# Patient Record
Sex: FEMALE | Race: WHITE | NOT HISPANIC OR LATINO | Employment: FULL TIME | URBAN - METROPOLITAN AREA
[De-identification: names, ages, dates, MRNs, and addresses within clinical notes are randomized per-mention and may not be internally consistent; named-entity substitution may affect disease eponyms.]

---

## 2023-10-09 PROBLEM — S09.92XA NASAL TRAUMA, INITIAL ENCOUNTER: Status: ACTIVE | Noted: 2023-10-09

## 2023-10-09 PROBLEM — S79.919A HIP INJURY: Status: ACTIVE | Noted: 2023-10-09

## 2023-10-09 PROBLEM — S99.929A INJURY OF FOOT: Status: ACTIVE | Noted: 2023-10-09

## 2023-10-09 PROBLEM — J30.9 ALLERGIC RHINITIS: Status: ACTIVE | Noted: 2023-10-09

## 2023-10-09 PROBLEM — F41.8 DEPRESSION WITH ANXIETY: Status: ACTIVE | Noted: 2023-10-09

## 2023-10-09 PROBLEM — S61.219A LACERATION OF FINGER: Status: ACTIVE | Noted: 2023-10-09

## 2023-10-09 PROBLEM — S83.90XA SPRAIN OF KNEE: Status: ACTIVE | Noted: 2023-10-09

## 2023-10-09 PROBLEM — E78.5 HYPERLIPIDEMIA: Status: ACTIVE | Noted: 2023-10-09
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
## 2023-10-09 PROBLEM — M48.061 SPINAL STENOSIS OF LUMBAR REGION AT MULTIPLE LEVELS: Status: ACTIVE | Noted: 2023-10-09

## 2023-10-09 PROBLEM — R05.9 COUGH: Status: ACTIVE | Noted: 2023-10-09

## 2023-10-09 PROBLEM — M79.676 PAIN IN TOE: Status: ACTIVE | Noted: 2023-10-09

## 2023-10-09 PROBLEM — M25.519 SHOULDER PAIN: Status: ACTIVE | Noted: 2023-10-09

## 2023-10-09 PROBLEM — M54.50 CHRONIC LOW BACK PAIN: Status: ACTIVE | Noted: 2023-10-09

## 2023-10-09 PROBLEM — G43.909 MIGRAINE: Status: ACTIVE | Noted: 2023-10-09

## 2023-10-09 PROBLEM — N95.2 ATROPHIC VAGINITIS: Status: ACTIVE | Noted: 2023-10-09

## 2023-10-09 PROBLEM — I10 HTN (HYPERTENSION): Status: ACTIVE | Noted: 2023-10-09

## 2023-10-09 PROBLEM — I96 SKIN NECROSIS (MULTI): Status: ACTIVE | Noted: 2023-10-09

## 2023-10-09 PROBLEM — M79.603 PAIN IN UPPER LIMB: Status: ACTIVE | Noted: 2023-10-09

## 2023-10-09 PROBLEM — J20.9 ACUTE BRONCHITIS: Status: ACTIVE | Noted: 2023-10-09

## 2023-10-09 PROBLEM — E28.39 PREMATURE OVARIAN FAILURE: Status: ACTIVE | Noted: 2023-10-09

## 2023-10-09 PROBLEM — Z76.5 DRUG-SEEKING BEHAVIOR: Status: ACTIVE | Noted: 2023-10-09

## 2023-10-09 PROBLEM — M51.36 DEGENERATION OF LUMBAR INTERVERTEBRAL DISC: Status: ACTIVE | Noted: 2023-10-09

## 2023-10-09 PROBLEM — R91.1 LUNG NODULE: Status: ACTIVE | Noted: 2023-10-09

## 2023-10-09 PROBLEM — J02.9 SORE THROAT: Status: ACTIVE | Noted: 2023-10-09

## 2023-10-09 PROBLEM — G89.29 CHRONIC LOW BACK PAIN: Status: ACTIVE | Noted: 2023-10-09

## 2023-10-09 PROBLEM — G47.00 INSOMNIA: Status: ACTIVE | Noted: 2023-10-09

## 2023-10-09 PROBLEM — F17.200 TOBACCO USE DISORDER: Status: ACTIVE | Noted: 2023-10-09

## 2023-10-09 PROBLEM — Z78.9 MEDICAL HOME PATIENT: Status: ACTIVE | Noted: 2023-10-09

## 2023-10-09 PROBLEM — K08.89 TOOTHACHE: Status: ACTIVE | Noted: 2023-10-09

## 2023-10-09 PROBLEM — R13.10 DYSPHAGIA: Status: ACTIVE | Noted: 2023-10-09

## 2023-10-09 PROBLEM — S39.012A BACK STRAIN: Status: ACTIVE | Noted: 2023-10-09

## 2023-10-09 PROBLEM — M79.606 PAIN OF LOWER EXTREMITY: Status: ACTIVE | Noted: 2023-10-09

## 2023-10-09 PROBLEM — R87.612 LGSIL OF CERVIX OF UNDETERMINED SIGNIFICANCE: Status: ACTIVE | Noted: 2023-10-09

## 2023-10-09 PROBLEM — M67.919 DISORDER OF ROTATOR CUFF: Status: ACTIVE | Noted: 2023-10-09

## 2023-10-09 PROBLEM — E55.9 VITAMIN D DEFICIENCY: Status: ACTIVE | Noted: 2023-10-09

## 2023-10-09 PROBLEM — M51.369 DEGENERATION OF LUMBAR INTERVERTEBRAL DISC: Status: ACTIVE | Noted: 2023-10-09

## 2023-10-09 RX ORDER — GABAPENTIN 600 MG/1
600 TABLET ORAL 3 TIMES DAILY
COMMUNITY
Start: 2020-12-02 | End: 2023-11-14 | Stop reason: SDUPTHER

## 2023-10-09 RX ORDER — ALBUTEROL SULFATE 90 UG/1
2 AEROSOL, METERED RESPIRATORY (INHALATION) EVERY 4 HOURS PRN
COMMUNITY
Start: 2021-06-09 | End: 2023-10-27 | Stop reason: ALTCHOICE

## 2023-10-09 RX ORDER — ONDANSETRON 4 MG/1
4 TABLET, ORALLY DISINTEGRATING ORAL DAILY
COMMUNITY
Start: 2023-06-19 | End: 2023-10-27 | Stop reason: ALTCHOICE

## 2023-10-09 RX ORDER — IBUPROFEN 800 MG/1
800 TABLET ORAL EVERY 8 HOURS PRN
COMMUNITY
End: 2023-10-27 | Stop reason: ALTCHOICE

## 2023-10-09 RX ORDER — TIZANIDINE 4 MG/1
4 TABLET ORAL 3 TIMES DAILY PRN
COMMUNITY
Start: 2023-10-07 | End: 2023-10-27 | Stop reason: ALTCHOICE

## 2023-10-09 RX ORDER — TRAZODONE HYDROCHLORIDE 100 MG/1
200 TABLET ORAL NIGHTLY
COMMUNITY
End: 2023-10-27 | Stop reason: SDUPTHER

## 2023-10-09 RX ORDER — LIDOCAINE 560 MG/1
1 PATCH PERCUTANEOUS; TOPICAL; TRANSDERMAL 2 TIMES DAILY
COMMUNITY
End: 2023-10-27 | Stop reason: ALTCHOICE

## 2023-10-09 RX ORDER — NAPROXEN 500 MG/1
500 TABLET ORAL
COMMUNITY
End: 2023-10-27 | Stop reason: ALTCHOICE

## 2023-10-09 RX ORDER — CHOLECALCIFEROL (VITAMIN D3) 50 MCG
1 TABLET ORAL DAILY
COMMUNITY
End: 2023-12-01 | Stop reason: SDUPTHER

## 2023-10-09 RX ORDER — TRAZODONE HYDROCHLORIDE 100 MG/1
200 TABLET ORAL NIGHTLY
Qty: 60 TABLET | Refills: 3 | OUTPATIENT
Start: 2023-10-09 | End: 2023-11-08

## 2023-10-09 RX ORDER — DULOXETIN HYDROCHLORIDE 30 MG/1
60 CAPSULE, DELAYED RELEASE ORAL DAILY
COMMUNITY
Start: 2021-05-17 | End: 2023-10-27 | Stop reason: ALTCHOICE

## 2023-10-09 RX ORDER — FLUTICASONE PROPIONATE AND SALMETEROL 250; 50 UG/1; UG/1
1 POWDER RESPIRATORY (INHALATION)
COMMUNITY
Start: 2021-11-05 | End: 2023-10-27 | Stop reason: ALTCHOICE

## 2023-10-09 RX ORDER — OXYCODONE HYDROCHLORIDE AND ACETAMINOPHEN 10; 325 MG/1; MG/1
1 TABLET ORAL EVERY 6 HOURS PRN
COMMUNITY
End: 2023-10-27 | Stop reason: ALTCHOICE

## 2023-10-09 RX ORDER — CHLORZOXAZONE 500 MG/1
500 TABLET ORAL 4 TIMES DAILY PRN
COMMUNITY
End: 2023-10-27 | Stop reason: SDUPTHER

## 2023-10-09 RX ORDER — TRAMADOL HYDROCHLORIDE 50 MG/1
100 TABLET ORAL EVERY 6 HOURS PRN
COMMUNITY
End: 2023-10-27 | Stop reason: SDUPTHER

## 2023-10-09 RX ORDER — SUMATRIPTAN SUCCINATE 100 MG/1
100 TABLET ORAL
COMMUNITY
End: 2024-01-11 | Stop reason: SDUPTHER

## 2023-10-09 RX ORDER — METHOCARBAMOL 750 MG/1
750 TABLET, FILM COATED ORAL 3 TIMES DAILY
COMMUNITY
End: 2023-10-27 | Stop reason: ALTCHOICE

## 2023-10-09 RX ORDER — PANTOPRAZOLE SODIUM 40 MG/1
40 TABLET, DELAYED RELEASE ORAL DAILY
COMMUNITY

## 2023-10-13 ENCOUNTER — TELEPHONE (OUTPATIENT)
Dept: PRIMARY CARE | Facility: CLINIC | Age: 48
End: 2023-10-13
Payer: COMMERCIAL

## 2023-10-13 DIAGNOSIS — M51.36 DEGENERATION OF LUMBAR INTERVERTEBRAL DISC: Primary | ICD-10-CM

## 2023-10-26 PROBLEM — M96.1 POST LAMINECTOMY SYNDROME: Status: ACTIVE | Noted: 2023-10-26

## 2023-10-27 ENCOUNTER — OFFICE VISIT (OUTPATIENT)
Dept: PRIMARY CARE | Facility: CLINIC | Age: 48
End: 2023-10-27
Payer: COMMERCIAL

## 2023-10-27 VITALS
SYSTOLIC BLOOD PRESSURE: 110 MMHG | OXYGEN SATURATION: 95 % | BODY MASS INDEX: 22.18 KG/M2 | WEIGHT: 117.4 LBS | HEART RATE: 80 BPM | DIASTOLIC BLOOD PRESSURE: 68 MMHG

## 2023-10-27 DIAGNOSIS — R55 VASOVAGAL SYNCOPE: ICD-10-CM

## 2023-10-27 DIAGNOSIS — F51.01 PRIMARY INSOMNIA: ICD-10-CM

## 2023-10-27 DIAGNOSIS — K59.03 DRUG-INDUCED CONSTIPATION: Primary | ICD-10-CM

## 2023-10-27 DIAGNOSIS — K64.9 ACUTE HEMORRHOID: ICD-10-CM

## 2023-10-27 DIAGNOSIS — M51.36 DEGENERATION OF LUMBAR INTERVERTEBRAL DISC: ICD-10-CM

## 2023-10-27 PROCEDURE — 3078F DIAST BP <80 MM HG: CPT | Performed by: INTERNAL MEDICINE

## 2023-10-27 PROCEDURE — 4004F PT TOBACCO SCREEN RCVD TLK: CPT | Performed by: INTERNAL MEDICINE

## 2023-10-27 PROCEDURE — 99214 OFFICE O/P EST MOD 30 MIN: CPT | Performed by: INTERNAL MEDICINE

## 2023-10-27 PROCEDURE — 3074F SYST BP LT 130 MM HG: CPT | Performed by: INTERNAL MEDICINE

## 2023-10-27 PROCEDURE — 90686 IIV4 VACC NO PRSV 0.5 ML IM: CPT | Performed by: INTERNAL MEDICINE

## 2023-10-27 RX ORDER — CHLORZOXAZONE 500 MG/1
500 TABLET ORAL 4 TIMES DAILY PRN
Qty: 120 TABLET | Refills: 11 | Status: SHIPPED | OUTPATIENT
Start: 2023-10-27

## 2023-10-27 RX ORDER — TRAZODONE HYDROCHLORIDE 100 MG/1
200 TABLET ORAL NIGHTLY
Qty: 60 TABLET | Refills: 11 | Status: SHIPPED | OUTPATIENT
Start: 2023-10-27

## 2023-10-27 RX ORDER — TRAMADOL HYDROCHLORIDE 50 MG/1
100 TABLET ORAL EVERY 6 HOURS PRN
Qty: 120 TABLET | Refills: 0 | Status: SHIPPED | OUTPATIENT
Start: 2023-10-27 | End: 2023-11-14 | Stop reason: SDUPTHER

## 2023-10-27 ASSESSMENT — ENCOUNTER SYMPTOMS
OCCASIONAL FEELINGS OF UNSTEADINESS: 0
LOSS OF SENSATION IN FEET: 0
DEPRESSION: 0

## 2023-10-27 ASSESSMENT — PATIENT HEALTH QUESTIONNAIRE - PHQ9
2. FEELING DOWN, DEPRESSED OR HOPELESS: NOT AT ALL
SUM OF ALL RESPONSES TO PHQ9 QUESTIONS 1 AND 2: 0
1. LITTLE INTEREST OR PLEASURE IN DOING THINGS: NOT AT ALL

## 2023-10-27 ASSESSMENT — PAIN SCALES - GENERAL: PAINLEVEL: 4

## 2023-10-27 NOTE — PROGRESS NOTES
Subjective   Patient ID: Suly Vance is a 48 y.o. female who presents for No chief complaint on file..    In ER for syncope. Found rectal impaction from the CT. ECG and labs wnl.  Used hemorroid wipes w/ lidocaine than used enemas and has been going since then. Couldn't do disimpaction in ER do to hemorrhoid pain.      Intermittent nausea--couldn't take zofran caused severe migraine like headaces           Objective   There were no vitals taken for this visit.    Physical Exam  Constitutional:       General: She is not in acute distress.  Abdominal:      General: Abdomen is flat. Bowel sounds are normal.      Palpations: Abdomen is soft. There is no mass.      Tenderness: There is no abdominal tenderness.         Assessment/Plan   will 2x/day miralax and adjust to soft BM       Suly was seen today for follow-up.  Diagnoses and all orders for this visit:  Drug-induced constipation (Primary)  -     Referral to Gastroenterology; Future  Acute hemorrhoid  -     Referral to Gastroenterology; Future  Vasovagal syncope  Degeneration of lumbar intervertebral disc  -     chlorzoxazone (Parafon Forte) 500 mg tablet; Take 1 tablet (500 mg) by mouth 4 times a day as needed for muscle spasms.  -     traMADol (Ultram) 50 mg tablet; Take 2 tablets (100 mg) by mouth every 6 hours if needed for severe pain (7 - 10).  Primary insomnia  -     traZODone (Desyrel) 100 mg tablet; Take 2 tablets (200 mg) by mouth once daily at bedtime.  Other orders  -     Flu vaccine (IIV4) age 6 months and greater, preservative free  -     Follow Up In Primary Care - Established; Future

## 2023-11-01 ENCOUNTER — APPOINTMENT (OUTPATIENT)
Dept: PRIMARY CARE | Facility: CLINIC | Age: 48
End: 2023-11-01
Payer: COMMERCIAL

## 2023-11-14 DIAGNOSIS — M51.36 DEGENERATION OF LUMBAR INTERVERTEBRAL DISC: ICD-10-CM

## 2023-11-14 RX ORDER — GABAPENTIN 600 MG/1
600 TABLET ORAL 3 TIMES DAILY
Qty: 270 TABLET | Refills: 3 | Status: SHIPPED | OUTPATIENT
Start: 2023-11-14

## 2023-11-14 RX ORDER — TRAMADOL HYDROCHLORIDE 50 MG/1
100 TABLET ORAL EVERY 6 HOURS PRN
Qty: 240 TABLET | Refills: 2 | Status: SHIPPED | OUTPATIENT
Start: 2023-11-14 | End: 2023-12-14

## 2023-12-01 DIAGNOSIS — E55.9 VITAMIN D DEFICIENCY: ICD-10-CM

## 2023-12-01 RX ORDER — OMEGA-3S/DHA/EPA/FISH OIL/D3 300MG-1000
2000 CAPSULE ORAL DAILY
Qty: 90 TABLET | Refills: 3 | Status: SHIPPED | OUTPATIENT
Start: 2023-12-01 | End: 2024-06-03 | Stop reason: SDUPTHER

## 2024-01-11 DIAGNOSIS — G43.C0 PERIODIC HEADACHE SYNDROME, NOT INTRACTABLE: ICD-10-CM

## 2024-01-11 RX ORDER — SUMATRIPTAN SUCCINATE 100 MG/1
100 TABLET ORAL ONCE AS NEEDED
Qty: 10 TABLET | Refills: 3 | Status: SHIPPED | OUTPATIENT
Start: 2024-01-11

## 2024-01-26 ENCOUNTER — OFFICE VISIT (OUTPATIENT)
Dept: PRIMARY CARE | Facility: CLINIC | Age: 49
End: 2024-01-26
Payer: COMMERCIAL

## 2024-01-26 VITALS
OXYGEN SATURATION: 98 % | BODY MASS INDEX: 22.03 KG/M2 | HEART RATE: 84 BPM | SYSTOLIC BLOOD PRESSURE: 104 MMHG | WEIGHT: 116.6 LBS | DIASTOLIC BLOOD PRESSURE: 64 MMHG

## 2024-01-26 DIAGNOSIS — M54.41 CHRONIC BILATERAL LOW BACK PAIN WITH BILATERAL SCIATICA: ICD-10-CM

## 2024-01-26 DIAGNOSIS — F17.200 TOBACCO USE DISORDER: ICD-10-CM

## 2024-01-26 DIAGNOSIS — E55.9 VITAMIN D DEFICIENCY: ICD-10-CM

## 2024-01-26 DIAGNOSIS — M48.061 SPINAL STENOSIS OF LUMBAR REGION AT MULTIPLE LEVELS: ICD-10-CM

## 2024-01-26 DIAGNOSIS — M96.1 POST LAMINECTOMY SYNDROME: ICD-10-CM

## 2024-01-26 DIAGNOSIS — G43.009 MIGRAINE WITHOUT AURA AND WITHOUT STATUS MIGRAINOSUS, NOT INTRACTABLE: ICD-10-CM

## 2024-01-26 DIAGNOSIS — G89.29 CHRONIC BILATERAL LOW BACK PAIN WITH BILATERAL SCIATICA: ICD-10-CM

## 2024-01-26 DIAGNOSIS — I10 PRIMARY HYPERTENSION: Primary | ICD-10-CM

## 2024-01-26 DIAGNOSIS — E78.2 MIXED HYPERLIPIDEMIA: ICD-10-CM

## 2024-01-26 DIAGNOSIS — M54.42 CHRONIC BILATERAL LOW BACK PAIN WITH BILATERAL SCIATICA: ICD-10-CM

## 2024-01-26 DIAGNOSIS — J30.1 SEASONAL ALLERGIC RHINITIS DUE TO POLLEN: ICD-10-CM

## 2024-01-26 DIAGNOSIS — R91.1 LUNG NODULE: ICD-10-CM

## 2024-01-26 DIAGNOSIS — F41.8 DEPRESSION WITH ANXIETY: ICD-10-CM

## 2024-01-26 DIAGNOSIS — F51.01 PRIMARY INSOMNIA: ICD-10-CM

## 2024-01-26 PROBLEM — J20.9 ACUTE BRONCHITIS: Status: RESOLVED | Noted: 2023-10-09 | Resolved: 2024-01-26

## 2024-01-26 PROBLEM — J02.9 SORE THROAT: Status: RESOLVED | Noted: 2023-10-09 | Resolved: 2024-01-26
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
## 2024-01-26 PROBLEM — S39.012A BACK STRAIN: Status: RESOLVED | Noted: 2023-10-09 | Resolved: 2024-01-26

## 2024-01-26 PROBLEM — S09.92XA NASAL TRAUMA, INITIAL ENCOUNTER: Status: RESOLVED | Noted: 2023-10-09 | Resolved: 2024-01-26

## 2024-01-26 PROBLEM — S83.90XA SPRAIN OF KNEE: Status: RESOLVED | Noted: 2023-10-09 | Resolved: 2024-01-26

## 2024-01-26 PROBLEM — R05.9 COUGH: Status: RESOLVED | Noted: 2023-10-09 | Resolved: 2024-01-26

## 2024-01-26 PROBLEM — K08.89 TOOTHACHE: Status: RESOLVED | Noted: 2023-10-09 | Resolved: 2024-01-26

## 2024-01-26 PROBLEM — M79.606 PAIN OF LOWER EXTREMITY: Status: RESOLVED | Noted: 2023-10-09 | Resolved: 2024-01-26

## 2024-01-26 PROBLEM — M79.603 PAIN IN UPPER LIMB: Status: RESOLVED | Noted: 2023-10-09 | Resolved: 2024-01-26

## 2024-01-26 PROBLEM — S61.219A LACERATION OF FINGER: Status: RESOLVED | Noted: 2023-10-09 | Resolved: 2024-01-26

## 2024-01-26 PROBLEM — S99.929A INJURY OF FOOT: Status: RESOLVED | Noted: 2023-10-09 | Resolved: 2024-01-26

## 2024-01-26 PROBLEM — M79.676 PAIN IN TOE: Status: RESOLVED | Noted: 2023-10-09 | Resolved: 2024-01-26

## 2024-01-26 PROBLEM — M25.519 SHOULDER PAIN: Status: RESOLVED | Noted: 2023-10-09 | Resolved: 2024-01-26

## 2024-01-26 PROBLEM — S79.919A HIP INJURY: Status: RESOLVED | Noted: 2023-10-09 | Resolved: 2024-01-26

## 2024-01-26 PROCEDURE — 3074F SYST BP LT 130 MM HG: CPT | Performed by: INTERNAL MEDICINE

## 2024-01-26 PROCEDURE — 3078F DIAST BP <80 MM HG: CPT | Performed by: INTERNAL MEDICINE

## 2024-01-26 PROCEDURE — 99214 OFFICE O/P EST MOD 30 MIN: CPT | Performed by: INTERNAL MEDICINE

## 2024-01-26 PROCEDURE — 4004F PT TOBACCO SCREEN RCVD TLK: CPT | Performed by: INTERNAL MEDICINE

## 2024-01-26 RX ORDER — TRAMADOL HYDROCHLORIDE 50 MG/1
100 TABLET ORAL EVERY 6 HOURS PRN
Qty: 150 TABLET | Refills: 0 | Status: SHIPPED | OUTPATIENT
Start: 2024-01-26 | End: 2024-02-12 | Stop reason: ENTERED-IN-ERROR

## 2024-01-26 RX ORDER — TRAMADOL HYDROCHLORIDE 50 MG/1
100 TABLET ORAL EVERY 6 HOURS PRN
COMMUNITY
Start: 2024-01-13 | End: 2024-01-26 | Stop reason: SDUPTHER

## 2024-01-26 ASSESSMENT — ENCOUNTER SYMPTOMS
NECK PAIN: 1
DIARRHEA: 0
COUGH: 0
OCCASIONAL FEELINGS OF UNSTEADINESS: 0
CONSTIPATION: 0
ARTHRALGIAS: 1
BACK PAIN: 1
CONSTITUTIONAL NEGATIVE: 1
DYSURIA: 0
CARDIOVASCULAR NEGATIVE: 1
DEPRESSION: 0
MYALGIAS: 1
PSYCHIATRIC NEGATIVE: 1
ACTIVITY CHANGE: 0
SHORTNESS OF BREATH: 0
LOSS OF SENSATION IN FEET: 0
ABDOMINAL PAIN: 0

## 2024-01-26 ASSESSMENT — PATIENT HEALTH QUESTIONNAIRE - PHQ9
SUM OF ALL RESPONSES TO PHQ9 QUESTIONS 1 AND 2: 0
2. FEELING DOWN, DEPRESSED OR HOPELESS: NOT AT ALL
1. LITTLE INTEREST OR PLEASURE IN DOING THINGS: NOT AT ALL

## 2024-01-26 ASSESSMENT — PAIN SCALES - GENERAL: PAINLEVEL: 3

## 2024-01-26 NOTE — PROGRESS NOTES
Subjective   Patient ID: Suly Vance is a 48 y.o. female who presents for 3 month follow up .    Hypertension-off meds and good BP Readings from Last 3 Encounters:  01/26/24 : 104/64  10/27/23 : 110/68  06/19/23 : 130/80       Hyperlipidemia-stable and compliant on meds. Taking.  Lab Results       Component                Value               Date                       LDLCALC                  111                 11/22/2022               Depression with anxiety-currently on no meds and thinks she's doing ok    Chronic pain due to lumbar radiculopathy and post-laminectomy syndrome-no longer seeing pain management. Uses tramadol and medical marijuana    Migraines-bad when ill last month-usually 1-2/week-imitrex helps    Tobacco use--continues to smoke but down to 5/day    Insomnia-using trazadone helps but wakes from pain    Vitamin D def on supplements      Current Outpatient Medications:   ·  chlorzoxazone (Parafon Forte) 500 mg tablet, Take 1 tablet (500 mg) by mouth 4 times a day as needed for muscle spasms., Disp: 120 tablet, Rfl: 11  ·  gabapentin (Neurontin) 600 mg tablet, Take 1 tablet (600 mg) by mouth 3 times a day., Disp: 270 tablet, Rfl: 3  ·  medical cannabis, Medical Marijuana, Disp: , Rfl:   ·  pantoprazole (ProtoNix) 40 mg EC tablet, Take 1 tablet (40 mg) by mouth once daily., Disp: , Rfl:   ·  SUMAtriptan (Imitrex) 100 mg tablet, Take 1 tablet (100 mg) by mouth 1 time if needed for migraine. AT LEAST 2 HOURS BETWEEN DOSES AS NEEDED FOR HEADACHE, Disp: 10 tablet, Rfl: 3  ·  traZODone (Desyrel) 100 mg tablet, Take 2 tablets (200 mg) by mouth once daily at bedtime., Disp: 60 tablet, Rfl: 11  ·  Vitamin D3 50 mcg (2,000 unit) tablet, TAKE 1 TABLET BY MOUTH EVERY DAY, Disp: 90 tablet, Rfl: 3  ·  traMADol (Ultram) 50 mg tablet, Take 2 tablets (100 mg) by mouth every 6 hours if needed for moderate pain (4 - 6) or severe pain (7 - 10)., Disp: 150 tablet, Rfl: 0              Review of Systems    Constitutional: Negative.  Negative for activity change.   HENT:          Dentures broke =new ones due next week so only able to eat soft foods   Respiratory:  Negative for cough and shortness of breath.    Cardiovascular: Negative.    Gastrointestinal:  Negative for abdominal pain, constipation and diarrhea.   Genitourinary:  Negative for dysuria.   Musculoskeletal:  Positive for arthralgias, back pain, gait problem, myalgias and neck pain.   Skin:  Negative for rash.   Psychiatric/Behavioral: Negative.         Objective   /64 (BP Location: Left arm, Patient Position: Sitting)   Pulse 84   Wt 52.9 kg (116 lb 9.6 oz)   SpO2 98%   BMI 22.03 kg/m²     Physical Exam  Constitutional:       General: She is not in acute distress.     Appearance: Normal appearance.   Cardiovascular:      Rate and Rhythm: Normal rate and regular rhythm.      Heart sounds: Normal heart sounds. No murmur heard.     No gallop.   Pulmonary:      Breath sounds: Normal breath sounds. No wheezing, rhonchi or rales.   Musculoskeletal:      Comments: Moves fairly good today   Skin:     General: Skin is warm and dry.      Findings: No rash.   Neurological:      Mental Status: She is alert and oriented to person, place, and time.   Psychiatric:         Mood and Affect: Mood normal.         Assessment/Plan   Diagnoses and all orders for this visit:  Primary hypertension  Mixed hyperlipidemia  Seasonal allergic rhinitis due to pollen  Vitamin D deficiency  Depression with anxiety  Chronic bilateral low back pain with bilateral sciatica  Post laminectomy syndrome  -     traMADol (Ultram) 50 mg tablet; Take 2 tablets (100 mg) by mouth every 6 hours if needed for moderate pain (4 - 6) or severe pain (7 - 10).  Spinal stenosis of lumbar region at multiple levels  Migraine without aura and without status migrainosus, not intractable  Lung nodule  Primary insomnia  Tobacco use disorder  Other orders  -     Follow Up In Primary Care - Established;  Future    Will refer to pain management-limited tramadol script til then. See signed opiod agreement

## 2024-02-09 ENCOUNTER — TELEPHONE (OUTPATIENT)
Dept: PRIMARY CARE | Facility: CLINIC | Age: 49
End: 2024-02-09
Payer: COMMERCIAL

## 2024-02-09 DIAGNOSIS — M96.1 POST LAMINECTOMY SYNDROME: ICD-10-CM

## 2024-02-09 NOTE — TELEPHONE ENCOUNTER
Pt asking if you have decreased her Tramadol 50 mg (she takes two tabs 4x daily) at last appt.  She usually gets quantity of 240 and the pharmacy has quantity of 150.  Please advise @[phone] and ok to lmom.

## 2024-02-09 NOTE — TELEPHONE ENCOUNTER
It was a smaller prescription because you were going to see pain management-we can't both prescribe

## 2024-02-12 RX ORDER — TRAMADOL HYDROCHLORIDE 50 MG/1
100 TABLET ORAL EVERY 6 HOURS PRN
Qty: 30 TABLET | Refills: 0 | Status: SHIPPED | OUTPATIENT
Start: 2024-02-12 | End: 2024-02-17

## 2024-02-12 NOTE — TELEPHONE ENCOUNTER
Patient stated recent script for Tramadol , patient had picked up script 2 weeks ago. Patient seeing pain mgt this Friday. [facility] ( to cover patient through Friday ) [facility]

## 2024-02-14 NOTE — PROGRESS NOTES
"[facility]  New Patient Office Visit Note 2024    Patient Information: Suly Vance MRN: [id number], : 1975   Primary Care/Referring Physician: Michaela Arthur MD, [address] [facility], [place] [address] [place]*     Chief Complaint: Low back and left leg pain  History of Pain: Ms. Suly Vance is a 48 y.o. female with a PMHx of HTN, HLD, depression, anxiety, migraines, ?seizure disorder who presents for low back and left leg pain.    She reports history of a \"low back fracture\" in  after moving heavy furniture, which required decompression and posterior instrumented fusion around  or . She reports gradually progressive pain since her surgery. She describes midline pain near her lumbosacral junction which radiates down her left lateral leg to the level of the ankle. She gets left anterior thigh and knee numbness, and left lateral calf numbness. Denies any major issues in her right leg. Her pain worsens with prolonged sitting, which particularly bothers her tailbone, and prolonged standing. Her pain improves when she is walking.     Current Pain Medications: Tramadol 100 mg q6h PRN, Medical THC, Gabapentin 600 mg TID, Chlorzoxazone 500 mg QID  Previously Tried Pain Medications: Tizanidine, Morphine, Percocet, Pregabalin, Duloxetine - caused mood changes    Relevant Surgeries: Hx of L4/5 decompression/fusion w/ artificial disc  Injections: Underwent steroid injections in the past with only very short term pain relief  Physical/Occupational Therapy: Has done PT in the past with no improvement.     Medications:   Current Outpatient Medications   Medication Instructions    chlorzoxazone (PARAFON FORTE) 500 mg, oral, 4 times daily PRN    gabapentin (NEURONTIN) 600 mg, oral, 3 times daily    medical cannabis Medical Marijuana    pantoprazole (PROTONIX) 40 mg, oral, Daily    SUMAtriptan (IMITREX) 100 mg, oral, Once as needed, AT LEAST 2 HOURS BETWEEN DOSES AS NEEDED FOR " HEADACHE    traMADol (ULTRAM) 100 mg, oral, Every 6 hours PRN    traZODone (DESYREL) 200 mg, oral, Nightly    Vitamin D3 2,000 Units, oral, Daily      Allergies:   Allergies   Allergen Reactions    Pregabalin Other    Pseudoephedrine Runny nose    Topiramate Other    Varenicline Other    Zofran [Ondansetron Hcl] Headache    Nicotine Rash       Past Medical & Surgical History:  History reviewed. No pertinent past medical history.   Past Surgical History:   Procedure Laterality Date    OTHER SURGICAL HISTORY  2022     section    OTHER SURGICAL HISTORY  2022    Scar revision    OTHER SURGICAL HISTORY  2022    Back surgery    OTHER SURGICAL HISTORY  2022    Tonsillectomy       Family History   Problem Relation Name Age of Onset    Cancer Mother      Stroke Mother      Hypertension Mother      Cervical cancer Mother      Mental illness Mother      Hypertension Father      Cancer Sister      Cervical cancer Sister      Heart disease Maternal Grandmother      Cancer Paternal Grandmother      Breast cancer Paternal Grandmother          metastatic    Parkinsonism Paternal Grandfather       Social History     Socioeconomic History    Marital status: Unknown     Spouse name: Not on file    Number of children: Not on file    Years of education: Not on file    Highest education level: Not on file   Occupational History    Not on file   Tobacco Use    Smoking status: Every Day     Packs/day: 0.25     Years: 10.00     Additional pack years: 0.00     Total pack years: 2.50     Types: Cigarettes     Passive exposure: Current    Smokeless tobacco: Never   Vaping Use    Vaping Use: Some days    Substances: THC    Devices: Disposable, Pre-filled or refillable cartridge    Passive vaping exposure: Yes   Substance and Sexual Activity    Alcohol use: Not Currently    Drug use: Yes     Types: Marijuana     Comment: Medical marijuana card al    Sexual activity: Yes   Other Topics Concern    Not on file    Social History Narrative    Not on file     Social Determinants of Health     Financial Resource Strain: Not on file   Food Insecurity: Not on file   Transportation Needs: Not on file   Physical Activity: Not on file   Stress: Not on file   Social Connections: Not on file   Intimate Partner Violence: Not on file   Housing Stability: Not on file       Problems, Past medical history, past surgical history, Medications, allergies, social and family history reviewed and as per the electronic medical record from today's encounter    Review of Systems:  CONST: No fever, chills, fatigue, weight changes  EYES: No loss of vision  ENT: No hearing loss, tinnitus  CV: No chest pain, palpitations  RESP: No dyspnea, shortness of breath, cough  GI: No stool incontinence, nausea, vomiting  : No urinary incontinence  MSK: No joint swelling  SKIN: No rash, no hives  NEURO: No headache, dizziness  PSYCH: No anxiety, depression  HEM/LYMPH: No easy bruising or bleeding  All other systems reviewed are negative     Physical Exam:  Vitals: /85   Pulse 80   Resp 16   Ht 1.524 m (5')   Wt 52.6 kg (116 lb)   BMI 22.65 kg/m²   General: No apparent distress. Alert, appropriate, oriented x 3. Mood and affect normal. Speaking in full sentences.  HENT: Normocephalic, atraumatic. Hearing intact.  Eyes: Extraocular movements grossly intact. Pupils equal and round.   Neck: Supple, trachea midline.  Lungs: Symmetric respiratory excursion on visual exam, nonlabored breathing.  Extremities: No clubbing, cyanosis, or edema noted in arms or legs.  Skin: No rashes, lesions, alopecia noted on back or extremities. Well healed vertical incisions noted on low back.   Back: Reports midline tenderness over her surgical site, tenderness to palpation of bilateral lumbar paraspinal muscles. Reports pain to palpation overlying bilateral SI joints, bilateral GTB. No spasm noted over musculature. No misalignment or asymmetry noted.  Neuro: Alert and  "appropriate. Motor strength 5/5 throughout bilateral lower extremities. Gait within normal limits. Bulk and tone within normal limits.    Laboratory Data:  The following laboratory data were reviewed during this visit:   Lab Results   Component Value Date    WBC 7.2 06/19/2023    RBC 4.42 06/19/2023    HGB 13.2 06/19/2023    HCT 39.9 06/19/2023     06/19/2023      No results found for: \"INR\"  Lab Results   Component Value Date    CREATININE 0.7 06/19/2023       Imaging:  The following imaging impressions were reviewed by me during this visit:    -4/17/18 lumbar spine MRI with L1/2 broad-based disc bulging causing mild to moderate central canal narrowing. L3/4 facet arthropathy. L4/5 laminectomy noted with three mm anterior subluxation, mild to moderate bilateral neural foramen narrowing.    I also personally reviewed the images from the above studies myself. These images and my interpretation of them contributed to the management and decision making of the patient's medical plan.    ASSESSMENT:  Ms. Suly Vance is a 48 y.o. female with low back and left leg pain that is consistent with:    1. Postlaminectomy syndrome of lumbar region    2. Vertebrogenic low back pain    3. Myofascial pain    4. Lumbar spondylosis    5. Long-term current use of opiate analgesic        PLAN:    Diagnostics:   - I reviewed her lumbar spine MRI from 2018 (see above for details). I discussed that if we were to pursue additional interventional therapy I would recommend a repeat lumbar spine MRI to evaluate for progression of degenerative changes and for interventional planning purposes    Physical Therapy and Rehabilitation:     - Continue your current HEP    Psychologically:  - No needs at this time    Medications  - I did have a long discussion with her about medication options today. I told her that I would be willing to take over prescription of her Tramadol, but she would no longer be able to use medical THC per  policy. " Additionally, my plan would be to taper down her Tramadol dose and trial other non-opioid medications along with interventional treatment to better control her pain. She states that she will have to think about this    Duration  - Multiple years    Interventions:  - I suspect her pain is multi-factorial secondary to lumbar post-laminectomy syndrome, lumbar spondylosis, and myofascial pain. There are interventional options such as lumbar mbb's/RFA, spinal cord stimulation. We discussed these briefly today and she plans to read more about them.         Sincerely,  Lázaro Cowan MD  [facility] [place]

## 2024-02-16 ENCOUNTER — OFFICE VISIT (OUTPATIENT)
Dept: PAIN MEDICINE | Facility: CLINIC | Age: 49
End: 2024-02-16
Payer: COMMERCIAL

## 2024-02-16 VITALS
DIASTOLIC BLOOD PRESSURE: 85 MMHG | HEIGHT: 60 IN | RESPIRATION RATE: 16 BRPM | SYSTOLIC BLOOD PRESSURE: 144 MMHG | WEIGHT: 116 LBS | BODY MASS INDEX: 22.78 KG/M2 | HEART RATE: 80 BPM

## 2024-02-16 DIAGNOSIS — M96.1 POSTLAMINECTOMY SYNDROME OF LUMBAR REGION: Primary | ICD-10-CM

## 2024-02-16 DIAGNOSIS — M47.816 LUMBAR SPONDYLOSIS: ICD-10-CM

## 2024-02-16 DIAGNOSIS — Z79.891 LONG-TERM CURRENT USE OF OPIATE ANALGESIC: ICD-10-CM

## 2024-02-16 DIAGNOSIS — M79.18 MYOFASCIAL PAIN: ICD-10-CM

## 2024-02-16 DIAGNOSIS — M54.51 VERTEBROGENIC LOW BACK PAIN: ICD-10-CM

## 2024-02-16 PROCEDURE — 99204 OFFICE O/P NEW MOD 45 MIN: CPT | Performed by: STUDENT IN AN ORGANIZED HEALTH CARE EDUCATION/TRAINING PROGRAM

## 2024-02-16 PROCEDURE — 3079F DIAST BP 80-89 MM HG: CPT | Performed by: STUDENT IN AN ORGANIZED HEALTH CARE EDUCATION/TRAINING PROGRAM

## 2024-02-16 PROCEDURE — 99214 OFFICE O/P EST MOD 30 MIN: CPT | Performed by: STUDENT IN AN ORGANIZED HEALTH CARE EDUCATION/TRAINING PROGRAM

## 2024-02-16 PROCEDURE — 4004F PT TOBACCO SCREEN RCVD TLK: CPT | Performed by: STUDENT IN AN ORGANIZED HEALTH CARE EDUCATION/TRAINING PROGRAM

## 2024-02-16 PROCEDURE — 3077F SYST BP >= 140 MM HG: CPT | Performed by: STUDENT IN AN ORGANIZED HEALTH CARE EDUCATION/TRAINING PROGRAM

## 2024-02-16 ASSESSMENT — PATIENT HEALTH QUESTIONNAIRE - PHQ9
1. LITTLE INTEREST OR PLEASURE IN DOING THINGS: NOT AT ALL
SUM OF ALL RESPONSES TO PHQ9 QUESTIONS 1 AND 2: 0
2. FEELING DOWN, DEPRESSED OR HOPELESS: NOT AT ALL

## 2024-02-16 ASSESSMENT — PAIN DESCRIPTION - DESCRIPTORS: DESCRIPTORS: OTHER (COMMENT)

## 2024-02-16 ASSESSMENT — LIFESTYLE VARIABLES: TOTAL SCORE: 4

## 2024-02-16 ASSESSMENT — PAIN - FUNCTIONAL ASSESSMENT: PAIN_FUNCTIONAL_ASSESSMENT: 0-10

## 2024-02-16 ASSESSMENT — PAIN SCALES - GENERAL
PAINLEVEL_OUTOF10: 4
PAINLEVEL: 4

## 2024-04-19 ENCOUNTER — OFFICE VISIT (OUTPATIENT)
Dept: PRIMARY CARE | Facility: CLINIC | Age: 49
End: 2024-04-19
Payer: COMMERCIAL

## 2024-04-19 ENCOUNTER — HOSPITAL ENCOUNTER (OUTPATIENT)
Dept: RADIOLOGY | Facility: HOSPITAL | Age: 49
Discharge: HOME | End: 2024-04-19
Payer: COMMERCIAL

## 2024-04-19 VITALS
WEIGHT: 111.8 LBS | SYSTOLIC BLOOD PRESSURE: 116 MMHG | DIASTOLIC BLOOD PRESSURE: 70 MMHG | OXYGEN SATURATION: 99 % | HEART RATE: 80 BPM | BODY MASS INDEX: 21.83 KG/M2

## 2024-04-19 DIAGNOSIS — S69.92XA INJURY OF LEFT WRIST, INITIAL ENCOUNTER: ICD-10-CM

## 2024-04-19 DIAGNOSIS — S69.92XA INJURY OF LEFT WRIST, INITIAL ENCOUNTER: Primary | ICD-10-CM

## 2024-04-19 PROCEDURE — 3078F DIAST BP <80 MM HG: CPT | Performed by: INTERNAL MEDICINE

## 2024-04-19 PROCEDURE — 73110 X-RAY EXAM OF WRIST: CPT | Mod: LT

## 2024-04-19 PROCEDURE — 3074F SYST BP LT 130 MM HG: CPT | Performed by: INTERNAL MEDICINE

## 2024-04-19 PROCEDURE — 99213 OFFICE O/P EST LOW 20 MIN: CPT | Performed by: INTERNAL MEDICINE

## 2024-04-19 PROCEDURE — 73110 X-RAY EXAM OF WRIST: CPT | Mod: LEFT SIDE | Performed by: RADIOLOGY

## 2024-04-19 PROCEDURE — 4004F PT TOBACCO SCREEN RCVD TLK: CPT | Performed by: INTERNAL MEDICINE

## 2024-04-19 ASSESSMENT — ENCOUNTER SYMPTOMS
OCCASIONAL FEELINGS OF UNSTEADINESS: 0
LOSS OF SENSATION IN FEET: 0
DEPRESSION: 0

## 2024-04-19 ASSESSMENT — PATIENT HEALTH QUESTIONNAIRE - PHQ9
1. LITTLE INTEREST OR PLEASURE IN DOING THINGS: NOT AT ALL
2. FEELING DOWN, DEPRESSED OR HOPELESS: NOT AT ALL
SUM OF ALL RESPONSES TO PHQ9 QUESTIONS 1 AND 2: 0

## 2024-04-19 ASSESSMENT — PAIN SCALES - GENERAL: PAINLEVEL: 5

## 2024-04-19 NOTE — PROGRESS NOTES
Subjective   Patient ID: Suly Vance is a 48 y.o. female who presents for left wrist injury.    Walking with -trying to zip jacket-missed crack in sidewalk and fell forward catching herself with both hands. Palms of hands torn up, R wrist ok but left hand really hurts and can't lift with it. Didn't go to .     Saw pain management few months ago-he wouldn't do her meds-because she has medical marijuana license--had withdrawel from the tramadol. Discussed nerve stimulator and nerve ablation--she's not sure she wants to do either.             Objective   /70 (BP Location: Left arm, Patient Position: Sitting)   Pulse 80   Wt 50.7 kg (111 lb 12.8 oz)   SpO2 99%   BMI 21.83 kg/m²     Physical Exam superficial roadrash on bilateral palms; R wrist pain free ROM. L wrist tender with both palpation and any motion. Diminished  strength on left    Assessment/Plan   Diagnoses and all orders for this visit:  Injury of left wrist, initial encounter  -     XR wrist left 3+ views; Future    Advised wrist brace until results available

## 2024-04-26 ENCOUNTER — OFFICE VISIT (OUTPATIENT)
Dept: PRIMARY CARE | Facility: CLINIC | Age: 49
End: 2024-04-26
Payer: COMMERCIAL

## 2024-04-26 VITALS
SYSTOLIC BLOOD PRESSURE: 104 MMHG | DIASTOLIC BLOOD PRESSURE: 60 MMHG | OXYGEN SATURATION: 98 % | HEART RATE: 76 BPM | BODY MASS INDEX: 21.64 KG/M2 | WEIGHT: 110.8 LBS

## 2024-04-26 DIAGNOSIS — I10 PRIMARY HYPERTENSION: ICD-10-CM

## 2024-04-26 DIAGNOSIS — J30.1 SEASONAL ALLERGIC RHINITIS DUE TO POLLEN: ICD-10-CM

## 2024-04-26 DIAGNOSIS — F51.01 PRIMARY INSOMNIA: ICD-10-CM

## 2024-04-26 DIAGNOSIS — E55.9 VITAMIN D DEFICIENCY: ICD-10-CM

## 2024-04-26 DIAGNOSIS — F17.200 TOBACCO USE DISORDER: ICD-10-CM

## 2024-04-26 DIAGNOSIS — G43.009 MIGRAINE WITHOUT AURA AND WITHOUT STATUS MIGRAINOSUS, NOT INTRACTABLE: ICD-10-CM

## 2024-04-26 DIAGNOSIS — M48.061 SPINAL STENOSIS OF LUMBAR REGION AT MULTIPLE LEVELS: ICD-10-CM

## 2024-04-26 DIAGNOSIS — F41.8 DEPRESSION WITH ANXIETY: Primary | ICD-10-CM

## 2024-04-26 DIAGNOSIS — R13.10 DYSPHAGIA, UNSPECIFIED TYPE: ICD-10-CM

## 2024-04-26 DIAGNOSIS — M96.1 POST LAMINECTOMY SYNDROME: ICD-10-CM

## 2024-04-26 DIAGNOSIS — E78.2 MIXED HYPERLIPIDEMIA: ICD-10-CM

## 2024-04-26 PROBLEM — I96 SKIN NECROSIS (MULTI): Status: RESOLVED | Noted: 2023-10-09 | Resolved: 2024-04-26

## 2024-04-26 PROCEDURE — 3074F SYST BP LT 130 MM HG: CPT | Performed by: INTERNAL MEDICINE

## 2024-04-26 PROCEDURE — 4004F PT TOBACCO SCREEN RCVD TLK: CPT | Performed by: INTERNAL MEDICINE

## 2024-04-26 PROCEDURE — 3078F DIAST BP <80 MM HG: CPT | Performed by: INTERNAL MEDICINE

## 2024-04-26 PROCEDURE — 99214 OFFICE O/P EST MOD 30 MIN: CPT | Performed by: INTERNAL MEDICINE

## 2024-04-26 RX ORDER — VENLAFAXINE HYDROCHLORIDE 75 MG/1
75 CAPSULE, EXTENDED RELEASE ORAL DAILY
Qty: 30 CAPSULE | Refills: 11 | Status: SHIPPED | OUTPATIENT
Start: 2024-04-26 | End: 2024-06-25

## 2024-04-26 ASSESSMENT — ENCOUNTER SYMPTOMS
CONSTITUTIONAL NEGATIVE: 1
NECK PAIN: 1
CARDIOVASCULAR NEGATIVE: 1
COUGH: 0
MYALGIAS: 1
ACTIVITY CHANGE: 0
BACK PAIN: 1
SHORTNESS OF BREATH: 0
DYSURIA: 0
CONSTIPATION: 0
ARTHRALGIAS: 1
DIARRHEA: 0
ABDOMINAL PAIN: 0
PSYCHIATRIC NEGATIVE: 1

## 2024-04-26 ASSESSMENT — PAIN SCALES - GENERAL: PAINLEVEL: 5

## 2024-04-26 NOTE — PROGRESS NOTES
Subjective   Patient ID: Suly Vance is a 48 y.o. female who presents for No chief complaint on file..    Hypertension-off meds and good     Hyperlipidemia-on no meds.  Lab Results       Component                Value               Date                       LDLCALC                  111                 11/22/2022               Depression with anxiety-currently on no meds but admits stress is much worse-seeing anger issues and bad anxiety    Chronic pain due to lumbar radiculopathy and post-laminectomy syndrome-no longer seeing pain management. Uses  medical marijuana    Migraines-worse with stress-usually 1-2/week-imitrex helps    Dysphagia/GERD-fairly stable on protonix    Tobacco use--continues to smoke -worse with stress-about 1/2 ppd    Insomnia-using trazadone helps but wakes from pain    Vitamin D def on supplements    Wrist-better-able to move it around and lift light things.  with certain motions.  But able to use it more.             Review of Systems   Constitutional: Negative.  Negative for activity change.   HENT:          Dentures broke =new ones due next week so only able to eat soft foods   Respiratory:  Negative for cough and shortness of breath.    Cardiovascular: Negative.    Gastrointestinal:  Negative for abdominal pain, constipation and diarrhea.   Genitourinary:  Negative for dysuria.   Musculoskeletal:  Positive for arthralgias, back pain, gait problem, myalgias and neck pain.   Skin:  Negative for rash.   Psychiatric/Behavioral: Negative.         Objective   There were no vitals taken for this visit.    Physical Exam  Constitutional:       General: She is not in acute distress.     Appearance: Normal appearance.   Cardiovascular:      Rate and Rhythm: Normal rate and regular rhythm.      Heart sounds: Normal heart sounds. No murmur heard.     No gallop.   Pulmonary:      Breath sounds: Normal breath sounds. No wheezing, rhonchi or rales.   Musculoskeletal:      Comments: L wrist  -able to gently move and palpate today; able to    Skin:     General: Skin is warm and dry.      Findings: No rash.   Neurological:      Mental Status: She is alert and oriented to person, place, and time.   Psychiatric:      Comments: Visibly stressed devin discussing Dad         Assessment/Plan will add effexor-reportedly the one she tolerated the best. Con't rest of regimen  Diagnoses and all orders for this visit:  Depression with anxiety  -     venlafaxine XR (Effexor-XR) 75 mg 24 hr capsule; Take 1 capsule (75 mg) by mouth once daily. Take with food.  Primary hypertension  Mixed hyperlipidemia  Seasonal allergic rhinitis due to pollen  Vitamin D deficiency  Dysphagia, unspecified type  Post laminectomy syndrome  Migraine without aura and without status migrainosus, not intractable  Spinal stenosis of lumbar region at multiple levels  Primary insomnia  Tobacco use disorder      Current Outpatient Medications:     chlorzoxazone (Parafon Forte) 500 mg tablet, Take 1 tablet (500 mg) by mouth 4 times a day as needed for muscle spasms., Disp: 120 tablet, Rfl: 11    gabapentin (Neurontin) 600 mg tablet, Take 1 tablet (600 mg) by mouth 3 times a day., Disp: 270 tablet, Rfl: 3    pantoprazole (ProtoNix) 40 mg EC tablet, Take 1 tablet (40 mg) by mouth once daily., Disp: , Rfl:     SUMAtriptan (Imitrex) 100 mg tablet, Take 1 tablet (100 mg) by mouth 1 time if needed for migraine. AT LEAST 2 HOURS BETWEEN DOSES AS NEEDED FOR HEADACHE, Disp: 10 tablet, Rfl: 3    traZODone (Desyrel) 100 mg tablet, Take 2 tablets (200 mg) by mouth once daily at bedtime., Disp: 60 tablet, Rfl: 11    Vitamin D3 50 mcg (2,000 unit) tablet, TAKE 1 TABLET BY MOUTH EVERY DAY, Disp: 90 tablet, Rfl: 3    venlafaxine XR (Effexor-XR) 75 mg 24 hr capsule, Take 1 capsule (75 mg) by mouth once daily. Take with food., Disp: 30 capsule, Rfl: 11

## 2024-06-03 ENCOUNTER — TELEPHONE (OUTPATIENT)
Dept: PRIMARY CARE | Facility: CLINIC | Age: 49
End: 2024-06-03
Payer: COMMERCIAL

## 2024-06-03 DIAGNOSIS — E55.9 VITAMIN D DEFICIENCY: ICD-10-CM

## 2024-06-03 RX ORDER — CHOLECALCIFEROL (VITAMIN D3) 50 MCG
2000 TABLET ORAL DAILY
Qty: 90 TABLET | Refills: 3 | Status: SHIPPED | OUTPATIENT
Start: 2024-06-03

## 2024-07-26 ENCOUNTER — APPOINTMENT (OUTPATIENT)
Dept: PRIMARY CARE | Facility: CLINIC | Age: 49
End: 2024-07-26
Payer: COMMERCIAL

## 2024-08-07 ENCOUNTER — OFFICE VISIT (OUTPATIENT)
Dept: PRIMARY CARE | Facility: CLINIC | Age: 49
End: 2024-08-07
Payer: COMMERCIAL

## 2024-08-07 VITALS
HEART RATE: 109 BPM | OXYGEN SATURATION: 97 % | WEIGHT: 105 LBS | BODY MASS INDEX: 20.51 KG/M2 | DIASTOLIC BLOOD PRESSURE: 70 MMHG | SYSTOLIC BLOOD PRESSURE: 110 MMHG

## 2024-08-07 DIAGNOSIS — I10 PRIMARY HYPERTENSION: ICD-10-CM

## 2024-08-07 DIAGNOSIS — G43.C0 PERIODIC HEADACHE SYNDROME, NOT INTRACTABLE: ICD-10-CM

## 2024-08-07 DIAGNOSIS — R13.10 DYSPHAGIA, UNSPECIFIED TYPE: ICD-10-CM

## 2024-08-07 DIAGNOSIS — E55.9 VITAMIN D DEFICIENCY: ICD-10-CM

## 2024-08-07 DIAGNOSIS — E78.2 MIXED HYPERLIPIDEMIA: ICD-10-CM

## 2024-08-07 DIAGNOSIS — M54.42 CHRONIC BILATERAL LOW BACK PAIN WITH BILATERAL SCIATICA: ICD-10-CM

## 2024-08-07 DIAGNOSIS — G43.009 MIGRAINE WITHOUT AURA AND WITHOUT STATUS MIGRAINOSUS, NOT INTRACTABLE: ICD-10-CM

## 2024-08-07 DIAGNOSIS — F41.8 DEPRESSION WITH ANXIETY: ICD-10-CM

## 2024-08-07 DIAGNOSIS — M96.1 POST LAMINECTOMY SYNDROME: ICD-10-CM

## 2024-08-07 DIAGNOSIS — F17.200 TOBACCO USE DISORDER: ICD-10-CM

## 2024-08-07 DIAGNOSIS — M48.061 SPINAL STENOSIS OF LUMBAR REGION AT MULTIPLE LEVELS: Primary | ICD-10-CM

## 2024-08-07 DIAGNOSIS — M54.41 CHRONIC BILATERAL LOW BACK PAIN WITH BILATERAL SCIATICA: ICD-10-CM

## 2024-08-07 DIAGNOSIS — F51.01 PRIMARY INSOMNIA: ICD-10-CM

## 2024-08-07 DIAGNOSIS — G89.29 CHRONIC BILATERAL LOW BACK PAIN WITH BILATERAL SCIATICA: ICD-10-CM

## 2024-08-07 DIAGNOSIS — J30.1 SEASONAL ALLERGIC RHINITIS DUE TO POLLEN: ICD-10-CM

## 2024-08-07 PROCEDURE — 4004F PT TOBACCO SCREEN RCVD TLK: CPT | Performed by: INTERNAL MEDICINE

## 2024-08-07 PROCEDURE — 99214 OFFICE O/P EST MOD 30 MIN: CPT | Performed by: INTERNAL MEDICINE

## 2024-08-07 PROCEDURE — 3078F DIAST BP <80 MM HG: CPT | Performed by: INTERNAL MEDICINE

## 2024-08-07 PROCEDURE — 3074F SYST BP LT 130 MM HG: CPT | Performed by: INTERNAL MEDICINE

## 2024-08-07 RX ORDER — METHYLPREDNISOLONE 4 MG/1
TABLET ORAL
Qty: 21 TABLET | Refills: 0 | Status: SHIPPED | OUTPATIENT
Start: 2024-08-07 | End: 2024-08-14

## 2024-08-07 RX ORDER — SUMATRIPTAN SUCCINATE 100 MG/1
100 TABLET ORAL ONCE AS NEEDED
Qty: 10 TABLET | Refills: 11 | Status: SHIPPED | OUTPATIENT
Start: 2024-08-07

## 2024-08-07 ASSESSMENT — ENCOUNTER SYMPTOMS
CONSTITUTIONAL NEGATIVE: 1
BACK PAIN: 1
DEPRESSION: 0
CARDIOVASCULAR NEGATIVE: 1
ACTIVITY CHANGE: 0
CONSTIPATION: 0
COUGH: 0
NECK PAIN: 1
DIARRHEA: 0
LOSS OF SENSATION IN FEET: 0
SHORTNESS OF BREATH: 0
NERVOUS/ANXIOUS: 1
OCCASIONAL FEELINGS OF UNSTEADINESS: 0
DYSURIA: 0
ABDOMINAL PAIN: 0
ARTHRALGIAS: 1
MYALGIAS: 1

## 2024-08-07 ASSESSMENT — COLUMBIA-SUICIDE SEVERITY RATING SCALE - C-SSRS
2. HAVE YOU ACTUALLY HAD ANY THOUGHTS OF KILLING YOURSELF?: NO
1. IN THE PAST MONTH, HAVE YOU WISHED YOU WERE DEAD OR WISHED YOU COULD GO TO SLEEP AND NOT WAKE UP?: NO
6. HAVE YOU EVER DONE ANYTHING, STARTED TO DO ANYTHING, OR PREPARED TO DO ANYTHING TO END YOUR LIFE?: NO

## 2024-08-07 ASSESSMENT — PAIN SCALES - GENERAL: PAINLEVEL: 7

## 2024-08-07 NOTE — PROGRESS NOTES
Subjective   Patient ID: Suly Vance is a 49 y.o. female who presents for Follow-up.    Hypertension-off meds and good     Hyperlipidemia-on no meds.  Lab Results       Component                Value               Date                       LDLCALC                  111                 11/22/2022               Depression with anxiety-taking effexor but always with stress and anxiety-currently without power-doesn't want to increase meds    Chronic pain due to lumbar radiculopathy and post-laminectomy syndrome-no longer seeing pain management. Uses  medical marijuana    Migraines-worse with stress-usually 1-2/week-imitrex helps    Dysphagia/GERD-fairly stable on protonix    Tobacco use--continues to smoke -worse with stress-about 1/2 ppd    Insomnia-using trazadone helps but wakes from pain    Vitamin D def on supplements    Left side sharp pain side of left hip and into groin and will fall when happens. Again having numbness in lower leg    Here with            Review of Systems   Constitutional: Negative.  Negative for activity change.   HENT:          Has new dentures   Respiratory:  Negative for cough and shortness of breath.    Cardiovascular: Negative.    Gastrointestinal:  Negative for abdominal pain, constipation and diarrhea.   Genitourinary:  Negative for dysuria.   Musculoskeletal:  Positive for arthralgias, back pain, gait problem, myalgias and neck pain.   Skin:  Negative for rash.   Psychiatric/Behavioral:  The patient is nervous/anxious.        Objective   /70   Pulse 109   Wt 47.6 kg (105 lb)   SpO2 97%   BMI 20.51 kg/m²     Physical Exam  Constitutional:       General: She is not in acute distress.     Appearance: Normal appearance.   Cardiovascular:      Rate and Rhythm: Normal rate and regular rhythm.      Heart sounds: Normal heart sounds. No murmur heard.     No gallop.   Pulmonary:      Breath sounds: Normal breath sounds. No wheezing, rhonchi or rales.   Musculoskeletal:       Comments: Limited strength left leg   Skin:     General: Skin is warm and dry.      Findings: No rash.      Comments: kala   Neurological:      Mental Status: She is alert and oriented to person, place, and time.      Motor: Weakness: left leg.      Deep Tendon Reflexes: Reflexes abnormal.   Psychiatric:      Comments: frustrated         Assessment/Plan  will start with steroids for presumed pinched nerves  Diagnoses and all orders for this visit:  Spinal stenosis of lumbar region at multiple levels  -     methylPREDNISolone (Medrol Dospak) 4 mg tablets; Take as directed on package.  Primary hypertension  Mixed hyperlipidemia  Seasonal allergic rhinitis due to pollen  Vitamin D deficiency  Dysphagia, unspecified type  Depression with anxiety  Chronic bilateral low back pain with bilateral sciatica  Post laminectomy syndrome  Migraine without aura and without status migrainosus, not intractable  Primary insomnia  Tobacco use disorder  Periodic headache syndrome, not intractable  -     SUMAtriptan (Imitrex) 100 mg tablet; Take 1 tablet (100 mg) by mouth 1 time if needed for migraine. AT LEAST 2 HOURS BETWEEN DOSES AS NEEDED FOR HEADACHE  Other orders  -     Follow Up In Primary Care - Established    Current Outpatient Medications:     chlorzoxazone (Parafon Forte) 500 mg tablet, Take 1 tablet (500 mg) by mouth 4 times a day as needed for muscle spasms., Disp: 120 tablet, Rfl: 11    cholecalciferol (Vitamin D3) 50 MCG (2000 UT) tablet, Take 1 tablet (2,000 Units) by mouth once daily., Disp: 90 tablet, Rfl: 3    gabapentin (Neurontin) 600 mg tablet, Take 1 tablet (600 mg) by mouth 3 times a day., Disp: 270 tablet, Rfl: 3    pantoprazole (ProtoNix) 40 mg EC tablet, Take 1 tablet (40 mg) by mouth once daily., Disp: , Rfl:     traZODone (Desyrel) 100 mg tablet, Take 2 tablets (200 mg) by mouth once daily at bedtime., Disp: 60 tablet, Rfl: 11    methylPREDNISolone (Medrol Dospak) 4 mg tablets, Take as directed on  package., Disp: 21 tablet, Rfl: 0    SUMAtriptan (Imitrex) 100 mg tablet, Take 1 tablet (100 mg) by mouth 1 time if needed for migraine. AT LEAST 2 HOURS BETWEEN DOSES AS NEEDED FOR HEADACHE, Disp: 10 tablet, Rfl: 11    venlafaxine XR (Effexor-XR) 75 mg 24 hr capsule, Take 1 capsule (75 mg) by mouth once daily. Take with food., Disp: 30 capsule, Rfl: 11

## 2024-08-27 ENCOUNTER — OFFICE VISIT (OUTPATIENT)
Dept: PRIMARY CARE | Facility: CLINIC | Age: 49
End: 2024-08-27
Payer: COMMERCIAL

## 2024-08-27 VITALS
OXYGEN SATURATION: 98 % | HEART RATE: 76 BPM | SYSTOLIC BLOOD PRESSURE: 124 MMHG | WEIGHT: 107.6 LBS | DIASTOLIC BLOOD PRESSURE: 68 MMHG | BODY MASS INDEX: 21.01 KG/M2

## 2024-08-27 DIAGNOSIS — M96.1 POST LAMINECTOMY SYNDROME: Primary | ICD-10-CM

## 2024-08-27 PROCEDURE — 3078F DIAST BP <80 MM HG: CPT | Performed by: INTERNAL MEDICINE

## 2024-08-27 PROCEDURE — 3074F SYST BP LT 130 MM HG: CPT | Performed by: INTERNAL MEDICINE

## 2024-08-27 PROCEDURE — 99214 OFFICE O/P EST MOD 30 MIN: CPT | Performed by: INTERNAL MEDICINE

## 2024-08-27 PROCEDURE — 4004F PT TOBACCO SCREEN RCVD TLK: CPT | Performed by: INTERNAL MEDICINE

## 2024-08-27 RX ORDER — PREDNISONE 10 MG/1
10 TABLET ORAL DAILY
Qty: 21 TABLET | Refills: 0 | Status: SHIPPED | OUTPATIENT
Start: 2024-08-27 | End: 2025-02-23

## 2024-08-27 ASSESSMENT — ENCOUNTER SYMPTOMS
DEPRESSION: 0
LOSS OF SENSATION IN FEET: 0
OCCASIONAL FEELINGS OF UNSTEADINESS: 0

## 2024-08-27 ASSESSMENT — PATIENT HEALTH QUESTIONNAIRE - PHQ9
2. FEELING DOWN, DEPRESSED OR HOPELESS: NOT AT ALL
1. LITTLE INTEREST OR PLEASURE IN DOING THINGS: NOT AT ALL
SUM OF ALL RESPONSES TO PHQ9 QUESTIONS 1 AND 2: 0

## 2024-08-27 ASSESSMENT — PAIN SCALES - GENERAL: PAINLEVEL: 5

## 2024-08-27 NOTE — PROGRESS NOTES
Subjective   Patient ID: Suly Vanec is a 49 y.o. female who presents for ER Follow-up.       Last week had been having issues lifting leg in and out of bed, car or tub. Progressed to having trouble walking with weakness and giving out of left leg. Steroids helped but pain came back anytime started to move more. Always in pain. Reaching point of considering talking to a back surgeon again--has referral to back doctor.      Has had multiple falls when legs gives out-discussed using walker to prevent falls.     Reviewed ER records including notes, xray and lab results -incl CT spine and hip/pelvis                                                                    Objective   /68 (BP Location: Left arm, Patient Position: Sitting)   Pulse 76   Wt 48.8 kg (107 lb 9.6 oz)   SpO2 98%   BMI 21.01 kg/m²     Physical Exam needs help to stand-decrease strength left leg    Assessment/Plan   Diagnoses and all orders for this visit:  Post laminectomy syndrome  -     predniSONE (Deltasone) 10 mg tablet; Take 1 tablet (10 mg) by mouth once daily.    Will use low dose prednisone til sees surgeon-she understands this is a temporizing step not a long term solution

## 2024-09-03 DIAGNOSIS — R13.10 DYSPHAGIA, UNSPECIFIED TYPE: Primary | ICD-10-CM

## 2024-09-03 RX ORDER — PANTOPRAZOLE SODIUM 40 MG/1
40 TABLET, DELAYED RELEASE ORAL DAILY
Qty: 30 TABLET | Refills: 11 | Status: SHIPPED | OUTPATIENT
Start: 2024-09-03

## 2024-09-10 DIAGNOSIS — M51.36 DEGENERATION OF LUMBAR INTERVERTEBRAL DISC: ICD-10-CM

## 2024-09-10 RX ORDER — CHLORZOXAZONE 500 MG/1
500 TABLET ORAL 4 TIMES DAILY PRN
Qty: 120 TABLET | Refills: 11 | Status: SHIPPED | OUTPATIENT
Start: 2024-09-10

## 2024-09-27 ENCOUNTER — OFFICE VISIT (OUTPATIENT)
Dept: PRIMARY CARE | Facility: CLINIC | Age: 49
End: 2024-09-27
Payer: COMMERCIAL

## 2024-09-27 VITALS
HEART RATE: 98 BPM | OXYGEN SATURATION: 99 % | WEIGHT: 104.6 LBS | DIASTOLIC BLOOD PRESSURE: 78 MMHG | BODY MASS INDEX: 20.43 KG/M2 | SYSTOLIC BLOOD PRESSURE: 130 MMHG

## 2024-09-27 DIAGNOSIS — M48.061 SPINAL STENOSIS OF LUMBAR REGION AT MULTIPLE LEVELS: ICD-10-CM

## 2024-09-27 DIAGNOSIS — F51.01 PRIMARY INSOMNIA: ICD-10-CM

## 2024-09-27 DIAGNOSIS — I10 PRIMARY HYPERTENSION: ICD-10-CM

## 2024-09-27 DIAGNOSIS — M54.41 CHRONIC BILATERAL LOW BACK PAIN WITH BILATERAL SCIATICA: ICD-10-CM

## 2024-09-27 DIAGNOSIS — E55.9 VITAMIN D DEFICIENCY: ICD-10-CM

## 2024-09-27 DIAGNOSIS — Z23 NEED FOR INFLUENZA VACCINATION: ICD-10-CM

## 2024-09-27 DIAGNOSIS — M51.36 DEGENERATION OF LUMBAR INTERVERTEBRAL DISC: ICD-10-CM

## 2024-09-27 DIAGNOSIS — M96.1 POST LAMINECTOMY SYNDROME: Primary | ICD-10-CM

## 2024-09-27 DIAGNOSIS — N95.2 ATROPHIC VAGINITIS: ICD-10-CM

## 2024-09-27 DIAGNOSIS — E78.2 MIXED HYPERLIPIDEMIA: ICD-10-CM

## 2024-09-27 DIAGNOSIS — M62.830 PARASPINAL MUSCLE SPASM: ICD-10-CM

## 2024-09-27 DIAGNOSIS — R13.10 DYSPHAGIA, UNSPECIFIED TYPE: ICD-10-CM

## 2024-09-27 DIAGNOSIS — G43.009 MIGRAINE WITHOUT AURA AND WITHOUT STATUS MIGRAINOSUS, NOT INTRACTABLE: ICD-10-CM

## 2024-09-27 DIAGNOSIS — M54.42 CHRONIC BILATERAL LOW BACK PAIN WITH BILATERAL SCIATICA: ICD-10-CM

## 2024-09-27 DIAGNOSIS — G89.29 CHRONIC BILATERAL LOW BACK PAIN WITH BILATERAL SCIATICA: ICD-10-CM

## 2024-09-27 DIAGNOSIS — F41.8 DEPRESSION WITH ANXIETY: ICD-10-CM

## 2024-09-27 DIAGNOSIS — F17.200 TOBACCO USE DISORDER: ICD-10-CM

## 2024-09-27 PROCEDURE — 90471 IMMUNIZATION ADMIN: CPT | Performed by: INTERNAL MEDICINE

## 2024-09-27 PROCEDURE — 90656 IIV3 VACC NO PRSV 0.5 ML IM: CPT | Performed by: INTERNAL MEDICINE

## 2024-09-27 PROCEDURE — 99214 OFFICE O/P EST MOD 30 MIN: CPT | Performed by: INTERNAL MEDICINE

## 2024-09-27 PROCEDURE — 3078F DIAST BP <80 MM HG: CPT | Performed by: INTERNAL MEDICINE

## 2024-09-27 PROCEDURE — 4004F PT TOBACCO SCREEN RCVD TLK: CPT | Performed by: INTERNAL MEDICINE

## 2024-09-27 PROCEDURE — 3075F SYST BP GE 130 - 139MM HG: CPT | Performed by: INTERNAL MEDICINE

## 2024-09-27 RX ORDER — TRAMADOL HYDROCHLORIDE 50 MG/1
50 TABLET ORAL EVERY 6 HOURS PRN
Qty: 30 TABLET | Refills: 0 | Status: SHIPPED | OUTPATIENT
Start: 2024-09-27 | End: 2024-10-07

## 2024-09-27 RX ORDER — BACLOFEN 20 MG/1
20 TABLET ORAL 3 TIMES DAILY
Qty: 90 TABLET | Refills: 11 | Status: SHIPPED | OUTPATIENT
Start: 2024-09-27 | End: 2025-09-27

## 2024-09-27 RX ORDER — NALOXONE HYDROCHLORIDE 4 MG/.1ML
1 SPRAY NASAL AS NEEDED
Qty: 2 EACH | Refills: 0 | Status: SHIPPED | OUTPATIENT
Start: 2024-09-27

## 2024-09-27 ASSESSMENT — ENCOUNTER SYMPTOMS
NECK PAIN: 1
LOSS OF SENSATION IN FEET: 0
COUGH: 0
DYSURIA: 0
CONSTIPATION: 0
ABDOMINAL PAIN: 0
SHORTNESS OF BREATH: 0
OCCASIONAL FEELINGS OF UNSTEADINESS: 0
BACK PAIN: 1
ACTIVITY CHANGE: 0
CARDIOVASCULAR NEGATIVE: 1
DIARRHEA: 0
MYALGIAS: 1
NERVOUS/ANXIOUS: 1
DEPRESSION: 0
ARTHRALGIAS: 1
CONSTITUTIONAL NEGATIVE: 1

## 2024-09-27 ASSESSMENT — PAIN SCALES - GENERAL: PAINLEVEL: 8

## 2024-09-27 NOTE — PROGRESS NOTES
Subjective   Patient ID: Suly Vance is a 49 y.o. female who presents for Follow-up.    Hypertension-off meds and good     Hyperlipidemia-on no meds.  Lab Results       Component                Value               Date                       LDLCALC                  111                 11/22/2022               Depression with anxiety-taking effexor but always with stress and anxiety-thinks it's helping    Chronic pain due to lumbar radiculopathy and post-laminectomy syndrome-no longer seeing pain management. Uses  medical marijuana.  Having pain and numbness bilat down legs and into groin--miserable despite the medical marijuana. Using rollator now because if doesn't will fall because legs go out.     Migraines-worse with stress-usually 1-2/week-imitrex helps    Dysphagia/GERD-fairly stable on protonix    Tobacco use--continues to smoke -worse with stress-about 1/2 ppd    Insomnia-using trazadone helps but wakes from pain    Vitamin D def on supplements    Saw NP at [facility] about hr back-to discuss surg-she didn't like her at all and is reluctant to try therapy or injection before having MRI.            Review of Systems   Constitutional: Negative.  Negative for activity change.   HENT:          Has new dentures   Respiratory:  Negative for cough and shortness of breath.    Cardiovascular: Negative.    Gastrointestinal:  Negative for abdominal pain, constipation and diarrhea.   Genitourinary:  Negative for dysuria.   Musculoskeletal:  Positive for arthralgias, back pain, gait problem, myalgias and neck pain.   Skin:  Negative for rash.   Psychiatric/Behavioral:  The patient is nervous/anxious.        Objective   /78 (BP Location: Left arm, Patient Position: Sitting)   Pulse 98   Wt 47.4 kg (104 lb 9.6 oz)   SpO2 99%   BMI 20.43 kg/m²     Physical Exam  Constitutional:       General: She is not in acute distress.     Appearance: Normal appearance.   Cardiovascular:      Rate and Rhythm: Normal rate and regular  rhythm.      Heart sounds: Normal heart sounds. No murmur heard.     No gallop.   Pulmonary:      Breath sounds: Normal breath sounds. No wheezing, rhonchi or rales.   Musculoskeletal:      Comments: Limited strength left leg   Skin:     General: Skin is warm and dry.      Findings: No rash.      Comments: kala   Neurological:      Mental Status: She is alert and oriented to person, place, and time.      Motor: Weakness: left leg.      Deep Tendon Reflexes: Reflexes abnormal.   Psychiatric:      Comments: frustrated       Assessment/Plan  will refer for pt-aquatherapy; try baclofen again; will see pain management for injection; agree to 10 days worth of tramadol for when really bad-knows not to use when really bad  Diagnoses and all orders for this visit:  Post laminectomy syndrome  -     Referral to Physical Therapy; Future  -     traMADol (Ultram) 50 mg tablet; Take 1 tablet (50 mg) by mouth every 6 hours if needed for severe pain (7 - 10) for up to 10 days.  -     naloxone (Narcan) 4 mg/0.1 mL nasal spray; Administer 1 spray (4 mg) into affected nostril(s) if needed for opioid reversal. May repeat every 2-3 minutes if needed, alternating nostrils, until medical assistance becomes available.  Primary hypertension  Mixed hyperlipidemia  Vitamin D deficiency  Dysphagia, unspecified type  Atrophic vaginitis  Depression with anxiety  Chronic bilateral low back pain with bilateral sciatica  Degeneration of lumbar intervertebral disc  Migraine without aura and without status migrainosus, not intractable  Spinal stenosis of lumbar region at multiple levels  -     Referral to Physical Therapy; Future  Primary insomnia  Tobacco use disorder  Need for influenza vaccination  -     Flu vaccine, trivalent, preservative free, age 6 months and greater (Fluarix/Fluzone/Flulaval)  Paraspinal muscle spasm  -     baclofen (Lioresal) 20 mg tablet; Take 1 tablet (20 mg) by mouth 3 times a day.

## 2024-10-08 NOTE — PROGRESS NOTES
"[facility]  Follow Up Office Visit Note 10/9/2024    Patient Information: Suly Vance MRN: [id number], : 1975   Primary Care/Referring Physician: Michaela Arthur MD, [address] [facility], [address] [place]*     Chief Complaint: Low back and left leg pain    Interval History: Ms. Vance presents today for follow up. At her last visit I discussed taking over Tramadol but she would have to stop using THC    Today she reports worsening bilateral groin/medial thigh pain since last seen. She continues to have pain radiating further down her left leg and she has to avoid weight on her left foot. Her pain is worse with sitting. Her left leg occasionally goes numb.     Brief History of Pain: Ms. Suly Vance is a 49 y.o. female with a PMHx of HTN, HLD, depression, anxiety, migraines, ?seizure disorder who presents for low back and left leg pain.    She reports history of a \"low back fracture\" in  after moving heavy furniture, which required decompression and posterior instrumented fusion around  or . She reports gradually progressive pain since her surgery. She describes midline pain near her lumbosacral junction which radiates down her left lateral leg to the level of the ankle. She gets left anterior thigh and knee numbness, and left lateral calf numbness. Denies any major issues in her right leg. Her pain worsens with prolonged sitting, which particularly bothers her tailbone, and prolonged standing. Her pain improves when she is walking.     Current Pain Medications: Medical THC, Gabapentin 600 mg TID, Chlorzoxazone 500 mg QID  Previously Tried Pain Medications: Tizanidine, Morphine, Percocet, Pregabalin, Duloxetine - caused mood changes. Tramadol 100 mg q6h PRN. Avoids NSAID's due to history of GI ulcer    Relevant Surgeries: Hx of L4/5 decompression/fusion w/ artificial disc  Injections: Underwent steroid injections in the past with only very short term pain " "relief  Physical/Occupational Therapy: Has done PT in the past with no improvement.     Medications:   Current Outpatient Medications   Medication Instructions    baclofen (LIORESAL) 20 mg, oral, 3 times daily    chlorzoxazone (PARAFON FORTE) 500 mg, oral, 4 times daily PRN    cholecalciferol (VITAMIN D3) 2,000 Units, oral, Daily    gabapentin (NEURONTIN) 600 mg, oral, 4 times daily    naloxone (NARCAN) 4 mg, nasal, As needed, May repeat every 2-3 minutes if needed, alternating nostrils, until medical assistance becomes available.    pantoprazole (PROTONIX) 40 mg, oral, Daily    SUMAtriptan (IMITREX) 100 mg, oral, Once as needed, AT LEAST 2 HOURS BETWEEN DOSES AS NEEDED FOR HEADACHE    traZODone (DESYREL) 200 mg, oral, Nightly    venlafaxine XR (EFFEXOR-XR) 75 mg, oral, Daily, Take with food.      Allergies:   Allergies   Allergen Reactions    Pregabalin Other    Duloxetine Other and Tinnitus     \"Moppy\"    Pseudoephedrine Runny nose    Topiramate Other    Zofran [Ondansetron Hcl] Headache    Nicotine Rash       Past Medical & Surgical History:  Past Medical History:   Diagnosis Date    GERD (gastroesophageal reflux disease)       Past Surgical History:   Procedure Laterality Date    OTHER SURGICAL HISTORY  2022     section    OTHER SURGICAL HISTORY  2022    Scar revision    OTHER SURGICAL HISTORY  2022    Back surgery    OTHER SURGICAL HISTORY  2022    Tonsillectomy       Family History   Problem Relation Name Age of Onset    Cancer Mother      Stroke Mother      Hypertension Mother      Cervical cancer Mother      Mental illness Mother      Hypertension Father      Cancer Sister      Cervical cancer Sister      Heart disease Maternal Grandmother      Cancer Paternal Grandmother      Breast cancer Paternal Grandmother          metastatic    Parkinsonism Paternal Grandfather       Social History     Socioeconomic History    Marital status:      Spouse name: Not on file    " Number of children: Not on file    Years of education: Not on file    Highest education level: Not on file   Occupational History    Not on file   Tobacco Use    Smoking status: Every Day     Current packs/day: 0.50     Types: Cigarettes     Passive exposure: Current    Smokeless tobacco: Never   Vaping Use    Vaping status: Some Days    Substances: THC    Devices: Pre-filled or refillable cartridge   Substance and Sexual Activity    Alcohol use: Not Currently    Drug use: Yes     Types: Marijuana     Comment: occasionally    Sexual activity: Yes   Other Topics Concern    Not on file   Social History Narrative    Not on file     Social Determinants of Health     Financial Resource Strain: Not on file   Food Insecurity: Not on file   Transportation Needs: Not on file   Physical Activity: Not on file   Stress: Not on file   Social Connections: Not on file   Intimate Partner Violence: Not on file   Housing Stability: Not on file       Problems, Past medical history, past surgical history, Medications, allergies, social and family history reviewed and as per the electronic medical record from today's encounter    Review of Systems:  CONST: No fever, chills, fatigue, weight changes  EYES: No loss of vision  ENT: No hearing loss, tinnitus  CV: No chest pain, palpitations  RESP: No dyspnea, shortness of breath, cough  GI: No stool incontinence, nausea, vomiting  : No urinary incontinence  MSK: No joint swelling  SKIN: No rash, no hives  NEURO: No headache, dizziness  PSYCH: No anxiety, depression  HEM/LYMPH: No easy bruising or bleeding  All other systems reviewed are negative     Physical Exam:  Vitals: /78   Pulse 78   Resp 18   Ht 1.524 m (5')   Wt 47.2 kg (104 lb)   SpO2 98%   BMI 20.31 kg/m²   General: No apparent distress. Alert, appropriate, oriented x 3. Mood and affect normal. Speaking in full sentences.  HENT: Normocephalic, atraumatic. Hearing intact.  Eyes: Extraocular movements grossly intact.  "Pupils equal and round.   Neck: Supple, trachea midline.  Lungs: Symmetric respiratory excursion on visual exam, nonlabored breathing.  Extremities: No clubbing, cyanosis, or edema noted in arms or legs.  Skin: No rashes, lesions, alopecia noted on back or extremities. Well healed vertical incisions noted on low back.   Neuro: Alert and appropriate. Ambulating with a rollator. Bulk and tone within normal limits.    Laboratory Data:  The following laboratory data were reviewed during this visit:   Lab Results   Component Value Date    WBC 7.2 06/19/2023    RBC 4.42 06/19/2023    HGB 13.2 06/19/2023    HCT 39.9 06/19/2023     06/19/2023      No results found for: \"INR\"  Lab Results   Component Value Date    CREATININE 0.7 06/19/2023       Imaging:  The following imaging impressions were reviewed by me during this visit:    -4/17/18 lumbar spine MRI with L1/2 broad-based disc bulging causing mild to moderate central canal narrowing. L3/4 facet arthropathy. L4/5 laminectomy noted with three mm anterior subluxation, mild to moderate bilateral neural foramen narrowing.    I also personally reviewed the images from the above studies myself. These images and my interpretation of them contributed to the management and decision making of the patient's medical plan.    ASSESSMENT:  Ms. Suly Vance is a 49 y.o. female with low back and left leg pain that is consistent with:    1. Postlaminectomy syndrome of lumbar region    2. Vertebrogenic low back pain    3. Lumbar facet arthropathy    4. Myofascial pain          PLAN:    Diagnostics:   - I reviewed her lumbar spine MRI from 2018 (see above for details). Given worsening of bilateral leg pain with intermittent numbness in her left leg I recommend a new lumbar spine MRI for diagnostic and interventional planning purposes    Physical Therapy and Rehabilitation:     - Continue your current HEP    Psychologically:  - No needs at this time    Medications  - I did have a " long discussion with her about medication options today. I told her that I would be willing to take over prescription of her Tramadol, but she would no longer be able to use medical THC per  policy. Additionally, my plan would be to taper down her Tramadol dose and trial other non-opioid medications along with interventional treatment to better control her pain. She states that she will have to think about this  - Recommend increasing to Gabapentin 600 mg QID.    Duration  - Multiple years    Interventions:  - I suspect her pain is multi-factorial secondary to lumbar post-laminectomy syndrome, lumbar spondylosis, and myofascial pain. There are likely interventional options such as lumbar mbb's/RFA, lumbar RENAN, and spinal cord stimulation. Will await the results of her lumbar spine MRI prior to discussing intervention        Sincerely,  Lázaro Cowan MD  [facility] - [place]

## 2024-10-09 ENCOUNTER — OFFICE VISIT (OUTPATIENT)
Dept: PAIN MEDICINE | Facility: CLINIC | Age: 49
End: 2024-10-09
Payer: COMMERCIAL

## 2024-10-09 VITALS
OXYGEN SATURATION: 98 % | HEART RATE: 78 BPM | BODY MASS INDEX: 20.42 KG/M2 | DIASTOLIC BLOOD PRESSURE: 78 MMHG | HEIGHT: 60 IN | WEIGHT: 104 LBS | RESPIRATION RATE: 18 BRPM | SYSTOLIC BLOOD PRESSURE: 134 MMHG

## 2024-10-09 DIAGNOSIS — M96.1 POSTLAMINECTOMY SYNDROME OF LUMBAR REGION: Primary | ICD-10-CM

## 2024-10-09 DIAGNOSIS — M54.51 VERTEBROGENIC LOW BACK PAIN: ICD-10-CM

## 2024-10-09 DIAGNOSIS — M47.816 LUMBAR FACET ARTHROPATHY: ICD-10-CM

## 2024-10-09 DIAGNOSIS — M79.18 MYOFASCIAL PAIN: ICD-10-CM

## 2024-10-09 PROCEDURE — 3008F BODY MASS INDEX DOCD: CPT | Performed by: STUDENT IN AN ORGANIZED HEALTH CARE EDUCATION/TRAINING PROGRAM

## 2024-10-09 PROCEDURE — 3075F SYST BP GE 130 - 139MM HG: CPT | Performed by: STUDENT IN AN ORGANIZED HEALTH CARE EDUCATION/TRAINING PROGRAM

## 2024-10-09 PROCEDURE — 4004F PT TOBACCO SCREEN RCVD TLK: CPT | Performed by: STUDENT IN AN ORGANIZED HEALTH CARE EDUCATION/TRAINING PROGRAM

## 2024-10-09 PROCEDURE — 99214 OFFICE O/P EST MOD 30 MIN: CPT | Performed by: STUDENT IN AN ORGANIZED HEALTH CARE EDUCATION/TRAINING PROGRAM

## 2024-10-09 PROCEDURE — 3078F DIAST BP <80 MM HG: CPT | Performed by: STUDENT IN AN ORGANIZED HEALTH CARE EDUCATION/TRAINING PROGRAM

## 2024-10-09 RX ORDER — GABAPENTIN 600 MG/1
600 TABLET ORAL 4 TIMES DAILY
Qty: 270 TABLET | Refills: 3 | Status: SHIPPED | OUTPATIENT
Start: 2024-10-09

## 2024-10-09 ASSESSMENT — ENCOUNTER SYMPTOMS
OCCASIONAL FEELINGS OF UNSTEADINESS: 1
LOSS OF SENSATION IN FEET: 1
DEPRESSION: 1

## 2024-10-09 ASSESSMENT — PATIENT HEALTH QUESTIONNAIRE - PHQ9
3. TROUBLE FALLING OR STAYING ASLEEP OR SLEEPING TOO MUCH: NEARLY EVERY DAY
5. POOR APPETITE OR OVEREATING: NEARLY EVERY DAY
SUM OF ALL RESPONSES TO PHQ9 QUESTIONS 1 AND 2: 6
8. MOVING OR SPEAKING SO SLOWLY THAT OTHER PEOPLE COULD HAVE NOTICED. OR THE OPPOSITE, BEING SO FIGETY OR RESTLESS THAT YOU HAVE BEEN MOVING AROUND A LOT MORE THAN USUAL: NEARLY EVERY DAY
4. FEELING TIRED OR HAVING LITTLE ENERGY: NEARLY EVERY DAY
9. THOUGHTS THAT YOU WOULD BE BETTER OFF DEAD, OR OF HURTING YOURSELF: NOT AT ALL
7. TROUBLE CONCENTRATING ON THINGS, SUCH AS READING THE NEWSPAPER OR WATCHING TELEVISION: NEARLY EVERY DAY
10. IF YOU CHECKED OFF ANY PROBLEMS, HOW DIFFICULT HAVE THESE PROBLEMS MADE IT FOR YOU TO DO YOUR WORK, TAKE CARE OF THINGS AT HOME, OR GET ALONG WITH OTHER PEOPLE: VERY DIFFICULT
1. LITTLE INTEREST OR PLEASURE IN DOING THINGS: NEARLY EVERY DAY
SUM OF ALL RESPONSES TO PHQ QUESTIONS 1-9: 24
6. FEELING BAD ABOUT YOURSELF - OR THAT YOU ARE A FAILURE OR HAVE LET YOURSELF OR YOUR FAMILY DOWN: NEARLY EVERY DAY
2. FEELING DOWN, DEPRESSED OR HOPELESS: NEARLY EVERY DAY

## 2024-10-09 ASSESSMENT — PAIN - FUNCTIONAL ASSESSMENT: PAIN_FUNCTIONAL_ASSESSMENT: 0-10

## 2024-10-09 ASSESSMENT — PAIN SCALES - GENERAL
PAINLEVEL: 6
PAINLEVEL_OUTOF10: 6

## 2024-10-09 ASSESSMENT — PAIN DESCRIPTION - DESCRIPTORS: DESCRIPTORS: ACHING;SHARP;THROBBING

## 2024-10-24 ENCOUNTER — HOSPITAL ENCOUNTER (OUTPATIENT)
Dept: RADIOLOGY | Facility: HOSPITAL | Age: 49
Discharge: HOME | End: 2024-10-24
Payer: COMMERCIAL

## 2024-10-24 DIAGNOSIS — M96.1 POSTLAMINECTOMY SYNDROME OF LUMBAR REGION: ICD-10-CM

## 2024-10-24 PROCEDURE — 72148 MRI LUMBAR SPINE W/O DYE: CPT

## 2024-10-25 ENCOUNTER — DOCUMENTATION (OUTPATIENT)
Dept: PHYSICAL THERAPY | Facility: CLINIC | Age: 49
End: 2024-10-25
Payer: COMMERCIAL

## 2024-10-25 ENCOUNTER — APPOINTMENT (OUTPATIENT)
Dept: PHYSICAL THERAPY | Facility: CLINIC | Age: 49
End: 2024-10-25
Payer: COMMERCIAL

## 2024-10-25 NOTE — PROGRESS NOTES
Physical Therapy                 Therapy Communication Note    Patient Name: Suly Vance  MRN: [id number]  Department:   Room: Room/bed info not found  Today's Date: 10/25/2024     Discipline: Physical Therapy      Missed Time: Cancel- evaluation

## 2024-10-30 ENCOUNTER — TELEMEDICINE (OUTPATIENT)
Dept: PAIN MEDICINE | Facility: CLINIC | Age: 49
End: 2024-10-30
Payer: COMMERCIAL

## 2024-10-30 VITALS — WEIGHT: 104 LBS | HEIGHT: 60 IN | BODY MASS INDEX: 20.42 KG/M2

## 2024-10-30 DIAGNOSIS — M54.51 VERTEBROGENIC LOW BACK PAIN: ICD-10-CM

## 2024-10-30 DIAGNOSIS — M47.816 LUMBAR FACET ARTHROPATHY: ICD-10-CM

## 2024-10-30 DIAGNOSIS — M96.1 LUMBAR POST-LAMINECTOMY SYNDROME: Primary | ICD-10-CM

## 2024-10-30 DIAGNOSIS — M79.18 MYOFASCIAL PAIN: ICD-10-CM

## 2024-10-30 DIAGNOSIS — M48.061 LUMBAR FORAMINAL STENOSIS: ICD-10-CM

## 2024-10-30 PROCEDURE — 3008F BODY MASS INDEX DOCD: CPT | Performed by: STUDENT IN AN ORGANIZED HEALTH CARE EDUCATION/TRAINING PROGRAM

## 2024-10-30 PROCEDURE — G2211 COMPLEX E/M VISIT ADD ON: HCPCS | Performed by: STUDENT IN AN ORGANIZED HEALTH CARE EDUCATION/TRAINING PROGRAM

## 2024-10-30 PROCEDURE — 99214 OFFICE O/P EST MOD 30 MIN: CPT | Performed by: STUDENT IN AN ORGANIZED HEALTH CARE EDUCATION/TRAINING PROGRAM

## 2024-10-30 PROCEDURE — 4004F PT TOBACCO SCREEN RCVD TLK: CPT | Performed by: STUDENT IN AN ORGANIZED HEALTH CARE EDUCATION/TRAINING PROGRAM

## 2024-10-30 ASSESSMENT — PAIN SCALES - GENERAL
PAINLEVEL_OUTOF10: 5
PAINLEVEL_OUTOF10: 5 - MODERATE PAIN

## 2024-10-30 ASSESSMENT — PAIN - FUNCTIONAL ASSESSMENT: PAIN_FUNCTIONAL_ASSESSMENT: 0-10

## 2024-10-30 ASSESSMENT — PAIN DESCRIPTION - DESCRIPTORS: DESCRIPTORS: SHOOTING;BURNING

## 2024-11-02 DIAGNOSIS — F51.01 PRIMARY INSOMNIA: ICD-10-CM

## 2024-11-04 ENCOUNTER — OFFICE VISIT (OUTPATIENT)
Dept: ORTHOPEDIC SURGERY | Facility: CLINIC | Age: 49
End: 2024-11-04
Payer: COMMERCIAL

## 2024-11-04 DIAGNOSIS — M48.061 LUMBAR FORAMINAL STENOSIS: ICD-10-CM

## 2024-11-04 DIAGNOSIS — M96.1 LUMBAR POST-LAMINECTOMY SYNDROME: ICD-10-CM

## 2024-11-04 PROCEDURE — 99204 OFFICE O/P NEW MOD 45 MIN: CPT | Performed by: ORTHOPAEDIC SURGERY

## 2024-11-04 PROCEDURE — 99214 OFFICE O/P EST MOD 30 MIN: CPT | Performed by: ORTHOPAEDIC SURGERY

## 2024-11-04 RX ORDER — TRAZODONE HYDROCHLORIDE 100 MG/1
TABLET ORAL
Qty: 60 TABLET | Refills: 11 | Status: SHIPPED | OUTPATIENT
Start: 2024-11-04

## 2024-11-04 ASSESSMENT — ENCOUNTER SYMPTOMS
DEPRESSION: 1
OCCASIONAL FEELINGS OF UNSTEADINESS: 1
LOSS OF SENSATION IN FEET: 1

## 2024-11-04 ASSESSMENT — PAIN - FUNCTIONAL ASSESSMENT: PAIN_FUNCTIONAL_ASSESSMENT: 0-10

## 2024-11-04 ASSESSMENT — PAIN SCALES - GENERAL: PAINLEVEL_OUTOF10: 10 - WORST POSSIBLE PAIN

## 2024-11-04 NOTE — PROGRESS NOTES
49-year-old female with chief complaint of lower back pain and bilateral leg radicular pain.  The left leg is more affected than the right.  Symptoms are made worse with standing and walking, improves somewhat with rest though she does have some burning in the leg at rest.    Since his symptoms started a couple of months ago she has not had any formal treatment.  She is ready to start aquatic physical therapy and has an active prescription and she will be starting in the near future.    She has a remote history of L4-5 laminectomy and fusion with TLIF for similar symptoms in 2007.  This was done in [place].  She had good results with surgery.    She has had injections in the past, nothing recently.    She is otherwise in fairly good health.  Denies bowel or bladder incontinence.    She smokes about a half a pack of cigarettes a day.    On exam she is well-appearing and in no distress.  She walks with a forward leaning gait using a rollator walker.  No focal neurologic deficits in the legs.    I personally reviewed MRI of her lumbar spine.  This demonstrates well-healed fusion at L4-5.  At L3-4 there is moderate central and lateral recess stenosis with disc space collapse.    49-year-old female with junctional lumbar spinal stenosis.  We discussed the fact that she has not really had any conservative treatment since the symptoms really started bothering her a few months ago.  I did encourage her to complete a course of physical therapy.    She is also working with Dr. Cowan, I think it is worthwhile to try 1 injection as well.    If she does not improve with conservative measures she would be a good surgical candidate, she absolutely needs to cease all nicotine use prior to surgery.  She is going to work on starting quitting at this point.    She can follow-up with me as needed if she does not improve to discuss surgical treatment at greater length.  We did discuss it briefly today, she would be a good  candidate for lateral lumbar fusion.    *This note was dictated using speech recognition software and was not corrected for spelling or grammatical errors*    Past Medical History:   Diagnosis Date    GERD (gastroesophageal reflux disease)          Current Outpatient Medications:     chlorzoxazone (Parafon Forte) 500 mg tablet, Take 1 tablet (500 mg) by mouth 4 times a day as needed for muscle spasms., Disp: 120 tablet, Rfl: 11    cholecalciferol (Vitamin D3) 50 MCG (2000 UT) tablet, Take 1 tablet (2,000 Units) by mouth once daily., Disp: 90 tablet, Rfl: 3    gabapentin (Neurontin) 600 mg tablet, Take 1 tablet (600 mg) by mouth 4 times a day., Disp: 270 tablet, Rfl: 3    naloxone (Narcan) 4 mg/0.1 mL nasal spray, Administer 1 spray (4 mg) into affected nostril(s) if needed for opioid reversal. May repeat every 2-3 minutes if needed, alternating nostrils, until medical assistance becomes available., Disp: 2 each, Rfl: 0    pantoprazole (ProtoNix) 40 mg EC tablet, TAKE 1 TABLET BY MOUTH EVERY DAY, Disp: 30 tablet, Rfl: 11    SUMAtriptan (Imitrex) 100 mg tablet, Take 1 tablet (100 mg) by mouth 1 time if needed for migraine. AT LEAST 2 HOURS BETWEEN DOSES AS NEEDED FOR HEADACHE, Disp: 10 tablet, Rfl: 11    traZODone (Desyrel) 100 mg tablet, Take 2 tablets (200 mg) by mouth once daily at bedtime., Disp: 60 tablet, Rfl: 11    baclofen (Lioresal) 20 mg tablet, Take 1 tablet (20 mg) by mouth 3 times a day. (Patient not taking: Reported on 10/30/2024), Disp: 90 tablet, Rfl: 11    venlafaxine XR (Effexor-XR) 75 mg 24 hr capsule, Take 1 capsule (75 mg) by mouth once daily. Take with food., Disp: 30 capsule, Rfl: 11     Social History     Socioeconomic History    Marital status:      Spouse name: Not on file    Number of children: Not on file    Years of education: Not on file    Highest education level: Not on file   Occupational History    Not on file   Tobacco Use    Smoking status: Every Day     Current packs/day:  0.50     Types: Cigarettes     Passive exposure: Current    Smokeless tobacco: Never   Vaping Use    Vaping status: Some Days    Substances: THC    Devices: Pre-filled or refillable cartridge   Substance and Sexual Activity    Alcohol use: Not Currently    Drug use: Yes     Types: Marijuana     Comment: occasionally    Sexual activity: Yes   Other Topics Concern    Not on file   Social History Narrative    Not on file     Social Drivers of Health     Financial Resource Strain: Not on file   Food Insecurity: Not on file   Transportation Needs: Not on file   Physical Activity: Not on file   Stress: Not on file   Social Connections: Not on file   Intimate Partner Violence: Not on file   Housing Stability: Not on file       Jaron Enriquez MD  Director, [facility] [place]   Department of Orthopaedic Surgery  [facility]  [id number] [place] [address] [place].  [place] [phone]

## 2024-11-04 NOTE — LETTER
November 4, 2024     Michaela Arthur MD  [id number]  [facility], [address]  [facility] [address]    Patient: Suly Vance   YOB: 1975   Date of Visit: 11/4/2024       Dear Dr. Michaela Arthur MD:    Thank you for referring Suly Vanec to me for evaluation. Below are my notes for this consultation.  If you have questions, please do not hesitate to call me. I look forward to following your patient along with you.       Sincerely,     Jaron Enriquez MD      CC: Lázaro Cowan MD  ______________________________________________________________________________________    49-year-old female with chief complaint of lower back pain and bilateral leg radicular pain.  The left leg is more affected than the right.  Symptoms are made worse with standing and walking, improves somewhat with rest though she does have some burning in the leg at rest.    Since his symptoms started a couple of months ago she has not had any formal treatment.  She is ready to start aquatic physical therapy and has an active prescription and she will be starting in the near future.    She has a remote history of L4-5 laminectomy and fusion with TLIF for similar symptoms in 2007.  This was done in [place].  She had good results with surgery.    She has had injections in the past, nothing recently.    She is otherwise in fairly good health.  Denies bowel or bladder incontinence.    She smokes about a half a pack of cigarettes a day.    On exam she is well-appearing and in no distress.  She walks with a forward leaning gait using a rollator walker.  No focal neurologic deficits in the legs.    I personally reviewed MRI of her lumbar spine.  This demonstrates well-healed fusion at L4-5.  At L3-4 there is moderate central and lateral recess stenosis with disc space collapse.    49-year-old female with junctional lumbar spinal stenosis.  We discussed the fact that she has not really had any conservative  treatment since the symptoms really started bothering her a few months ago.  I did encourage her to complete a course of physical therapy.    She is also working with Dr. Cowan, I think it is worthwhile to try 1 injection as well.    If she does not improve with conservative measures she would be a good surgical candidate, she absolutely needs to cease all nicotine use prior to surgery.  She is going to work on starting quitting at this point.    She can follow-up with me as needed if she does not improve to discuss surgical treatment at greater length.  We did discuss it briefly today, she would be a good candidate for lateral lumbar fusion.    *This note was dictated using speech recognition software and was not corrected for spelling or grammatical errors*    Past Medical History:   Diagnosis Date   • GERD (gastroesophageal reflux disease)          Current Outpatient Medications:   •  chlorzoxazone (Parafon Forte) 500 mg tablet, Take 1 tablet (500 mg) by mouth 4 times a day as needed for muscle spasms., Disp: 120 tablet, Rfl: 11  •  cholecalciferol (Vitamin D3) 50 MCG (2000 UT) tablet, Take 1 tablet (2,000 Units) by mouth once daily., Disp: 90 tablet, Rfl: 3  •  gabapentin (Neurontin) 600 mg tablet, Take 1 tablet (600 mg) by mouth 4 times a day., Disp: 270 tablet, Rfl: 3  •  naloxone (Narcan) 4 mg/0.1 mL nasal spray, Administer 1 spray (4 mg) into affected nostril(s) if needed for opioid reversal. May repeat every 2-3 minutes if needed, alternating nostrils, until medical assistance becomes available., Disp: 2 each, Rfl: 0  •  pantoprazole (ProtoNix) 40 mg EC tablet, TAKE 1 TABLET BY MOUTH EVERY DAY, Disp: 30 tablet, Rfl: 11  •  SUMAtriptan (Imitrex) 100 mg tablet, Take 1 tablet (100 mg) by mouth 1 time if needed for migraine. AT LEAST 2 HOURS BETWEEN DOSES AS NEEDED FOR HEADACHE, Disp: 10 tablet, Rfl: 11  •  traZODone (Desyrel) 100 mg tablet, Take 2 tablets (200 mg) by mouth once daily at bedtime., Disp: 60  tablet, Rfl: 11  •  baclofen (Lioresal) 20 mg tablet, Take 1 tablet (20 mg) by mouth 3 times a day. (Patient not taking: Reported on 10/30/2024), Disp: 90 tablet, Rfl: 11  •  venlafaxine XR (Effexor-XR) 75 mg 24 hr capsule, Take 1 capsule (75 mg) by mouth once daily. Take with food., Disp: 30 capsule, Rfl: 11     Social History     Socioeconomic History   • Marital status:      Spouse name: Not on file   • Number of children: Not on file   • Years of education: Not on file   • Highest education level: Not on file   Occupational History   • Not on file   Tobacco Use   • Smoking status: Every Day     Current packs/day: 0.50     Types: Cigarettes     Passive exposure: Current   • Smokeless tobacco: Never   Vaping Use   • Vaping status: Some Days   • Substances: THC   • Devices: Pre-filled or refillable cartridge   Substance and Sexual Activity   • Alcohol use: Not Currently   • Drug use: Yes     Types: Marijuana     Comment: occasionally   • Sexual activity: Yes   Other Topics Concern   • Not on file   Social History Narrative   • Not on file     Social Drivers of Health     Financial Resource Strain: Not on file   Food Insecurity: Not on file   Transportation Needs: Not on file   Physical Activity: Not on file   Stress: Not on file   Social Connections: Not on file   Intimate Partner Violence: Not on file   Housing Stability: Not on file       Jaron Enriquez MD  Director, [facility] [place]   Department of Orthopaedic Surgery  [facility] [place]  [address] [place] [address]  [phone]

## 2024-11-13 ENCOUNTER — APPOINTMENT (OUTPATIENT)
Dept: PRIMARY CARE | Facility: CLINIC | Age: 49
End: 2024-11-13
Payer: COMMERCIAL

## 2024-11-15 ENCOUNTER — APPOINTMENT (OUTPATIENT)
Dept: PHYSICAL THERAPY | Facility: CLINIC | Age: 49
End: 2024-11-15
Payer: COMMERCIAL

## 2024-11-22 ENCOUNTER — DOCUMENTATION (OUTPATIENT)
Dept: PHYSICAL THERAPY | Facility: CLINIC | Age: 49
End: 2024-11-22
Payer: COMMERCIAL

## 2024-11-22 NOTE — PROGRESS NOTES
Physical Therapy                 Therapy Communication Note    Patient Name: Suly Vance  MRN: [id number]  Department:   Room: Room/bed info not found  Today's Date: 11/22/2024     Discipline: Physical Therapy    Missed Time: No Show

## 2024-12-02 DIAGNOSIS — M79.18 MYOFASCIAL PAIN: ICD-10-CM

## 2024-12-02 RX ORDER — GABAPENTIN 600 MG/1
TABLET ORAL
Qty: 270 TABLET | Refills: 3 | Status: SHIPPED | OUTPATIENT
Start: 2024-12-02

## 2024-12-08 ENCOUNTER — OFFICE VISIT (OUTPATIENT)
Dept: URGENT CARE | Age: 49
End: 2024-12-08
Payer: COMMERCIAL

## 2024-12-08 VITALS
BODY MASS INDEX: 19.82 KG/M2 | OXYGEN SATURATION: 94 % | SYSTOLIC BLOOD PRESSURE: 127 MMHG | HEART RATE: 95 BPM | HEIGHT: 60 IN | RESPIRATION RATE: 18 BRPM | WEIGHT: 100.97 LBS | TEMPERATURE: 98.7 F | DIASTOLIC BLOOD PRESSURE: 78 MMHG

## 2024-12-08 DIAGNOSIS — J18.9 PNEUMONIA OF RIGHT LUNG DUE TO INFECTIOUS ORGANISM, UNSPECIFIED PART OF LUNG: Primary | ICD-10-CM

## 2024-12-08 PROCEDURE — 3078F DIAST BP <80 MM HG: CPT | Performed by: NURSE PRACTITIONER

## 2024-12-08 PROCEDURE — 3074F SYST BP LT 130 MM HG: CPT | Performed by: NURSE PRACTITIONER

## 2024-12-08 PROCEDURE — 99213 OFFICE O/P EST LOW 20 MIN: CPT | Performed by: NURSE PRACTITIONER

## 2024-12-08 PROCEDURE — 3008F BODY MASS INDEX DOCD: CPT | Performed by: NURSE PRACTITIONER

## 2024-12-08 RX ORDER — AMOXICILLIN AND CLAVULANATE POTASSIUM 875; 125 MG/1; MG/1
1 TABLET, FILM COATED ORAL 2 TIMES DAILY
Qty: 10 TABLET | Refills: 0 | Status: SHIPPED | OUTPATIENT
Start: 2024-12-08 | End: 2024-12-13

## 2024-12-08 RX ORDER — ALBUTEROL SULFATE 90 UG/1
2 INHALANT RESPIRATORY (INHALATION) EVERY 6 HOURS PRN
Qty: 18 G | Refills: 0 | Status: SHIPPED | OUTPATIENT
Start: 2024-12-08 | End: 2025-12-08

## 2024-12-08 RX ORDER — BENZONATATE 200 MG/1
200 CAPSULE ORAL 3 TIMES DAILY PRN
Qty: 20 CAPSULE | Refills: 0 | Status: SHIPPED | OUTPATIENT
Start: 2024-12-08 | End: 2025-01-07

## 2024-12-08 RX ORDER — AZITHROMYCIN 250 MG/1
TABLET, FILM COATED ORAL
Qty: 6 TABLET | Refills: 0 | Status: SHIPPED | OUTPATIENT
Start: 2024-12-08 | End: 2024-12-13

## 2024-12-08 ASSESSMENT — ENCOUNTER SYMPTOMS
FATIGUE: 1
FEVER: 1
RHINORRHEA: 1
SORE THROAT: 1
CHILLS: 1
WHEEZING: 1
COUGH: 1

## 2024-12-08 NOTE — PROGRESS NOTES
Subjective   Patient ID: Suly Vance is a 49 y.o. female. They present today with a chief complaint of Nasal Congestion (Wheezing, sore throat, fever x 3 weeks).    History of Present Illness  Suly Vance is a 49 y.o. female who presents to the clinic for 2 weeks of headache, fever, cough, sore throat, wheezing, rhinorrhea and postnasal drip.  Patient endorses feeling more fatigued and rundown. Pt smokes cigarettes.  Pt denies any chest pain, sob, N/V at this time in clinic.             Past Medical History  Allergies as of 2024 - Reviewed 2024   Allergen Reaction Noted    Pregabalin Other 10/09/2023    Duloxetine Other and Tinnitus 2024    Pseudoephedrine Runny nose 2018    Topiramate Other 10/09/2023    Zofran [ondansetron hcl] Headache 10/27/2023    Nicotine Rash 10/09/2023       (Not in a hospital admission)       Past Medical History:   Diagnosis Date    GERD (gastroesophageal reflux disease)        Past Surgical History:   Procedure Laterality Date    OTHER SURGICAL HISTORY  2022     section    OTHER SURGICAL HISTORY  2022    Scar revision    OTHER SURGICAL HISTORY  2022    Back surgery    OTHER SURGICAL HISTORY  2022    Tonsillectomy        reports that she has been smoking cigarettes. She has been exposed to tobacco smoke. She has never used smokeless tobacco. She reports that she does not currently use alcohol. She reports current drug use. Drug: Marijuana.    Review of Systems  Review of Systems   Constitutional:  Positive for chills, fatigue and fever.   HENT:  Positive for congestion, rhinorrhea and sore throat.    Respiratory:  Positive for cough and wheezing.    All other systems reviewed and are negative.                                 Objective    Vitals:    24 1024   BP: 127/78   BP Location: Left arm   Patient Position: Sitting   BP Cuff Size: Adult   Pulse: 95   Resp: 18   Temp: 37.1 °C (98.7 °F)   SpO2: 94%   Weight: 45.8 kg (100  lb 15.5 oz)   Height: 1.524 m (5')     No LMP recorded.    Physical Exam  Constitutional:       Appearance: Normal appearance.   HENT:      Right Ear: Tympanic membrane normal.      Left Ear: Tympanic membrane normal.      Mouth/Throat:      Mouth: Mucous membranes are moist.      Pharynx: Posterior oropharyngeal erythema and postnasal drip present.   Eyes:      Extraocular Movements: Extraocular movements intact.      Conjunctiva/sclera: Conjunctivae normal.      Pupils: Pupils are equal, round, and reactive to light.   Pulmonary:      Breath sounds: Wheezing present.      Comments: Upper lobes- wheeziing  Lower right- rhonchi  Neurological:      Mental Status: She is alert.   Psychiatric:         Mood and Affect: Mood normal.         Behavior: Behavior normal.         Procedures    Point of Care Test & Imaging Results from this visit  No results found for this visit on 12/08/24.   No results found.    Diagnostic study results (if any) were reviewed by DEA Hamlin-CNP.    Assessment/Plan   Allergies, medications, history, and pertinent labs/EKGs/Imaging reviewed by DEA Hamlin-CNP.     Medical Decision Making  Signs, symptoms, examination, and my interpretation of XRAY imaging consistent with community acquired pneumonia. No evidence of sepsis or acute respiratory distress at this time. Will treat with appropriate antibiotics for age group/risk factors. Patient is advised if symptoms change or worsen go to ED for further evaluation and care. Otherwise follow-up with family doctor for recheck within 5-7 days     Orders and Diagnoses  Diagnoses and all orders for this visit:  Pneumonia of right lung due to infectious organism, unspecified part of lung  -     amoxicillin-pot clavulanate (Augmentin) 875-125 mg tablet; Take 1 tablet by mouth 2 times a day for 5 days.  -     azithromycin (Zithromax) 250 mg tablet; Take 2 tabs (500 mg) by mouth today, than 1 daily for 4 days.  -     benzonatate  (Tessalon) 200 mg capsule; Take 1 capsule (200 mg) by mouth 3 times a day as needed for cough. Do not crush or chew.  -     albuterol 90 mcg/actuation inhaler; Inhale 2 puffs every 6 hours if needed for wheezing.      Medical Admin Record      Patient disposition: Home    Electronically signed by Enrique Lynne, DEA-CNP  11:52 AM

## 2024-12-08 NOTE — PATIENT INSTRUCTIONS
Augmentin for 5 days  Azithromycin as prescribed  Tessalon Perle as needed for cough   albuterol as needed for wheezing   please take over-the-counter probiotic tablets  Follow up with PCP in 5-7 days  If worsening symptoms please go to the emergency room    Please follow up with your primary provider within one week if symptoms do not improve.  You may schedule an appointment online at [email or web address] or call [phone]. Go to the Emergency Department if symptoms significantly worsen or if you develop chest pain or shortness of breath.

## 2024-12-27 ENCOUNTER — APPOINTMENT (OUTPATIENT)
Dept: PRIMARY CARE | Facility: CLINIC | Age: 49
End: 2024-12-27
Payer: COMMERCIAL

## 2025-01-03 ENCOUNTER — APPOINTMENT (OUTPATIENT)
Dept: PRIMARY CARE | Facility: CLINIC | Age: 50
End: 2025-01-03
Payer: COMMERCIAL

## 2025-01-14 ENCOUNTER — OFFICE VISIT (OUTPATIENT)
Dept: PRIMARY CARE | Facility: CLINIC | Age: 50
End: 2025-01-14
Payer: COMMERCIAL

## 2025-01-14 VITALS
SYSTOLIC BLOOD PRESSURE: 110 MMHG | DIASTOLIC BLOOD PRESSURE: 80 MMHG | BODY MASS INDEX: 20.51 KG/M2 | HEART RATE: 92 BPM | WEIGHT: 105 LBS | OXYGEN SATURATION: 98 %

## 2025-01-14 DIAGNOSIS — M96.1 POST LAMINECTOMY SYNDROME: ICD-10-CM

## 2025-01-14 DIAGNOSIS — M54.42 CHRONIC BILATERAL LOW BACK PAIN WITH BILATERAL SCIATICA: Primary | ICD-10-CM

## 2025-01-14 DIAGNOSIS — I10 PRIMARY HYPERTENSION: ICD-10-CM

## 2025-01-14 DIAGNOSIS — F51.01 PRIMARY INSOMNIA: ICD-10-CM

## 2025-01-14 DIAGNOSIS — G89.29 CHRONIC BILATERAL LOW BACK PAIN WITH BILATERAL SCIATICA: Primary | ICD-10-CM

## 2025-01-14 DIAGNOSIS — G43.009 MIGRAINE WITHOUT AURA AND WITHOUT STATUS MIGRAINOSUS, NOT INTRACTABLE: ICD-10-CM

## 2025-01-14 DIAGNOSIS — Z12.31 ENCOUNTER FOR SCREENING MAMMOGRAM FOR BREAST CANCER: ICD-10-CM

## 2025-01-14 DIAGNOSIS — F41.8 DEPRESSION WITH ANXIETY: ICD-10-CM

## 2025-01-14 DIAGNOSIS — F17.200 TOBACCO USE DISORDER: ICD-10-CM

## 2025-01-14 DIAGNOSIS — E78.2 MIXED HYPERLIPIDEMIA: ICD-10-CM

## 2025-01-14 DIAGNOSIS — E55.9 VITAMIN D DEFICIENCY: ICD-10-CM

## 2025-01-14 DIAGNOSIS — M54.41 CHRONIC BILATERAL LOW BACK PAIN WITH BILATERAL SCIATICA: Primary | ICD-10-CM

## 2025-01-14 PROCEDURE — 4004F PT TOBACCO SCREEN RCVD TLK: CPT | Performed by: INTERNAL MEDICINE

## 2025-01-14 PROCEDURE — 99214 OFFICE O/P EST MOD 30 MIN: CPT | Performed by: INTERNAL MEDICINE

## 2025-01-14 PROCEDURE — 3079F DIAST BP 80-89 MM HG: CPT | Performed by: INTERNAL MEDICINE

## 2025-01-14 PROCEDURE — 3074F SYST BP LT 130 MM HG: CPT | Performed by: INTERNAL MEDICINE

## 2025-01-14 RX ORDER — VENLAFAXINE HYDROCHLORIDE 150 MG/1
150 CAPSULE, EXTENDED RELEASE ORAL DAILY
Qty: 30 CAPSULE | Refills: 11 | Status: SHIPPED | OUTPATIENT
Start: 2025-01-14 | End: 2026-01-14

## 2025-01-14 ASSESSMENT — ENCOUNTER SYMPTOMS
LOSS OF SENSATION IN FEET: 0
ARTHRALGIAS: 1
CARDIOVASCULAR NEGATIVE: 1
DYSURIA: 0
OCCASIONAL FEELINGS OF UNSTEADINESS: 0
CONSTIPATION: 0
BACK PAIN: 1
SHORTNESS OF BREATH: 0
DEPRESSION: 0
ACTIVITY CHANGE: 0
MYALGIAS: 1
NERVOUS/ANXIOUS: 1
CONSTITUTIONAL NEGATIVE: 1
ABDOMINAL PAIN: 0
NECK PAIN: 1
COUGH: 0
DIARRHEA: 0

## 2025-01-14 ASSESSMENT — PAIN SCALES - GENERAL: PAINLEVEL_OUTOF10: 6

## 2025-01-14 NOTE — PROGRESS NOTES
Subjective   Patient ID: Suly Vance is a 49 y.o. female who presents for 3 month follow up.    Hypertension-off meds and good . BP Readings from Last 3 Encounters:  01/14/25 : 110/80  12/08/24 : 127/78  10/09/24 : 134/78     Hyperlipidemia-on no meds.  Lab Results       Component                Value               Date                       LDLCALC                  111                 11/22/2022               Depression with anxiety-taking effexor but always with stress and anxiety--Dad now with dx dementia and having anger issues.  Now having panic attacks related to all the stress.    Chronic pain due to lumbar radiculopathy and post-laminectomy syndrome-no longer seeing pain management. Uses  medical marijuana.  Having pain and numbness bilat down legs and into groin--miserable despite the medical marijuana. Using rollator now because if doesn't will fall because legs go out.  Back and weakness is worse with caring for dad-has to bathe him, change colostomy bag    Migraines-worse with stress-usually 1-2/week-imitrex helps    Dysphagia/GERD-fairly stable on protonix    Tobacco use--continues to smoke -worse with stress-about 1/2 ppd    Insomnia-using trazadone helps but wakes from pain    Vitamin D def on supplements    Surgeon has ordered water therapy and injections before can do surgery           Review of Systems   Constitutional: Negative.  Negative for activity change.   HENT:          Has new dentures   Respiratory:  Negative for cough and shortness of breath.    Cardiovascular: Negative.    Gastrointestinal:  Negative for abdominal pain, constipation and diarrhea.   Genitourinary:  Negative for dysuria.   Musculoskeletal:  Positive for arthralgias, back pain, gait problem, myalgias and neck pain.   Skin:  Negative for rash.   Psychiatric/Behavioral:  The patient is nervous/anxious.        Objective   /80 (BP Location: Left arm, Patient Position: Sitting)   Pulse 92   Wt 47.6 kg (105 lb)   SpO2  98%   BMI 20.51 kg/m²     Physical Exam  Constitutional:       General: She is not in acute distress.     Appearance: Normal appearance.   Cardiovascular:      Rate and Rhythm: Normal rate and regular rhythm.      Heart sounds: Normal heart sounds. No murmur heard.     No gallop.   Pulmonary:      Breath sounds: Normal breath sounds. No wheezing, rhonchi or rales.   Musculoskeletal:      Comments: Limited strength left leg   Skin:     General: Skin is warm and dry.      Findings: No rash.      Comments: tatoos   Neurological:      Mental Status: She is alert and oriented to person, place, and time.      Motor: Weakness: left leg.      Deep Tendon Reflexes: Reflexes abnormal.   Psychiatric:      Comments: Frustrated and near tears         Assessment/Plan will increase effexor ; add labs; discussed need to have help with her Dad due to her physical limits  Diagnoses and all orders for this visit:  Chronic bilateral low back pain with bilateral sciatica  Post laminectomy syndrome  Primary hypertension  -     Comprehensive Metabolic Panel; Future  Mixed hyperlipidemia  -     Lipid Panel; Future  -     TSH with reflex to Free T4 if abnormal; Future  Vitamin D deficiency  Depression with anxiety  -     venlafaxine XR (Effexor-XR) 150 mg 24 hr capsule; Take 1 capsule (150 mg) by mouth once daily. Take with food.  Migraine without aura and without status migrainosus, not intractable  Tobacco use disorder  Primary insomnia  Encounter for screening mammogram for breast cancer  -     BI mammo bilateral screening tomosynthesis; Future      Current Outpatient Medications:     albuterol 90 mcg/actuation inhaler, Inhale 2 puffs every 6 hours if needed for wheezing., Disp: 18 g, Rfl: 0    chlorzoxazone (Parafon Forte) 500 mg tablet, Take 1 tablet (500 mg) by mouth 4 times a day as needed for muscle spasms., Disp: 120 tablet, Rfl: 11    cholecalciferol (Vitamin D3) 50 MCG (2000 UT) tablet, Take 1 tablet (2,000 Units) by mouth once  daily., Disp: 90 tablet, Rfl: 3    gabapentin (Neurontin) 600 mg tablet, TAKE 1 TABLET(600 MG) BY MOUTH THREE TIMES DAILY, Disp: 270 tablet, Rfl: 3    naloxone (Narcan) 4 mg/0.1 mL nasal spray, Administer 1 spray (4 mg) into affected nostril(s) if needed for opioid reversal. May repeat every 2-3 minutes if needed, alternating nostrils, until medical assistance becomes available., Disp: 2 each, Rfl: 0    pantoprazole (ProtoNix) 40 mg EC tablet, TAKE 1 TABLET BY MOUTH EVERY DAY, Disp: 30 tablet, Rfl: 11    SUMAtriptan (Imitrex) 100 mg tablet, Take 1 tablet (100 mg) by mouth 1 time if needed for migraine. AT LEAST 2 HOURS BETWEEN DOSES AS NEEDED FOR HEADACHE, Disp: 10 tablet, Rfl: 11    traZODone (Desyrel) 100 mg tablet, TAKE 2 TABLETS(200 MG) BY MOUTH EVERY DAY AT BEDTIME, Disp: 60 tablet, Rfl: 11    venlafaxine XR (Effexor-XR) 150 mg 24 hr capsule, Take 1 capsule (150 mg) by mouth once daily. Take with food., Disp: 30 capsule, Rfl: 11

## 2025-02-06 ENCOUNTER — OFFICE VISIT (OUTPATIENT)
Dept: PAIN MEDICINE | Facility: CLINIC | Age: 50
End: 2025-02-06
Payer: COMMERCIAL

## 2025-02-06 VITALS
HEART RATE: 86 BPM | WEIGHT: 105 LBS | HEIGHT: 60 IN | BODY MASS INDEX: 20.62 KG/M2 | RESPIRATION RATE: 18 BRPM | OXYGEN SATURATION: 97 % | SYSTOLIC BLOOD PRESSURE: 132 MMHG | DIASTOLIC BLOOD PRESSURE: 82 MMHG

## 2025-02-06 DIAGNOSIS — M79.18 MYOFASCIAL PAIN: ICD-10-CM

## 2025-02-06 DIAGNOSIS — M96.1 POSTLAMINECTOMY SYNDROME OF LUMBAR REGION: Primary | ICD-10-CM

## 2025-02-06 DIAGNOSIS — M47.816 LUMBAR FACET ARTHROPATHY: ICD-10-CM

## 2025-02-06 DIAGNOSIS — M54.51 VERTEBROGENIC LOW BACK PAIN: ICD-10-CM

## 2025-02-06 PROCEDURE — 99214 OFFICE O/P EST MOD 30 MIN: CPT | Performed by: STUDENT IN AN ORGANIZED HEALTH CARE EDUCATION/TRAINING PROGRAM

## 2025-02-06 PROCEDURE — 3075F SYST BP GE 130 - 139MM HG: CPT | Performed by: STUDENT IN AN ORGANIZED HEALTH CARE EDUCATION/TRAINING PROGRAM

## 2025-02-06 PROCEDURE — 3079F DIAST BP 80-89 MM HG: CPT | Performed by: STUDENT IN AN ORGANIZED HEALTH CARE EDUCATION/TRAINING PROGRAM

## 2025-02-06 PROCEDURE — G2211 COMPLEX E/M VISIT ADD ON: HCPCS | Performed by: STUDENT IN AN ORGANIZED HEALTH CARE EDUCATION/TRAINING PROGRAM

## 2025-02-06 PROCEDURE — 3008F BODY MASS INDEX DOCD: CPT | Performed by: STUDENT IN AN ORGANIZED HEALTH CARE EDUCATION/TRAINING PROGRAM

## 2025-02-06 PROCEDURE — 4004F PT TOBACCO SCREEN RCVD TLK: CPT | Performed by: STUDENT IN AN ORGANIZED HEALTH CARE EDUCATION/TRAINING PROGRAM

## 2025-02-06 ASSESSMENT — PAIN DESCRIPTION - DESCRIPTORS: DESCRIPTORS: BURNING;SHOOTING

## 2025-02-06 ASSESSMENT — PAIN SCALES - GENERAL
PAINLEVEL_OUTOF10: 6
PAINLEVEL_OUTOF10: 6

## 2025-02-06 ASSESSMENT — PAIN - FUNCTIONAL ASSESSMENT: PAIN_FUNCTIONAL_ASSESSMENT: 0-10

## 2025-02-06 NOTE — PROGRESS NOTES
"[facility]  Follow Up Office Visit Note 2025    Patient Information: Suly Vance, MRN: [id number], : 1975   Primary Care/Referring Physician: Michaela Arthur MD, [address] [facility], [address] [place] [address] / [place]*     Chief Complaint: Low back and left leg pain    Interval History: Ms. Vance presents today for follow up. At her last visit I referred her for surgical evaluation    Today she reports doing worse since last seen. She is having more burning, stinging pain in her legs.  She was evaluated by Dr. Enriquez, who recommended additional conservative measures such as physical therapy and injections prior to considering surgery.  She would like to move forward with the injections we previously discussed.  She is scheduled to start physical therapy in about a week.    Brief History of Pain: Ms. Suly Vance is a 49 y.o. female with a PMHx of HTN, HLD, depression, anxiety, migraines, ?seizure disorder who presents for low back and left leg pain.    She reports history of a \"low back fracture\" in  after moving heavy furniture, which required decompression and posterior instrumented fusion around  or . She reports gradually progressive pain since her surgery. She describes midline pain near her lumbosacral junction which radiates down her left lateral leg to the level of the ankle. She gets left anterior thigh and knee numbness, and left lateral calf numbness. Denies any major issues in her right leg. Her pain worsens with prolonged sitting, which particularly bothers her tailbone, and prolonged standing. Her pain improves when she is walking.     Current Pain Medications: Medical THC, Gabapentin 600 mg QID, Chlorzoxazone 500 mg QID  Previously Tried Pain Medications: Tizanidine, Morphine, Percocet, Pregabalin, Duloxetine - caused mood changes. Tramadol 100 mg q6h PRN. Avoids NSAID's due to history of GI ulcer    Relevant Surgeries: Hx of L4/5 decompression/fusion w/ " "artificial disc  Injections: Underwent steroid injections in the past with only very short term pain relief  Physical/Occupational Therapy: Has done PT in the past with no improvement.     Medications:   Current Outpatient Medications   Medication Instructions    albuterol 90 mcg/actuation inhaler 2 puffs, inhalation, Every 6 hours PRN    chlorzoxazone (PARAFON FORTE) 500 mg, oral, 4 times daily PRN    cholecalciferol (VITAMIN D3) 2,000 Units, oral, Daily    gabapentin (Neurontin) 600 mg tablet TAKE 1 TABLET(600 MG) BY MOUTH THREE TIMES DAILY    naloxone (NARCAN) 4 mg, nasal, As needed, May repeat every 2-3 minutes if needed, alternating nostrils, until medical assistance becomes available.    pantoprazole (PROTONIX) 40 mg, oral, Daily    SUMAtriptan (IMITREX) 100 mg, oral, Once as needed, AT LEAST 2 HOURS BETWEEN DOSES AS NEEDED FOR HEADACHE    traZODone (Desyrel) 100 mg tablet TAKE 2 TABLETS(200 MG) BY MOUTH EVERY DAY AT BEDTIME    venlafaxine XR (EFFEXOR-XR) 150 mg, oral, Daily, Take with food.      Allergies:   Allergies   Allergen Reactions    Pregabalin Other    Duloxetine Other and Tinnitus     \"Moppy\"    Pseudoephedrine Runny nose    Topiramate Other    Zofran [Ondansetron Hcl] Headache    Nicotine Rash    Nsaids (Non-Steroidal Anti-Inflammatory Drug) GI Upset and Unknown       Past Medical & Surgical History:  Past Medical History:   Diagnosis Date    GERD (gastroesophageal reflux disease)     Headache       Past Surgical History:   Procedure Laterality Date    OTHER SURGICAL HISTORY  2022     section    OTHER SURGICAL HISTORY  2022    Scar revision    OTHER SURGICAL HISTORY  2022    Back surgery    OTHER SURGICAL HISTORY  2022    Tonsillectomy       Family History   Problem Relation Name Age of Onset    Cancer Mother      Stroke Mother      Hypertension Mother      Cervical cancer Mother      Mental illness Mother      Hypertension Father      Cancer Sister      Cervical " cancer Sister      Heart disease Maternal Grandmother      Cancer Paternal Grandmother      Breast cancer Paternal Grandmother          metastatic    Parkinsonism Paternal Grandfather       Social History     Socioeconomic History    Marital status:      Spouse name: Not on file    Number of children: Not on file    Years of education: Not on file    Highest education level: Not on file   Occupational History    Not on file   Tobacco Use    Smoking status: Every Day     Current packs/day: 0.50     Types: Cigarettes     Passive exposure: Current    Smokeless tobacco: Never   Vaping Use    Vaping status: Some Days    Substances: THC    Devices: Pre-filled or refillable cartridge   Substance and Sexual Activity    Alcohol use: Not Currently    Drug use: Yes     Types: Marijuana     Comment: occasionally    Sexual activity: Yes   Other Topics Concern    Not on file   Social History Narrative    Not on file     Social Drivers of Health     Financial Resource Strain: Not on file   Food Insecurity: Not on file   Transportation Needs: Not on file   Physical Activity: Not on file   Stress: Not on file   Social Connections: Not on file   Intimate Partner Violence: Not on file   Housing Stability: Not on file       Problems, Past medical history, past surgical history, Medications, allergies, social and family history reviewed and as per the electronic medical record from today's encounter    Review of Systems:  CONST: No fever, chills, fatigue, weight changes  EYES: No loss of vision  ENT: No hearing loss, tinnitus  CV: No chest pain, palpitations  RESP: No dyspnea, shortness of breath, cough  GI: No stool incontinence, nausea, vomiting  : No urinary incontinence  MSK: No joint swelling  SKIN: No rash, no hives  NEURO: No headache, dizziness  PSYCH: No anxiety, depression  HEM/LYMPH: No easy bruising or bleeding  All other systems reviewed are negative     Physical Exam:  Vitals: /82   Pulse 86   Resp 18    "Ht 1.524 m (5')   Wt 47.6 kg (105 lb)   SpO2 97%   BMI 20.51 kg/m²   General: No apparent distress. Alert, appropriate, oriented x 3. Mood generally positive, affect congruent. Speaking in full sentences.   HENT: Normocephalic, atraumatic. Hearing intact.  Eyes: Pupils equal and round  Neck: Supple, trachea midline  Lungs: Symmetric respiratory excursion on visual exam, nonlabored breathing.   Extremities: No cyanosis or edema noted in extremities.  Skin: No rashes, lesions noted.  Neuro: Alert and appropriate. Walking with a cane. Bulk and tone within normal limits.    Laboratory Data:  The following laboratory data were reviewed during this visit:   Lab Results   Component Value Date    WBC 7.2 06/19/2023    RBC 4.42 06/19/2023    HGB 13.2 06/19/2023    HCT 39.9 06/19/2023     06/19/2023      No results found for: \"INR\"  Lab Results   Component Value Date    CREATININE 0.7 06/19/2023       Imaging:  The following imaging impressions were reviewed by me during this visit:    -10/24/24 lumbar spine MRI shows L1/L2 circumferential bulging intervertebral disc. Bilateral facet  hypertrophy. No measurable canal stenosis. Moderate/advanced bilateral foraminal narrowing. L2/L3 moderate bilateral foraminal narrowing. L3/L4 bulging intervertebral disc and bilateral facet hypertrophy. Moderate/advanced bilateral foraminal narrowing. Modic type 2 discogenic marrow signal changes in the adjacent endplates. No measurable canal stenosis. L4/L5 previous laminectomy and pedicle screw and plate fixation. Normal alignment with interbody spacer or fusion. No  measurable canal stenosis or foraminal narrowing. L5/S1 Bilateral facet hypertrophy without canal or foraminal narrowing.    I also personally reviewed the images from the above studies myself. These images and my interpretation of them contributed to the management and decision making of the patient's medical plan.    ASSESSMENT:  Ms. Suly Vance is a 49 y.o. female " with low back and left leg pain that is consistent with:    1. Postlaminectomy syndrome of lumbar region    2. Vertebrogenic low back pain    3. Lumbar facet arthropathy    4. Myofascial pain          PLAN:    Diagnostics:   - I reviewed her recent lumbar spine MRI (see above for details). This does show progression of degenerative changes superior to her fusion, most prominently bilateral severe foraminal stenosis at L3/4.  No additional diagnostics at this time    Physical Therapy and Rehabilitation:     - She is planning to start PT in 1 week    Psychologically:  - No needs at this time    Medications  - I did have a long discussion with her about medication options today. I told her that I would be willing to take over prescription of her Tramadol, but she would no longer be able to use medical THC per  policy. Additionally, my plan would be to taper down her Tramadol dose and trial other non-opioid medications along with interventional treatment to better control her pain. She states that she will have to think about this  - Continue Gabapentin 600 mg QID.    Duration  - Multiple years    Interventions:  - I suspect her pain is multi-factorial secondary to lumbar post-laminectomy syndrome, lumbar spondylosis, and myofascial pain.  Today I did recommend trial of bilateral L3/4 transforaminal RENAN's utilizing live fluoroscopy and IV sedation. Risks, benefits, alternatives discussed. She would like to proceed.  I previously discussed the possibility of spinal cord stimulation, but she does not like the idea of having a wire in her body.  Continue following with Dr. Enriquez regarding need for surgery moving forward      Sincerely,  Lázaro Cowan MD  [facility] [place]

## 2025-02-10 NOTE — PROGRESS NOTES
Physical Therapy Evaluation    Patient Name: Suly Vance  MRN: [id number]  Evaluation Date: 2/11/2025  Time Calculation  Start Time: 0930  Stop Time: 1015  Time Calculation (min): 45 min  PT Evaluation Time Entry  PT Evaluation (Moderate) Time Entry: 25     PT Therapeutic Procedures Time Entry  Therapeutic Exercise Time Entry: 12     Insurance Information: 11/14/2024:  NO AUTH, 30.00 CO PAY, 100% COVERAGE, 60V PT/OT/ST, 3000 OOP- NOT MET, ANTHEM   Problem List Items Addressed This Visit             ICD-10-CM    Spinal stenosis of lumbar region at multiple levels M48.061    Post laminectomy syndrome - Primary M96.1         Subjective   General: Patient is a 50 yo male with diagnosis of lumbar radiculopathy/post laminectomy syndrome.  Patient s/p lumbar fusion 2007.  Patient's symptoms worsened over summer of 2024 Patient with chief complaint of bilateral LBP with radiculopathy bilateral LE.  Patient struggles walking > 100 yards.Patient to spine surgeon who advised PT/injections prior to considering surgery- injection 2/21. 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       MRI L-spine  Moderate/advanced bilateral foraminal narrowing. Modic type 2   discogenic marrow signal changes in the adjacent endplates. No   measurable canal stenosis. L4/L5   Previous laminectomy  and pedicle screw and plate fixation. Normal   alignment with interbody spacer or fusion. Marrow signal changes in   the adjacent endplates consistent with previous surgery. No   measurable canal stenosis or foraminal narrowing. L5/S1   Bilateral facet hypertrophy without canal or foraminal narrowing.        * Postoperative changes as described   *No measurable change compared to the previous exam.       Objective   Posture:  flexed trunk     Range of Motion:  Lumbar ROM Range   Flexion 50% reduced LBP   Extension 50% reduced LBP   R rotation 25% reduced LBP   L rotation 50% reduced LBP   R sidebend 50% reduced   L sidebend 50% reduced         Strength:  Hip MMT L R   Hip Flexion 3+/5 4-/5   Hip Extension 4/5 4/5   Hip Abduction 4-/5 4/5   Hip Adduction 4-/5 4/5        Flexibility:  ++HS  ++FRANCESCO left > right     Palpation:  ++tenderness left >>right LPS/glute       Special Tests:     Gait:  Guarded WB left  Flexed trunk posture  Limited trunk rotation   Uses cane/rolator at times       Transfers:  Guarded        Outcome Measures:  Oswestry  42/50    Assessment  Pt is a 49 y.o. female who presents with impairments of LBP/core weakness. These impairments have led to functional limitations including difficulty with ADLs/recreation. Pt would benefit from skilled physical therapy intervention to improve above impairments and facilitate return to function.    Complexity of Evaluation: Moderate    Based on the history including personal factors and/or comorbidities, examination of body systems including body structures and function, activity limitations, and/or participation restrictions, as well as clinical presentation, patient meets criteria for above complexity evaluation.    Plan  1-2x/week  Up to 10 visits  Therapeutic exercise  Manual  aquatics       Insurance Plan: Payor: ANTONIO / Plan: ANTONIO HMP / Product Type: *No Product type* /     Plan for next visit:   Initiate aquatics    OP EDUCATION:  HEP       Today's  Treatment:  Orientation to aquatics    Goals:  STG (3 visits)  Patient to be tolerate 30 minutes of aquatics    LTG (10 visit)   Oswestry to <50 % impaired   Patient to perform ADLs with pain < /10   Patient to walk for 300 yards without pain.   MMT bilateral hip mm 4/6

## 2025-02-11 ENCOUNTER — EVALUATION (OUTPATIENT)
Dept: PHYSICAL THERAPY | Facility: CLINIC | Age: 50
End: 2025-02-11
Payer: COMMERCIAL

## 2025-02-11 DIAGNOSIS — M96.1 POST LAMINECTOMY SYNDROME: Primary | ICD-10-CM

## 2025-02-11 DIAGNOSIS — M48.061 SPINAL STENOSIS OF LUMBAR REGION AT MULTIPLE LEVELS: ICD-10-CM

## 2025-02-17 NOTE — PROGRESS NOTES
Physical Therapy Treatment    Patient Name: Suly Vance  MRN: [id number]  Encounter date:  2/18/2025  Time Calculation  Start Time: 1230  Stop Time: 1315  Time Calculation (min): 45 min     PT Therapeutic Procedures Time Entry  Aquatic Therapy Time Entry: 35       Visit Number:  1 (including evaluation)  Planned total visits: 10  Visits Authorized/Insurance Coverage:  02/10/2025:  NO AUTH, 30.00 CO PAY, 100% COVERAGE, 60V PT/OT/ST, 3000 OOP- NOT MET, ANTHEM     Plan for next visit:   Initiate aquatics    Current Problem  Problem List Items Addressed This Visit             ICD-10-CM    Post laminectomy syndrome M96.1       Precautions:   Fusion, no DN     Relevant PMH:  HTN  Laminectomy/fusion  depression  OA     Pain   5/10 left thigh posterior and anterior groin    Subjective  General    First follow up since eval     Objective  Difficulty WB through LLE     Treatment:  Aquatic Therapy: Renny S/M belt next session for water walking  The patient is comfortable with water.    Independent, no device used    bilateral handrail assist and step-over-step     Water walking for ROM and pain reduction: lateral and fwd , 2 LAPS - unable retro even with noodle     Standing core and LE strengthening exercises:  Heel raises - unable to Wb through LLE     LLE only with BUE   SLR, x 10   Abduction, x 10      MIP x 5     Deep end for core, decompression, pain reduction: Noddle under arms   Hang x 1.5' - began to hurt   Ankle pumps L felt in glut, R good   Bicycle x 30 sec - painful  Hip ABD/ADD x 1' - better but not comfortable  Hang x 30 sec - began to hurt     Attempted straddle but too painful pelvic girdle  Sling with white noodle but patient unable to control core. Sm red noodle better core control     Small red noodle sling Decompress   1'  - painful - 4 ft +    S/M belt donned 4.5 ft   Decompress  2' - better but not pain free  Ankle pumps x 10 - better but not pain free  Hip abd and add x 10 - better but not pain  "free  Decompress  1' - better but not pain free  Marching x 10 -    Current HEP:  To be developed    Has patient been compliant with HEP?  N/A       OP EDUCATION:       Assessment:   Limited WB LLE in shallow end during walking and exercise. Trail of noodle in various positions and then belt donned. Hanging best tolerated with belt. Trail belt in shallow end and deep end to assess response.   Pt's response to treatment:  Faor     Limitations/deficits:  Tolerance to movement and WB LLE due to groin and radicular symptoms.     Pain end of session: \"same\"     Plan:     Continue with current POC/no changes Trail belt in shallow end and deep end to assess response.   1-2x/week  Up to 10 visits  Therapeutic exercise  Manual  aquatics    Assessment of current progress against goals:  Insufficient treatment time to assess progress      Goals:  STG (3 visits)  Patient to be tolerate 30 minutes of aquatics     LTG (10 visit)   Oswestry to <50 % impaired   Patient to perform ADLs with pain < /10   Patient to walk for 300 yards without pain.   MMT bilateral hip mm 4/6     "

## 2025-02-18 ENCOUNTER — TREATMENT (OUTPATIENT)
Dept: PHYSICAL THERAPY | Facility: CLINIC | Age: 50
End: 2025-02-18
Payer: COMMERCIAL

## 2025-02-18 DIAGNOSIS — M96.1 POST LAMINECTOMY SYNDROME: ICD-10-CM

## 2025-02-18 PROCEDURE — 97113 AQUATIC THERAPY/EXERCISES: CPT | Mod: GP,CQ

## 2025-02-21 ENCOUNTER — ANCILLARY PROCEDURE (OUTPATIENT)
Dept: RADIOLOGY | Facility: EXTERNAL LOCATION | Age: 50
End: 2025-02-21
Payer: COMMERCIAL

## 2025-02-21 DIAGNOSIS — M96.1 POSTLAMINECTOMY SYNDROME, NOT ELSEWHERE CLASSIFIED: ICD-10-CM

## 2025-02-21 PROCEDURE — 64483 NJX AA&/STRD TFRM EPI L/S 1: CPT | Performed by: STUDENT IN AN ORGANIZED HEALTH CARE EDUCATION/TRAINING PROGRAM

## 2025-02-21 NOTE — PROGRESS NOTES
Procedure Note: 2025    PROCEDURE NAME: Bilateral transforaminal epidural steroid injection at L3/4    Patient Information: Suly Vance MRN (from [facility]) [id number], : 1975  Chief Complaint: Low back and leg pain    Pain Diagnosis: The diagnosis of Postlaminectomy syndrome, not elsewhere classified  has been made given the patient's clinical evaluation at prior evaluation.      DESCRIPTION OF PROCEDURE:    Suly Vance was brought to the procedure room. The assistant obtained vital signs, which were found to be stable. She was then informed of the procedure, its benefits, and its risks, and her questions were answered regarding the procedure. Written informed consent was obtained from the patient. Immediately prior to starting the procedure, a time-out safety check was conducted and the patient's identification, procedure name, and procedure site were confirmed with the patient.    Bilateral L3/4 Transforaminal Epidural Steroid Injection  After informed written consent was obtained, the patient was placed in prone position with a pillow under the abdomen to reduce lumbar lordosis. Monitoring of pulse oximetry, heart rate, and blood pressure was done pre-procedure, and post-procedure. During the procedure the patient was monitored with continuous pulse-ox. A time out was performed before the beginning of the procedure. The correct site and laterality were marked. The skin was prepped with chlorhexidine, allowed to dry for a minimum of 3 minutes, and draped in a sterile fashion     The L3/4 vertebral level was identified with use of fluoroscopy in AP and oblique views. The fluoroscope was obliqued to the right to visualize the neuroforamen and the target in the superior foramen just lateral the inferior articular process was identified and marked. The skin and subcutaneous tissue over this site was anesthetized using 3 ml of 1% lidocaine with a 1.5 inch 25G needle. A 3.5 inch 22G spinal needle was then slowly  advanced co-axially until the tip just entered the foramen. This was then repeated on the left side at the same level in a similar fashion. Needle position was confirmed using AP and lateral views as it entered the neuroforamen in a supraneural approach. After negative aspiration for blood or CSF, 1 ml of Omnipaque was injected under live fluoroscopy, with contrast found to spread medially into the epidural space. Next, 1 mL of a mixture of 15 mg dexamethasone diluted in 2.5 mL of 1% lidocaine was injected incrementally on the left, and 2 mL of this mixture was injected on the right. She had some discomfort in her leg on the left sided injection, thus slightly less medication was injected on that side. The stylet was replaced and the needle was removed      Anesthesia: local anesthesia, IV sedation  Complications: none      Lázaro Cowan MD

## 2025-02-27 ENCOUNTER — TELEPHONE (OUTPATIENT)
Dept: PAIN MEDICINE | Facility: CLINIC | Age: 50
End: 2025-02-27
Payer: COMMERCIAL

## 2025-02-27 NOTE — TELEPHONE ENCOUNTER
Worse in what way? Need some more details. Is she having increase back pain or leg pain?    I know the procedure itself was fairly painful for her and she might be having some residual procedural pain. She should take Tylenol 1000 mg three times a day and use heat on the area we did the procedure.     It can take up to 10 days for the steroid to kick in, so she should give it a little more time and then update me next week with how she is feeling

## 2025-02-27 NOTE — TELEPHONE ENCOUNTER
Pt calling stating she had and injection on 02/21/2025( LTR L3-L4) Pt states she is feeling worse than before getting the injection. Pt asking what should she do?

## 2025-02-27 NOTE — TELEPHONE ENCOUNTER
Spoke with the Pt she states her back and legs hurt. She is taking Tylenol 1000 mg three times a day and using heat on the area you did the procedure on. Pt said she will give it more time and call us next week.

## 2025-02-28 ENCOUNTER — TREATMENT (OUTPATIENT)
Dept: PHYSICAL THERAPY | Facility: CLINIC | Age: 50
End: 2025-02-28
Payer: COMMERCIAL

## 2025-02-28 DIAGNOSIS — M96.1 POST LAMINECTOMY SYNDROME: Primary | ICD-10-CM

## 2025-02-28 DIAGNOSIS — M48.061 SPINAL STENOSIS OF LUMBAR REGION AT MULTIPLE LEVELS: ICD-10-CM

## 2025-02-28 PROCEDURE — 97113 AQUATIC THERAPY/EXERCISES: CPT | Mod: GP

## 2025-02-28 NOTE — PROGRESS NOTES
"    Physical Therapy Treatment    Patient Name: Suly Vance  MRN: [id number]  Encounter date:  2/28/2025  Time Calculation  Start Time: 0820  Stop Time: 0845  Time Calculation (min): 25 min     PT Therapeutic Procedures Time Entry  Aquatic Therapy Time Entry: 24       Visit Number: 3  Planned total visits: 10  Visits Authorized/Insurance Coverage:  02/10/2025:  NO AUTH, 30.00 CO PAY, 100% COVERAGE, 60V PT/OT/ST, 3000 OOP- NOT MET, ANTHEM     Current Problem  Problem List Items Addressed This Visit             ICD-10-CM    Spinal stenosis of lumbar region at multiple levels M48.061    Post laminectomy syndrome - Primary M96.1       Precautions:  Fusion, no DN     Relevant PMH:  HTN  Laminectomy/fusion  depression  OA       Pain  LB/left LE (groin  4-5/10    Subjective  General Patient s/p injection per pain management 2/21.  Patient with initial increase in symptoms- physician aware.  Patient informed by physician relief my take up to 10 days  post injection.      Objective  Guarded WB left  Guarded trunk ROM      Treatment:  Aquatic Therapy:   The patient is comfortable with water.   Independent, no device used    Swim belt donned S-M     bilateral handrail assist and step-over-step      Water walking for ROM and pain reduction: FWD only BKWD and SS painful     Standing core and LE strengthening exercises:     Right  LE only secondary to pain with left LE WB- with BUE support- TDWB left in 3'9\"      MIP x 10     Deep end for core, decompression, pain reduction with swim belt   Hang x 1'  Bicycle x 30 sec - pain left LE  Hip ABD/ADD x 1' - pain left LE  Hang x 2- pain left LE        Current HEP:  Aquatics only    Has patient been compliant with HEP?  NA    Activity tolerance:  Fair- left LE pain    OP EDUCATION:  Pace activity    Assessment:  Pt's response to treatment:  fair- \"it felt worse than before injection\"  Areas of improvements:  ease of mobility in water with belt  Limitations/deficits:  LBP/left LE " pain    Pain end of session:   4-5/10- LBP/left groin    Plan:  Continue with current POC with the following changes advance as able    Assessment of current progress against goals:  Patient with no specific improvements; modest increase in symptoms.      Goals:  STG (3 visits)  Patient to be tolerate 30 minutes of aquatics     LTG (10 visit)   Oswestry to <50 % impaired   Patient to perform ADLs with pain < /10   Patient to walk for 300 yards without pain.   MMT bilateral hip mm 4/5

## 2025-03-04 ENCOUNTER — TELEPHONE (OUTPATIENT)
Dept: PAIN MEDICINE | Facility: CLINIC | Age: 50
End: 2025-03-04
Payer: COMMERCIAL

## 2025-03-04 ENCOUNTER — DOCUMENTATION (OUTPATIENT)
Dept: PHYSICAL THERAPY | Facility: CLINIC | Age: 50
End: 2025-03-04
Payer: COMMERCIAL

## 2025-03-04 DIAGNOSIS — M79.18 MYOFASCIAL PAIN: Primary | ICD-10-CM

## 2025-03-04 RX ORDER — CYCLOBENZAPRINE HCL 5 MG
5-10 TABLET ORAL 2 TIMES DAILY PRN
Qty: 60 TABLET | Refills: 0 | Status: SHIPPED | OUTPATIENT
Start: 2025-03-04 | End: 2025-03-19

## 2025-03-04 NOTE — TELEPHONE ENCOUNTER
T calling stating she had an BL LTR done on 2/21/2025. Pt states she still having spasm and pain is getting worse. Pt is scheduled for follow up 3/20/2025.

## 2025-03-04 NOTE — PROGRESS NOTES
Physical Therapy                 Therapy Communication Note    Patient Name: Suly Vance  MRN: [id number]  Department:   Room: Room/bed info not found  Today's Date: 3/4/2025     Discipline: Physical Therapy      Missed Time: No Show

## 2025-03-04 NOTE — TELEPHONE ENCOUNTER
I will send in a prescription for Cyclobenzaprine to try for the next few weeks to see if it helps with the spasms. She should not use her Chlorzoxazone while taking this medication

## 2025-03-07 ENCOUNTER — DOCUMENTATION (OUTPATIENT)
Dept: PHYSICAL THERAPY | Facility: CLINIC | Age: 50
End: 2025-03-07
Payer: COMMERCIAL

## 2025-03-07 NOTE — PROGRESS NOTES
Physical Therapy                 Therapy Communication Note    Patient Name: Suly Vance  MRN: [id number]  Department:   Room: Room/bed info not found  Today's Date: 3/7/2025     Discipline: Physical Therapy    Missed Time: Cancel

## 2025-03-11 ENCOUNTER — DOCUMENTATION (OUTPATIENT)
Dept: PHYSICAL THERAPY | Facility: CLINIC | Age: 50
End: 2025-03-11
Payer: COMMERCIAL

## 2025-03-11 NOTE — PROGRESS NOTES
Physical Therapy                 Therapy Communication Note    Patient Name: Suly Vance  MRN: [id number]  Department:   Room: Room/bed info not found  Today's Date: 3/11/2025     Discipline: Physical Therapy    Missed Time: No Show    Comment:

## 2025-03-14 ENCOUNTER — DOCUMENTATION (OUTPATIENT)
Dept: PHYSICAL THERAPY | Facility: CLINIC | Age: 50
End: 2025-03-14
Payer: COMMERCIAL

## 2025-03-14 NOTE — PROGRESS NOTES
Physical Therapy                 Therapy Communication Note    Patient Name: Suly Vance  MRN: [id number]  Department:   Room: Room/bed info not found  Today's Date: 3/14/2025     Discipline: Physical Therapy    Missed Time: No Show    Comment:

## 2025-03-17 ENCOUNTER — HOSPITAL ENCOUNTER (OUTPATIENT)
Facility: HOSPITAL | Age: 50
Setting detail: OUTPATIENT SURGERY
End: 2025-03-17
Attending: ORTHOPAEDIC SURGERY | Admitting: ORTHOPAEDIC SURGERY
Payer: COMMERCIAL

## 2025-03-17 ENCOUNTER — OFFICE VISIT (OUTPATIENT)
Dept: ORTHOPEDIC SURGERY | Facility: CLINIC | Age: 50
End: 2025-03-17
Payer: COMMERCIAL

## 2025-03-17 VITALS — WEIGHT: 105 LBS | BODY MASS INDEX: 20.62 KG/M2 | HEIGHT: 60 IN

## 2025-03-17 DIAGNOSIS — M48.062 NEUROGENIC CLAUDICATION DUE TO LUMBAR SPINAL STENOSIS: Primary | ICD-10-CM

## 2025-03-17 DIAGNOSIS — M54.16 LUMBAR RADICULOPATHY: ICD-10-CM

## 2025-03-17 DIAGNOSIS — Z01.818 PRE-OP TESTING: Primary | ICD-10-CM

## 2025-03-17 PROCEDURE — 4004F PT TOBACCO SCREEN RCVD TLK: CPT | Performed by: ORTHOPAEDIC SURGERY

## 2025-03-17 PROCEDURE — 3008F BODY MASS INDEX DOCD: CPT | Performed by: ORTHOPAEDIC SURGERY

## 2025-03-17 PROCEDURE — 99214 OFFICE O/P EST MOD 30 MIN: CPT | Performed by: ORTHOPAEDIC SURGERY

## 2025-03-17 RX ORDER — CEFAZOLIN SODIUM 2 G/100ML
2 INJECTION, SOLUTION INTRAVENOUS ONCE
OUTPATIENT
Start: 2025-03-17 | End: 2025-03-17

## 2025-03-17 RX ORDER — ACETAMINOPHEN 325 MG/1
975 TABLET ORAL ONCE
OUTPATIENT
Start: 2025-03-17 | End: 2025-03-17

## 2025-03-17 RX ORDER — GABAPENTIN 300 MG/1
600 CAPSULE ORAL ONCE
OUTPATIENT
Start: 2025-03-17 | End: 2025-03-17

## 2025-03-17 ASSESSMENT — PAIN - FUNCTIONAL ASSESSMENT: PAIN_FUNCTIONAL_ASSESSMENT: 0-10

## 2025-03-17 ASSESSMENT — PAIN DESCRIPTION - DESCRIPTORS: DESCRIPTORS: SHARP;SHOOTING;SORE

## 2025-03-17 ASSESSMENT — PAIN SCALES - GENERAL: PAINLEVEL_OUTOF10: 4

## 2025-03-17 NOTE — PROGRESS NOTES
Patient returns for follow-up for junctional lumbar spinal stenosis.  She has severe lateral recess and left-sided foraminal stenosis.  She has left-sided L3 radiculopathy.    Since her last visit she has completed a course of aquatic physical therapy and had an epidural injection.  These did not help her symptoms at all.  In fact, physical therapy seem to worsen the symptoms.    She is working on quitting smoking, has committed to quitting nicotine completely and has cut back dramatically from where she was at her last visit.  She understands that she has to quit completely to optimize surgical results.  She would like to proceed with surgery as soon as possible.    I discussed the risks of surgery including bleeding, infection, paralysis, muscle weakness, CSF leak, bowel or bladder dysfunction, incomplete resolution of pain or numbness, DVT/PE, heart attack, stroke, and other unforeseen medical and anesthesia complications.  I also explained that the typical success rates for operation such as this fall in the 80-85% range, and that there is a small chance that there will be no improvement, or even less commonly, worsening of the preoperative symptoms.  She verbalized understanding of the risks, benefits, and alternatives to surgical treatment.  The plan will be for L3-4 lateral lumbar fusion with lateral instrumentation.  Surgery was scheduled for April 10 at [facility].    Surgical Codes:  91739 L3-4  86654 Nuvasive Coroent cage  80279 Nuvasive Decade  20930 Globus Ossifuse (100% bone product)    *This note was dictated using speech recognition software and was not corrected for spelling or grammatical errors*

## 2025-03-19 NOTE — PROGRESS NOTES
"[facility]  Follow Up Office Visit Note 3/20/2025    Patient Information: Suly Vance, MRN: [id number], : 1975   Primary Care/Referring Physician: Michaela Arthur MD, [address] [facility], [address] / [place]*     Chief Complaint: Low back and left leg pain    Interval History: Ms. Vance presents today for follow up after bilateral L3/4 TFESI.     Today she reports ***    At her last visit I referred her for surgical evaluation    Today she reports doing worse since last seen. She is having more burning, stinging pain in her legs.  She was evaluated by Dr. Enriquez, who recommended additional conservative measures such as physical therapy and injections prior to considering surgery.  She would like to move forward with the injections we previously discussed.  She is scheduled to start physical therapy in about a week.    Brief History of Pain: Ms. Suly Vance is a 49 y.o. female with a PMHx of HTN, HLD, depression, anxiety, migraines, ?seizure disorder who presents for low back and left leg pain.    She reports history of a \"low back fracture\" in  after moving heavy furniture, which required decompression and posterior instrumented fusion around  or . She reports gradually progressive pain since her surgery. She describes midline pain near her lumbosacral junction which radiates down her left lateral leg to the level of the ankle. She gets left anterior thigh and knee numbness, and left lateral calf numbness. Denies any major issues in her right leg. Her pain worsens with prolonged sitting, which particularly bothers her tailbone, and prolonged standing. Her pain improves when she is walking.     Current Pain Medications: Medical THC, Gabapentin 600 mg QID, Chlorzoxazone 500 mg QID  Previously Tried Pain Medications: Tizanidine, Morphine, Percocet, Pregabalin, Duloxetine - caused mood changes. Tramadol 100 mg q6h PRN. Avoids NSAID's due to history of GI " "ulcer    Relevant Surgeries: Hx of L4/5 decompression/fusion w/ artificial disc  Injections: Underwent steroid injections in the past with only very short term pain relief  Physical/Occupational Therapy: Has done PT in the past with no improvement.     Medications:   Current Outpatient Medications   Medication Instructions    albuterol 90 mcg/actuation inhaler 2 puffs, inhalation, Every 6 hours PRN    chlorzoxazone (PARAFON FORTE) 500 mg, oral, 4 times daily PRN    cholecalciferol (VITAMIN D3) 2,000 Units, oral, Daily    cyclobenzaprine (FLEXERIL) 5-10 mg, oral, 2 times daily PRN    gabapentin (Neurontin) 600 mg tablet TAKE 1 TABLET(600 MG) BY MOUTH THREE TIMES DAILY    naloxone (NARCAN) 4 mg, nasal, As needed, May repeat every 2-3 minutes if needed, alternating nostrils, until medical assistance becomes available.    pantoprazole (PROTONIX) 40 mg, oral, Daily    SUMAtriptan (IMITREX) 100 mg, oral, Once as needed, AT LEAST 2 HOURS BETWEEN DOSES AS NEEDED FOR HEADACHE    traZODone (Desyrel) 100 mg tablet TAKE 2 TABLETS(200 MG) BY MOUTH EVERY DAY AT BEDTIME    venlafaxine XR (EFFEXOR-XR) 150 mg, oral, Daily, Take with food.      Allergies:   Allergies   Allergen Reactions    Pregabalin Other    Duloxetine Other and Tinnitus     \"Moppy\"    Pseudoephedrine Runny nose    Topiramate Other    Zofran [Ondansetron Hcl] Headache    Nicotine Rash    Nsaids (Non-Steroidal Anti-Inflammatory Drug) GI Upset and Unknown       Past Medical & Surgical History:  Past Medical History:   Diagnosis Date    GERD (gastroesophageal reflux disease)     Headache       Past Surgical History:   Procedure Laterality Date    OTHER SURGICAL HISTORY  2022     section    OTHER SURGICAL HISTORY  2022    Scar revision    OTHER SURGICAL HISTORY  2022    Back surgery    OTHER SURGICAL HISTORY  2022    Tonsillectomy       Family History   Problem Relation Name Age of Onset    Cancer Mother      Stroke Mother      " Hypertension Mother      Cervical cancer Mother      Mental illness Mother      Hypertension Father      Cancer Sister      Cervical cancer Sister      Heart disease Maternal Grandmother      Cancer Paternal Grandmother      Breast cancer Paternal Grandmother          metastatic    Parkinsonism Paternal Grandfather       Social History     Socioeconomic History    Marital status:      Spouse name: Not on file    Number of children: Not on file    Years of education: Not on file    Highest education level: Not on file   Occupational History    Not on file   Tobacco Use    Smoking status: Every Day     Current packs/day: 0.50     Types: Cigarettes     Passive exposure: Current    Smokeless tobacco: Never   Vaping Use    Vaping status: Some Days    Substances: THC    Devices: Pre-filled or refillable cartridge   Substance and Sexual Activity    Alcohol use: Not Currently    Drug use: Yes     Types: Marijuana     Comment: occasionally    Sexual activity: Yes   Other Topics Concern    Not on file   Social History Narrative    Not on file     Social Drivers of Health     Financial Resource Strain: Not on file   Food Insecurity: Not on file   Transportation Needs: Not on file   Physical Activity: Not on file   Stress: Not on file   Social Connections: Not on file   Intimate Partner Violence: Not on file   Housing Stability: Not on file       Problems, Past medical history, past surgical history, Medications, allergies, social and family history reviewed and as per the electronic medical record from today's encounter    Review of Systems:  CONST: No fever, chills, fatigue, weight changes  EYES: No loss of vision  ENT: No hearing loss, tinnitus  CV: No chest pain, palpitations  RESP: No dyspnea, shortness of breath, cough  GI: No stool incontinence, nausea, vomiting  : No urinary incontinence  MSK: No joint swelling  SKIN: No rash, no hives  NEURO: No headache, dizziness  PSYCH: No anxiety, depression  HEM/LYMPH: No  "easy bruising or bleeding  All other systems reviewed are negative     Physical Exam:  Vitals: There were no vitals taken for this visit.  General: No apparent distress. Alert, appropriate, oriented x 3. Mood generally positive, affect congruent. Speaking in full sentences.   HENT: Normocephalic, atraumatic. Hearing intact.  Eyes: Pupils equal and round  Neck: Supple, trachea midline  Lungs: Symmetric respiratory excursion on visual exam, nonlabored breathing.   Extremities: No cyanosis or edema noted in extremities.  Skin: No rashes, lesions noted.  Neuro: Alert and appropriate. Walking with a cane. Bulk and tone within normal limits.    Laboratory Data:  The following laboratory data were reviewed during this visit:   Lab Results   Component Value Date    WBC 7.2 06/19/2023    RBC 4.42 06/19/2023    HGB 13.2 06/19/2023    HCT 39.9 06/19/2023     06/19/2023      No results found for: \"INR\"  Lab Results   Component Value Date    CREATININE 0.7 06/19/2023       Imaging:  The following imaging impressions were reviewed by me during this visit:    -10/24/24 lumbar spine MRI shows L1/L2 circumferential bulging intervertebral disc. Bilateral facet  hypertrophy. No measurable canal stenosis. Moderate/advanced bilateral foraminal narrowing. L2/L3 moderate bilateral foraminal narrowing. L3/L4 bulging intervertebral disc and bilateral facet hypertrophy. Moderate/advanced bilateral foraminal narrowing. Modic type 2 discogenic marrow signal changes in the adjacent endplates. No measurable canal stenosis. L4/L5 previous laminectomy and pedicle screw and plate fixation. Normal alignment with interbody spacer or fusion. No  measurable canal stenosis or foraminal narrowing. L5/S1 Bilateral facet hypertrophy without canal or foraminal narrowing.    I also personally reviewed the images from the above studies myself. These images and my interpretation of them contributed to the management and decision making of the patient's " medical plan.    ASSESSMENT:  Ms. Suly Vance is a 49 y.o. female with low back and left leg pain that is consistent with:    No diagnosis found.        PLAN:    Diagnostics:   - I reviewed her recent lumbar spine MRI (see above for details). This does show progression of degenerative changes superior to her fusion, most prominently bilateral severe foraminal stenosis at L3/4.  No additional diagnostics at this time    Physical Therapy and Rehabilitation:     - She is planning to start PT in 1 week    Psychologically:  - No needs at this time    Medications  - I did have a long discussion with her about medication options today. I told her that I would be willing to take over prescription of her Tramadol, but she would no longer be able to use medical THC per  policy. Additionally, my plan would be to taper down her Tramadol dose and trial other non-opioid medications along with interventional treatment to better control her pain. She states that she will have to think about this  - Continue Gabapentin 600 mg QID.    Duration  - Multiple years    Interventions:  - I suspect her pain is multi-factorial secondary to lumbar post-laminectomy syndrome, lumbar spondylosis, and myofascial pain.  Today I did recommend trial of bilateral L3/4 transforaminal RENAN's utilizing live fluoroscopy and IV sedation. Risks, benefits, alternatives discussed. She would like to proceed.  I previously discussed the possibility of spinal cord stimulation, but she does not like the idea of having a wire in her body.  Continue following with Dr. Enriquez regarding need for surgery moving forward      Sincerely,  Lázaro Cowan MD  [facility] [place]

## 2025-03-20 ENCOUNTER — APPOINTMENT (OUTPATIENT)
Dept: PAIN MEDICINE | Facility: CLINIC | Age: 50
End: 2025-03-20
Payer: COMMERCIAL

## 2025-03-20 ENCOUNTER — APPOINTMENT (OUTPATIENT)
Dept: LAB | Facility: HOSPITAL | Age: 50
End: 2025-03-20
Payer: COMMERCIAL

## 2025-03-20 LAB
ABO GROUP (TYPE) IN BLOOD: NORMAL
ALBUMIN SERPL-MCNC: 4.1 G/DL (ref 3.6–5.1)
ALP SERPL-CCNC: 49 U/L (ref 31–125)
ALT SERPL-CCNC: 16 U/L (ref 6–29)
ANION GAP SERPL CALCULATED.3IONS-SCNC: 9 MMOL/L (ref 10–20)
ANION GAP SERPL CALCULATED.4IONS-SCNC: 11 MMOL/L (CALC) (ref 7–17)
ANTIBODY SCREEN: NORMAL
APTT PPP: 29.1 SECONDS (ref 22–32.5)
AST SERPL-CCNC: 21 U/L (ref 10–35)
BASOPHILS # BLD AUTO: 0.04 X10*3/UL (ref 0–0.1)
BASOPHILS NFR BLD AUTO: 0.5 %
BILIRUB SERPL-MCNC: 0.3 MG/DL (ref 0.2–1.2)
BUN SERPL-MCNC: 14 MG/DL (ref 6–23)
BUN SERPL-MCNC: 15 MG/DL (ref 7–25)
CALCIUM SERPL-MCNC: 8.7 MG/DL (ref 8.6–10.3)
CALCIUM SERPL-MCNC: 8.8 MG/DL (ref 8.6–10.2)
CHLORIDE SERPL-SCNC: 102 MMOL/L (ref 98–107)
CHLORIDE SERPL-SCNC: 102 MMOL/L (ref 98–110)
CHOLEST SERPL-MCNC: 143 MG/DL
CHOLEST/HDLC SERPL: 4.2 (CALC)
CO2 SERPL-SCNC: 26 MMOL/L (ref 20–32)
CO2 SERPL-SCNC: 30 MMOL/L (ref 21–32)
CREAT SERPL-MCNC: 0.75 MG/DL (ref 0.5–1.05)
CREAT SERPL-MCNC: 0.79 MG/DL (ref 0.5–0.99)
EGFRCR SERPLBLD CKD-EPI 2021: 93 ML/MIN/1.73M2
EGFRCR SERPLBLD CKD-EPI 2021: >90 ML/MIN/1.73M*2
EOSINOPHIL # BLD AUTO: 0.38 X10*3/UL (ref 0–0.7)
EOSINOPHIL NFR BLD AUTO: 4.6 %
ERYTHROCYTE [DISTWIDTH] IN BLOOD BY AUTOMATED COUNT: 12.7 % (ref 11.5–14.5)
GLUCOSE SERPL-MCNC: 73 MG/DL (ref 65–99)
GLUCOSE SERPL-MCNC: 74 MG/DL (ref 74–99)
HCT VFR BLD AUTO: 38.8 % (ref 36–46)
HDLC SERPL-MCNC: 34 MG/DL
HGB BLD-MCNC: 13.4 G/DL (ref 12–16)
IMM GRANULOCYTES # BLD AUTO: 0.02 X10*3/UL (ref 0–0.7)
IMM GRANULOCYTES NFR BLD AUTO: 0.2 % (ref 0–0.9)
INR PPP: 1 (ref 0.9–1.2)
LDLC SERPL CALC-MCNC: 85 MG/DL (CALC)
LYMPHOCYTES # BLD AUTO: 2.53 X10*3/UL (ref 1.2–4.8)
LYMPHOCYTES NFR BLD AUTO: 30.4 %
MCH RBC QN AUTO: 31.5 PG (ref 26–34)
MCHC RBC AUTO-ENTMCNC: 34.5 G/DL (ref 32–36)
MCV RBC AUTO: 91 FL (ref 80–100)
MONOCYTES # BLD AUTO: 0.51 X10*3/UL (ref 0.1–1)
MONOCYTES NFR BLD AUTO: 6.1 %
NEUTROPHILS # BLD AUTO: 4.84 X10*3/UL (ref 1.2–7.7)
NEUTROPHILS NFR BLD AUTO: 58.2 %
NONHDLC SERPL-MCNC: 109 MG/DL (CALC)
NRBC BLD-RTO: 0 /100 WBCS (ref 0–0)
PLATELET # BLD AUTO: 280 X10*3/UL (ref 150–450)
POTASSIUM SERPL-SCNC: 4.3 MMOL/L (ref 3.5–5.3)
POTASSIUM SERPL-SCNC: 4.6 MMOL/L (ref 3.5–5.3)
PROT SERPL-MCNC: 6.3 G/DL (ref 6.1–8.1)
PROTHROMBIN TIME: 10.7 SECONDS (ref 9.3–12.7)
RBC # BLD AUTO: 4.25 X10*6/UL (ref 4–5.2)
RH FACTOR (ANTIGEN D): NORMAL
SODIUM SERPL-SCNC: 137 MMOL/L (ref 136–145)
SODIUM SERPL-SCNC: 139 MMOL/L (ref 135–146)
TRIGL SERPL-MCNC: 138 MG/DL
TSH SERPL-ACNC: 1.57 MIU/L
WBC # BLD AUTO: 8.3 X10*3/UL (ref 4.4–11.3)

## 2025-03-20 PROCEDURE — 85730 THROMBOPLASTIN TIME PARTIAL: CPT

## 2025-03-20 PROCEDURE — 85610 PROTHROMBIN TIME: CPT

## 2025-03-20 PROCEDURE — 86900 BLOOD TYPING SEROLOGIC ABO: CPT

## 2025-03-20 PROCEDURE — 85025 COMPLETE CBC W/AUTO DIFF WBC: CPT

## 2025-03-20 PROCEDURE — 86901 BLOOD TYPING SEROLOGIC RH(D): CPT

## 2025-03-20 PROCEDURE — 86850 RBC ANTIBODY SCREEN: CPT

## 2025-03-20 PROCEDURE — 80048 BASIC METABOLIC PNL TOTAL CA: CPT

## 2025-03-21 ENCOUNTER — APPOINTMENT (OUTPATIENT)
Dept: PHYSICAL THERAPY | Facility: CLINIC | Age: 50
End: 2025-03-21

## 2025-03-25 ENCOUNTER — APPOINTMENT (OUTPATIENT)
Dept: PHYSICAL THERAPY | Facility: CLINIC | Age: 50
End: 2025-03-25

## 2025-04-03 ENCOUNTER — PRE-ADMISSION TESTING (OUTPATIENT)
Dept: PREADMISSION TESTING | Facility: HOSPITAL | Age: 50
End: 2025-04-03
Payer: COMMERCIAL

## 2025-04-03 VITALS
HEART RATE: 79 BPM | OXYGEN SATURATION: 99 % | WEIGHT: 110 LBS | BODY MASS INDEX: 21.6 KG/M2 | RESPIRATION RATE: 16 BRPM | HEIGHT: 60 IN | SYSTOLIC BLOOD PRESSURE: 150 MMHG | TEMPERATURE: 98.2 F | DIASTOLIC BLOOD PRESSURE: 73 MMHG

## 2025-04-03 VITALS
RESPIRATION RATE: 16 BRPM | DIASTOLIC BLOOD PRESSURE: 73 MMHG | OXYGEN SATURATION: 99 % | HEART RATE: 79 BPM | SYSTOLIC BLOOD PRESSURE: 150 MMHG | WEIGHT: 110 LBS | BODY MASS INDEX: 21.6 KG/M2 | TEMPERATURE: 98.2 F | HEIGHT: 60 IN

## 2025-04-03 DIAGNOSIS — Z01.818 PRE-OP TESTING: Primary | ICD-10-CM

## 2025-04-03 DIAGNOSIS — M48.062 NEUROGENIC CLAUDICATION DUE TO LUMBAR SPINAL STENOSIS: ICD-10-CM

## 2025-04-03 DIAGNOSIS — M54.16 LUMBAR RADICULOPATHY: ICD-10-CM

## 2025-04-03 PROCEDURE — 93010 ELECTROCARDIOGRAM REPORT: CPT | Performed by: INTERNAL MEDICINE

## 2025-04-03 PROCEDURE — 87081 CULTURE SCREEN ONLY: CPT | Mod: TRILAB

## 2025-04-03 PROCEDURE — 93005 ELECTROCARDIOGRAM TRACING: CPT

## 2025-04-03 PROCEDURE — 99204 OFFICE O/P NEW MOD 45 MIN: CPT | Performed by: PHYSICIAN ASSISTANT

## 2025-04-03 RX ORDER — CHLORHEXIDINE GLUCONATE ORAL RINSE 1.2 MG/ML
SOLUTION DENTAL
Qty: 473 ML | Refills: 0 | Status: SHIPPED | OUTPATIENT
Start: 2025-04-03 | End: 2025-04-10

## 2025-04-03 ASSESSMENT — ENCOUNTER SYMPTOMS
GASTROINTESTINAL NEGATIVE: 1
FATIGUE: 1
RESPIRATORY NEGATIVE: 1
PSYCHIATRIC NEGATIVE: 1
CARDIOVASCULAR NEGATIVE: 1
LIGHT-HEADEDNESS: 1
EYES NEGATIVE: 1
BACK PAIN: 1
ARTHRALGIAS: 1

## 2025-04-03 NOTE — CPM/PAT H&P
"CPM/PAT Evaluation       Name: Suly Vance (Suly Vance)  /Age: 1975/49 y.o.     In-Person       Chief Complaint: \"back pain\"    HPI  The patient is a 49 year old female.  She has a long history of low back pain.  In  she underwent lumbar spine surgery and in  spinal hardware was removed.  Post operatively she did well for several years.  Several months ago she began to experience lumbar spine pain with rest and this increases in intensity with weight bearing activities.  The pain radiates down the right leg to the thigh and down the left leg to the foot.  She can also have bilateral inguinal area pain and burning.  She has associated bilateral leg weakness, instability, numbness, tingling and falls.  Her last fall was 2 months ago.  She has done aquatic therapy and has had epidural injections, but she has not improved.  She was seen by Dr. Enriquez and surgical intervention is recommended.    Past Medical History:   Diagnosis Date    Anxiety     Arthritis     Chronic pain disorder     Fractures     GERD (gastroesophageal reflux disease)     Headache     Hyperlipidemia     Hypertension     Joint pain     Lumbar disc disease     Neuromuscular disorder (Multi)     Pneumonia     Seizure (Multi) 2002    X3 - immediately post-partum - no recurrence    Spinal stenosis        Past Surgical History:   Procedure Laterality Date     SECTION, LOW TRANSVERSE      LUMBAR FUSION      LUMBAR SPINE SURGERY      OTHER SURGICAL HISTORY  2008    Removal spine hardware    SPINAL FUSION      TONSILLECTOMY       Family History   Problem Relation Name Age of Onset    Cancer Mother Emily vasu     Stroke Mother Emily vasu     Hypertension Mother Emily vasu     Cervical cancer Mother Emily vasu     Mental illness Mother [place] vasu     Arthritis Mother Emily vasu     Depression Mother Emily vasu     Hypertension Father Jasvir [place]     Arthritis Father Jasvir [place]     Diabetes Father " "Jasvir [place]     Cancer Sister      Cervical cancer Sister      Heart disease Maternal Grandmother      Cancer Paternal Grandmother Kathryn [place]     Breast cancer Paternal Grandmother Kathryn [place]         metastatic    Parkinsonism Paternal Grandfather       Social History     Tobacco Use    Smoking status: Every Day     Current packs/day: 0.50     Average packs/day: 0.5 packs/day for 35.3 years (17.6 ttl pk-yrs)     Types: Cigarettes     Start date: 1990     Passive exposure: Current    Smokeless tobacco: Never   Substance Use Topics    Alcohol use: Never     Social History     Substance and Sexual Activity   Drug Use Yes    Frequency: 7.0 times per week    Types: Marijuana    Comment: THC VAPE DAILY     Allergies   Allergen Reactions    Pregabalin Other     Unable to void    Duloxetine Other and Tinnitus     \"Moppy\"    Other Other     UV RAYS -HIVES WITH PROLONGED EXPOSURE    Pseudoephedrine Other     Bloody nose    Topiramate Other     Foggy headed    Zofran [Ondansetron Hcl] Headache    Nicotine Rash     patch    Nsaids (Non-Steroidal Anti-Inflammatory Drug) GI Upset and Unknown     No current facility-administered medications for this encounter.     Current Outpatient Medications   Medication Sig Dispense Refill    chlorhexidine (Peridex) 0.12 % solution Use as directed. 473 mL 0    chlorzoxazone (Parafon Forte) 500 mg tablet Take 1 tablet (500 mg) by mouth 4 times a day as needed for muscle spasms. 120 tablet 11    cholecalciferol (Vitamin D3) 50 MCG (2000 UT) tablet Take 1 tablet (2,000 Units) by mouth once daily. 90 tablet 3    gabapentin (Neurontin) 600 mg tablet TAKE 1 TABLET(600 MG) BY MOUTH THREE TIMES DAILY 270 tablet 3    multivit-minerals/folic acid (ADULT MULTIVITAMIN GUMMIES ORAL) Take by mouth once daily.      naloxone (Narcan) 4 mg/0.1 mL nasal spray Administer 1 spray (4 mg) into affected nostril(s) if needed for opioid reversal. May repeat every 2-3 minutes if needed, alternating " nostrils, until medical assistance becomes available. 2 each 0    pantoprazole (ProtoNix) 40 mg EC tablet TAKE 1 TABLET BY MOUTH EVERY DAY 30 tablet 11    SUMAtriptan (Imitrex) 100 mg tablet Take 1 tablet (100 mg) by mouth 1 time if needed for migraine. AT LEAST 2 HOURS BETWEEN DOSES AS NEEDED FOR HEADACHE 10 tablet 11    traZODone (Desyrel) 100 mg tablet TAKE 2 TABLETS(200 MG) BY MOUTH EVERY DAY AT BEDTIME 60 tablet 11    venlafaxine XR (Effexor-XR) 150 mg 24 hr capsule Take 1 capsule (150 mg) by mouth once daily. Take with food. 30 capsule 11     Review of Systems   Constitutional:  Positive for fatigue.   HENT: Negative.     Eyes: Negative.    Respiratory: Negative.     Cardiovascular: Negative.    Gastrointestinal: Negative.    Genitourinary: Negative.    Musculoskeletal:  Positive for arthralgias and back pain.   Neurological:  Positive for light-headedness.   Psychiatric/Behavioral: Negative.       /73   Pulse 79   Temp 36.8 °C (98.2 °F)   Resp 16   Ht 1.524 m (5')   Wt 49.9 kg (110 lb)   SpO2 99%   BMI 21.48 kg/m²     Physical Exam  Vitals reviewed.   Constitutional:       Comments: Thin appearance   HENT:      Head: Normocephalic and atraumatic.      Mouth/Throat:      Mouth: Mucous membranes are moist.      Pharynx: Oropharynx is clear.   Eyes:      Extraocular Movements: Extraocular movements intact.      Pupils: Pupils are equal, round, and reactive to light.   Cardiovascular:      Rate and Rhythm: Normal rate and regular rhythm.   Pulmonary:      Effort: Pulmonary effort is normal.      Breath sounds: Normal breath sounds.   Abdominal:      General: Bowel sounds are normal.      Palpations: Abdomen is soft.   Musculoskeletal:         General: No swelling.      Comments: Lumbar spine range of motion deferred.   Skin:     General: Skin is warm and dry.   Neurological:      Mental Status: She is alert and oriented to person, place, and time.   Psychiatric:         Mood and Affect: Mood normal.          Behavior: Behavior normal.        PAT AIRWAY:   Airway:     Mallampati::  II    TM distance::  >3 FB    Neck ROM::  Full   upper dentures and lower dentures    ASA:  II  DASI SCORE:  12.45  METS SCORE:  4.3  CHAD2 SCORE:  2.8%  REVISED CARDIAC RISK INDEX:  0.4%  STOP BANG SCORE:  0  CAPRINI DVT SCORE:  4  YKRIE SCORE:  0.06%  ARISCAT SCORE:  1.6%    CBC, BMP, PT/INR, TYPE & SCREEN done 3/20/2025   MRSA ordered during PAT visit  EKG (preliminary in PAT) - normal sinus rhythm, possible left atrial enlargement    Assessment and Plan:     Neurogenic claudication due to lumbar spinal stenosis, lumbar radiculopathy:  Lumbar 3-4 lateral lumbar fusion  Hypertension - currently no medication  Remote h/o seizure X 3 - 2002 post-partum, no recurrence  Cigarette smoker X 35 years, vapes marijuana daily    Sarah Kidd PA-C

## 2025-04-03 NOTE — PREPROCEDURE INSTRUCTIONS
Why must I stop eating and drinking near surgery time?  With sedation, food or liquid in your stomach can enter your lungs causing serious complications  Increases nausea and vomiting    When do I need to stop eating and drinking before my surgery?   Do not eat or drink after midnight the night before your surgery/procedure.  You may have small sips of water to take your medication.      PAT DISCHARGE INSTRUCTIONS    Please call the Same Day Surgery (SDS) Department of the hospital where your procedure will be performed after 2:00 PM the day before your surgery. If you are scheduled on a Monday, or a Tuesday following a Monday holiday, you will need to call on the last business day prior to your surgery.    [place] [id number] [place] [id number] [phone]  Second Floor      Please let your surgeon know if:      You develop any open sores, shingles, burning or painful urination as these may increase your risk of an infection.   You no longer wish to have the surgery.   Any other personal circumstances change that may lead to the need to cancel or defer this surgery-such as being sick or getting admitted to any hospital within one week of your planned procedure.    Your contact details change, such as a change of address or phone number.    Starting now:     Please DO NOT drink alcohol or smoke for 24 hours before surgery. It is well known that quitting smoking can make a huge difference to your health and recovery from surgery. The longer you abstain from smoking, the better your chances of a healthy recovery. If you need help with quitting, call 4-800-QUIT-NOW to be connected to a trained counselor who will discuss the best methods to help you quit.     Before your surgery:    Please stop all supplements 7 days prior to surgery. Or as directed by your surgeon.   Please stop taking NSAID pain medicine such as Advil and Motrin 7 days before surgery.    If you  develop any fever, cough, cold, rashes, cuts, scratches, scrapes, urinary symptoms or infection anywhere on your body (including teeth and gums) prior to surgery, please call your surgeon’s office as soon as possible. This may require treatment to reduce the chance of cancellation on the day of surgery.    The day before your surgery:   DIET- Please follow the diet instructions at the top of your packet.   Get a good night’s rest.  Use the special soap for bathing if you have been instructed to use one.    Scheduled surgery times may change and you will be notified if this occurs - please check your personal voicemail for any updates.     On the morning of surgery:   Wear comfortable, loose fitting clothes which open in the front. Please do not wear moisturizers, creams, lotions, makeup or perfume.    Please bring with you to surgery:   Photo ID and insurance card   Current list of medicines and allergies   Pacemaker/ Defibrillator/Heart stent cards   CPAP machine and mask    Slings/ splints/ crutches   A copy of your complete advanced directive/DHPOA.    Please do NOT bring with you to surgery:   All jewelry and valuables should be left at home.   Prosthetic devices such as contact lenses, hearing aids, dentures, eyelash extensions, hairpins and body piercings must be removed prior to going in to the surgical suite.    After outpatient surgery:   A responsible adult MUST accompany you at the time of discharge and stay with you for 24 hours after your surgery. You may NOT drive yourself home after surgery.    Do not drive, operate machinery, make critical decisions or do activities that require co-ordination or balance until after a night’s sleep.   Do not drink alcoholic beverages for 24 hours.   Instructions for resuming your medications will be provided by your surgeon.    CALL YOUR DOCTOR AFTER SURGERY IF YOU HAVE:     Chills and/or a fever of 101° F or higher.    Redness, swelling, pus or drainage from your  surgical wound or a bad smell from the wound.    Lightheadedness, fainting or confusion.    Persistent vomiting (throwing up) and are not able to eat or drink for 12 hours.    Three or more loose, watery bowel movements in 24 hours (diarrhea).   Difficulty or pain while urinating( after non-urological surgery)    Pain and swelling in your legs, especially if it is only on one side.    Difficulty breathing or are breathing faster than normal.    Any new concerning symptoms.      Patient Information: Pre-Operative Infection Prevention Measures     Why did I have my nose, under my arms, and groin swabbed?  The purpose of the swab is to identify Staphylococcus aureus inside your nose or on your skin.  The swab was sent to the laboratory for culture.  A positive swab/culture for Staphylococcus aureus is called colonization or carriage.      What is Staphylococcus aureus?  Staphylococcus aureus, also known as “staph”, is a germ found on the skin or in the nose of healthy people.  Sometimes Staphylococcus aureus can get into the body and cause an infection.  This can be minor (such as pimples, boils, or other skin problems).  It might also be serious (such as a blood infection, pneumonia, or a surgical site infection).    What is Staphylococcus aureus colonization or carriage?  Colonization or carriage means that a person has the germ but is not sick from it.  These bacteria can be spread on the hands or when breathing or sneezing.    How is Staphylococcus aureus spread?  It is most often spread by close contact with a person or item that carries it.    What happens if my culture is positive for Staphylococcus aureus?  Your doctor/medical team will use this information to guide any antibiotic treatment which may be necessary.  Regardless of the culture results, we will clean the inside of your nose with a betadine swab just before you have your surgery.      Will I get an infection if I have Staphylococcus aureus in my  nose or on my skin?  Anyone can get an infection with Staphylococcus aureus.  However, the best way to reduce your risk of infection is to follow the instructions provided to you for the use of your CHG soap and dental rinse.        Patient Information: Oral/Dental Rinse    What is oral/dental rinse?   It is a mouthwash. It is a way of cleaning the mouth with a germ-killing solution before your surgery.  The solution contains chlorhexidine, commonly known as CHG.   It is used inside the mouth to kill a bacteria known as Staphylococcus aureus.  Let your doctor know if you are allergic to Chlorhexidine.    Why do I need to use CHG oral/dental rinse?  The CHG oral/dental rinse helps to kill a bacteria in your mouth known as Staphylococcus aureus.     This reduces the risk of infection at the surgical site.      Using your CHG oral/dental rinse  STEPS:  Use your CHG oral/dental rinse after you brush your teeth the night before (at bedtime) and the morning of your surgery.  Follow all directions on your prescription label.    Use the cap on the container to measure 15ml   Swish (gargle if you can) the mouthwash in your mouth for at least 30 seconds, (do not swallow) and spit out  After you use your CHG rinse, do not rinse your mouth with water, drink or eat.  Please refer to the prescription label for the appropriate time to resume oral intake      What side effects might I have using the CHG oral/dental rinse?  CHG rinse will stick to plaque on the teeth.  Brush and floss just before use.  Teeth brushing will help avoid staining of plaque during use.      Patient Information: Home Preoperative Antibacterial Shower      What is a home preoperative antibacterial shower?  This shower is a way of cleaning the skin with a germ-killing solution before surgery.  The solution contains chlorhexidine, commonly known as CHG.  CHG is a skin cleanser with germ-killing ability.  Let your doctor know if you are allergic to  chlorhexidine.    Why do I need to take a preoperative antibacterial shower?  Skin is not sterile.  It is best to try to make your skin as free of germs as possible before surgery.  Proper cleansing with a germ-killing soap before surgery can lower the number of germs on your skin.  This helps to reduce the risk of infection at the surgical site.  Following the instructions listed below will help you prepare your skin for surgery.      How do I use the solution?  Steps:  Begin using your CHG soap 5 days before your scheduled surgery on ________________________.    First, wash and rinse your hair using the CHG soap. Keep CHG soap away from ear canals and eyes.  Rinse completely, do not condition.  Hair extensions should be removed.  Wash your face with your normal soap and rinse.    Apply the CHG solution to a clean wet washcloth.  Turn the water off or move away from the water spray to avoid premature rinsing of the CHG soap as you are applying.   Firmly lather your entire body from the neck down.  Do not use on your face.  Pay special attention to the area(s) where your incision(s) will be located unless they are on your face.  Avoid scrubbing your skin too hard.  The important point is to have the CHG soap sit on your skin for 3 minutes.    When the 3 minutes are up, turn on the water and rinse the CHG solution off your body completely.   DO NOT wash with regular soap after you have used the CHG soap solution  Pat yourself dry with a clean, freshly-laundered towel.  DO NOT apply powders, deodorants, or lotions.  Dress in clean, freshly laundered nightclothes.    Be sure to sleep with clean, freshly laundered sheets.  Be aware that CHG will cause stains on fabrics; if you wash them with bleach after use.  Rinse your washcloth and other linens that have contact with CHG completely.  Use only non-chlorine detergents to launder the items used.   The morning of surgery is the fifth day.  Repeat the above steps and  dress in clean comfortable clothing     Whom should I contact if I have any questions regarding the use of CHG soap?  Call the [facility] at [phone] if you have any questions.                  Medication List            Accurate as of April 3, 2025 12:28 PM. Always use your most recent med list.                ADULT MULTIVITAMIN GUMMIES ORAL  Additional Medication Adjustments for Surgery: Take last dose 7 days before surgery     chlorhexidine 0.12 % solution  Commonly known as: Peridex  Use as directed.  Medication Adjustments for Surgery: Take/Use as prescribed     chlorzoxazone 500 mg tablet  Commonly known as: Parafon Forte  Take 1 tablet (500 mg) by mouth 4 times a day as needed for muscle spasms.  Medication Adjustments for Surgery: Take/Use as prescribed     cholecalciferol 50 MCG (2000 UT) tablet  Commonly known as: Vitamin D3  Take 1 tablet (2,000 Units) by mouth once daily.  Additional Medication Adjustments for Surgery: Take last dose 7 days before surgery     gabapentin 600 mg tablet  Commonly known as: Neurontin  TAKE 1 TABLET(600 MG) BY MOUTH THREE TIMES DAILY  Medication Adjustments for Surgery: Take on the morning of surgery     naloxone 4 mg/0.1 mL nasal spray  Commonly known as: Narcan  Administer 1 spray (4 mg) into affected nostril(s) if needed for opioid reversal. May repeat every 2-3 minutes if needed, alternating nostrils, until medical assistance becomes available.  Medication Adjustments for Surgery: Take/Use as prescribed     pantoprazole 40 mg EC tablet  Commonly known as: ProtoNix  TAKE 1 TABLET BY MOUTH EVERY DAY  Medication Adjustments for Surgery: Take on the morning of surgery     SUMAtriptan 100 mg tablet  Commonly known as: Imitrex  Take 1 tablet (100 mg) by mouth 1 time if needed for migraine. AT LEAST 2 HOURS BETWEEN DOSES AS NEEDED FOR HEADACHE  Medication Adjustments for Surgery: Do Not take on the morning  of surgery     traZODone 100 mg tablet  Commonly known as: Desyrel  TAKE 2 TABLETS(200 MG) BY MOUTH EVERY DAY AT BEDTIME  Notes to patient: Take evening dose before surgery.     venlafaxine  mg 24 hr capsule  Commonly known as: Effexor-XR  Take 1 capsule (150 mg) by mouth once daily. Take with food.  Medication Adjustments for Surgery: Take on the morning of surgery                                [facility]    Preoperative Deep Breathing Exercises    Why it is important to do deep breathing exercises before my surgery?  Deep breathing exercises strengthen your breathing muscles.  This helps you to recover after your surgery and decreases the chance of breathing complications.      How are the deep breathing exercises done?  Sit straight with your back supported.  Breathe in deeply and slowly through your nose. Your lower rib cage should expand and your abdomen may move forward.  Hold that breath for 3 to 5 seconds.  Breathe out through pursed lips, slowly and completely.  Rest and repeat 10 times every hour while awake.  Rest longer if you become dizzy or lightheaded.      Patient Information: Incentive Spirometer  What is an incentive spirometer?  An incentive spirometer is a device used before and after surgery to “exercise” your lungs.  It helps you to take deeper breaths to expand your lungs.  Below is an example of a basic incentive spirometer.  The device you receive may differ slightly but they all function the same.    Why do I need to use an incentive spirometer?  Using your incentive spirometer prepares your lungs for surgery and helps prevent lung problems after surgery.  How do I use my incentive spirometer?  When you're using your incentive spirometer, make sure to breathe through your mouth. If you breathe through your nose, the incentive spirometer won't work properly. You can hold your nose if you have trouble.  If you feel dizzy at any time, stop and rest. Try again  at a later time.  Follow the steps below:  Set up your incentive spirometer, expand the flexible tubing and connect to the outlet.  Sit upright in a chair or bed. Hold the incentive spirometer at eye level.   Put the mouthpiece in your mouth and close your lips tightly around it. Slowly breathe out (exhale) completely.  Breathe in (inhale) slowly through your mouth as deeply as you can. As you take a breath, you will see the piston rise inside the large column. While the piston rises, the indicator should move upwards. It should stay in between the 2 arrows (see Figure).  Try to get the piston as high as you can, while keeping the indicator between the arrows.   If the indicator doesn't stay between the arrows, you're breathing either too fast or too slow.  When you get it as high as you can, hold your breath for 10 seconds, or as long as possible. While you're holding your breath, the piston will slowly fall to the base of the spirometer.  Once the piston reaches the bottom of the spirometer, breathe out slowly through your mouth. Rest for a few seconds.  Repeat 10 times. Try to get the piston to the same level with each breath.  Repeat every hour while awake  You can carefully clean the outside of the mouthpiece with an alcohol wipe or soap and water.        Patient and Family Education             Ways You Can Help Prevent Blood Clots             This handout explains some simple things you can do to help prevent blood clots.      Blood clots are blockages that can form in the body's veins. When a blood clot forms in your deep veins, it may be called a deep vein thrombosis, or DVT for short. Blood clots can happen in any part of the body where blood flows, but they are most common in the arms and legs. If a piece of a blood clot breaks free and travels to the lungs, it is called a pulmonary embolus (PE). A PE can be a very serious problem.         Being in the hospital or having surgery can raise your chances of  getting a blood clot because you may not be well enough to move around as much as you normally do.         Ways you can help prevent blood clots in the hospital         Wearing SCDs. SCDs stands for Sequential Compression Devices.   SCDs are special sleeves that wrap around your legs  They attach to a pump that fills them with air to gently squeeze your legs every few minutes.   This helps return the blood in your legs to your heart.   SCDs should only be taken off when walking or bathing.   SCDs may not be comfortable, but they can help save your life.               Wearing compression stockings - if your doctor orders them. These special snug fitting stockings gently squeeze your legs to help blood flow.       Walking. Walking helps move the blood in your legs.   If your doctor says it is ok, try walking the halls at least   5 times a day. Ask us to help you get up, so you don't fall.      Taking any blood thinning medicines your doctor orders.        Page 1 of 2     [facility]; 3/23   Ways you can help prevent blood clots at home       Wearing compression stockings - if your doctor orders them. ? Walking - to help move the blood in your legs.       Taking any blood thinning medicines your doctor orders.      Signs of a blood clot or PE      Tell your doctor or nurse know right away if you have of the problems listed below.    If you are at home, seek medical care right away. Call 911 for chest pain or problems breathing.               Signs of a blood clot (DVT) - such as pain,  swelling, redness or warmth in your arm or leg      Signs of a pulmonary embolism (PE) - such as chest     pain or feeling short of breath                                Patient and Family Education Quitting Smoking or Tobacco       Recognizing Dangerous Situations:   Alcohol use during the first month after quitting   Being around smoke or someone who smokes    Times situation routinely smoked   Triggers: car, breaks, coffee,  when awakening, social events     Coping Skills:   Learning new ways to manage stress   Exercising   Relaxation breathing   Change routines   Distraction techniques     Websites:   Smoke-Free - offers free text messages and an cesar to help you quit. Info includes eating and mood issues that may come with quitting. There is a Live Helpline to talk to an expert. Go to smokefree.gov     Become an Ex-Smoker - the free EX Plan is based on scientific research and useful advice from ex-smokers. It isn't just about quitting smoking. It's about re-learning life without cigarettes using a 3-step program. Go to becomeanex.[employer]     Centers for Disease Control - offer many suggestions for helping you quit. Includes a Quit Guide and real-life stories. There are sections for specific groups such as LGBT, , different ethnic groups, and pregnant women. Go to cdc.gov/tobacco/campaign/tips     Other Resources:     [place] Tobacco Quit Line - call 1-800QUIT-NOW or 1-838.242.2954.   [employer] [place] 2-1-1 - to find local programs and resources. Call 211 or go to kites.io.[employer]. [facility] Tobacco Cessation Program - call 713-756-1808.   American Lung Association - offers classes for quitting smoking. Some places may charge a fee. For a list of classes, go to lung.org or call 9-625-LUNGBundlrUSA.     Some things to think about:   The health benefits of quitting smoking can help most of the major parts of your body.   There is no safe amount of cigarette smoke. Quitting smoking can add years to your life.   When you quit, you'll also protect your loved ones from dangerous secondhand smoke.   Make a plan, join a support group, and talk to your physician to assist in quitting smoking.     [facility], 11/20; [id number] (6/22)

## 2025-04-03 NOTE — H&P (VIEW-ONLY)
"CPM/PAT Evaluation       Name: Suly Vance (Suly Vance)  /Age: 1975/49 y.o.     In-Person       Chief Complaint: \"back pain\"    HPI  The patient is a 49 year old female.  She has a long history of low back pain.  In  she underwent lumbar spine surgery and in  spinal hardware was removed.  Post operatively she did well for several years.  Several months ago she began to experience lumbar spine pain with rest and this increases in intensity with weight bearing activities.  The pain radiates down the right leg to the thigh and down the left leg to the foot.  She can also have bilateral inguinal area pain and burning.  She has associated bilateral leg weakness, instability, numbness, tingling and falls.  Her last fall was 2 months ago.  She has done aquatic therapy and has had epidural injections, but she has not improved.  She was seen by Dr. Enriquez and surgical intervention is recommended.    Past Medical History:   Diagnosis Date    Anxiety     Arthritis     Chronic pain disorder     Fractures     GERD (gastroesophageal reflux disease)     Headache     Hyperlipidemia     Hypertension     Joint pain     Lumbar disc disease     Neuromuscular disorder (Multi)     Pneumonia     Seizure (Multi) 2002    X3 - immediately post-partum - no recurrence    Spinal stenosis        Past Surgical History:   Procedure Laterality Date     SECTION, LOW TRANSVERSE      LUMBAR FUSION      LUMBAR SPINE SURGERY      OTHER SURGICAL HISTORY  2008    Removal spine hardware    SPINAL FUSION      TONSILLECTOMY       Family History   Problem Relation Name Age of Onset    Cancer Mother Emily vasu     Stroke Mother Emily vasu     Hypertension Mother Emily vasu     Cervical cancer Mother Emily vasu     Mental illness Mother [place] vasu     Arthritis Mother Emily vasu     Depression Mother Emily vasu     Hypertension Father Jasvir Vegas     Arthritis Father Jasvir [place]     Diabetes Father " "Jasvir [place]     Cancer Sister      Cervical cancer Sister      Heart disease Maternal Grandmother      Cancer Paternal Grandmother Kathryn [place]     Breast cancer Paternal Grandmother Kathryn [place]         metastatic    Parkinsonism Paternal Grandfather       Social History     Tobacco Use    Smoking status: Every Day     Current packs/day: 0.50     Average packs/day: 0.5 packs/day for 35.3 years (17.6 ttl pk-yrs)     Types: Cigarettes     Start date: 1990     Passive exposure: Current    Smokeless tobacco: Never   Substance Use Topics    Alcohol use: Never     Social History     Substance and Sexual Activity   Drug Use Yes    Frequency: 7.0 times per week    Types: Marijuana    Comment: THC VAPE DAILY     Allergies   Allergen Reactions    Pregabalin Other     Unable to void    Duloxetine Other and Tinnitus     \"Moppy\"    Other Other     UV RAYS -HIVES WITH PROLONGED EXPOSURE    Pseudoephedrine Other     Bloody nose    Topiramate Other     Foggy headed    Zofran [Ondansetron Hcl] Headache    Nicotine Rash     patch    Nsaids (Non-Steroidal Anti-Inflammatory Drug) GI Upset and Unknown     No current facility-administered medications for this encounter.     Current Outpatient Medications   Medication Sig Dispense Refill    chlorhexidine (Peridex) 0.12 % solution Use as directed. 473 mL 0    chlorzoxazone (Parafon Forte) 500 mg tablet Take 1 tablet (500 mg) by mouth 4 times a day as needed for muscle spasms. 120 tablet 11    cholecalciferol (Vitamin D3) 50 MCG (2000 UT) tablet Take 1 tablet (2,000 Units) by mouth once daily. 90 tablet 3    gabapentin (Neurontin) 600 mg tablet TAKE 1 TABLET(600 MG) BY MOUTH THREE TIMES DAILY 270 tablet 3    multivit-minerals/folic acid (ADULT MULTIVITAMIN GUMMIES ORAL) Take by mouth once daily.      naloxone (Narcan) 4 mg/0.1 mL nasal spray Administer 1 spray (4 mg) into affected nostril(s) if needed for opioid reversal. May repeat every 2-3 minutes if needed, alternating " nostrils, until medical assistance becomes available. 2 each 0    pantoprazole (ProtoNix) 40 mg EC tablet TAKE 1 TABLET BY MOUTH EVERY DAY 30 tablet 11    SUMAtriptan (Imitrex) 100 mg tablet Take 1 tablet (100 mg) by mouth 1 time if needed for migraine. AT LEAST 2 HOURS BETWEEN DOSES AS NEEDED FOR HEADACHE 10 tablet 11    traZODone (Desyrel) 100 mg tablet TAKE 2 TABLETS(200 MG) BY MOUTH EVERY DAY AT BEDTIME 60 tablet 11    venlafaxine XR (Effexor-XR) 150 mg 24 hr capsule Take 1 capsule (150 mg) by mouth once daily. Take with food. 30 capsule 11     Review of Systems   Constitutional:  Positive for fatigue.   HENT: Negative.     Eyes: Negative.    Respiratory: Negative.     Cardiovascular: Negative.    Gastrointestinal: Negative.    Genitourinary: Negative.    Musculoskeletal:  Positive for arthralgias and back pain.   Neurological:  Positive for light-headedness.   Psychiatric/Behavioral: Negative.       /73   Pulse 79   Temp 36.8 °C (98.2 °F)   Resp 16   Ht 1.524 m (5')   Wt 49.9 kg (110 lb)   SpO2 99%   BMI 21.48 kg/m²     Physical Exam  Vitals reviewed.   Constitutional:       Comments: Thin appearance   HENT:      Head: Normocephalic and atraumatic.      Mouth/Throat:      Mouth: Mucous membranes are moist.      Pharynx: Oropharynx is clear.   Eyes:      Extraocular Movements: Extraocular movements intact.      Pupils: Pupils are equal, round, and reactive to light.   Cardiovascular:      Rate and Rhythm: Normal rate and regular rhythm.   Pulmonary:      Effort: Pulmonary effort is normal.      Breath sounds: Normal breath sounds.   Abdominal:      General: Bowel sounds are normal.      Palpations: Abdomen is soft.   Musculoskeletal:         General: No swelling.      Comments: Lumbar spine range of motion deferred.   Skin:     General: Skin is warm and dry.   Neurological:      Mental Status: She is alert and oriented to person, place, and time.   Psychiatric:         Mood and Affect: Mood normal.          Behavior: Behavior normal.        PAT AIRWAY:   Airway:     Mallampati::  II    TM distance::  >3 FB    Neck ROM::  Full   upper dentures and lower dentures    ASA:  II  DASI SCORE:  12.45  METS SCORE:  4.3  CHAD2 SCORE:  2.8%  REVISED CARDIAC RISK INDEX:  0.4%  STOP BANG SCORE:  0  CAPRINI DVT SCORE:  4  KYRIE SCORE:  0.06%  ARISCAT SCORE:  1.6%    CBC, BMP, PT/INR, TYPE & SCREEN done 3/20/2025   MRSA ordered during PAT visit  EKG (preliminary in PAT) - normal sinus rhythm, possible left atrial enlargement    Assessment and Plan:     Neurogenic claudication due to lumbar spinal stenosis, lumbar radiculopathy:  Lumbar 3-4 lateral lumbar fusion  Hypertension - currently no medication  Remote h/o seizure X 3 - 2002 post-partum, no recurrence  Cigarette smoker X 35 years, vapes marijuana daily    Sarah Kidd PA-C

## 2025-04-05 LAB — STAPHYLOCOCCUS SPEC CULT: NORMAL

## 2025-04-10 ENCOUNTER — ANESTHESIA (OUTPATIENT)
Dept: OPERATING ROOM | Facility: HOSPITAL | Age: 50
End: 2025-04-10
Payer: COMMERCIAL

## 2025-04-10 ENCOUNTER — PHARMACY VISIT (OUTPATIENT)
Dept: PHARMACY | Facility: CLINIC | Age: 50
End: 2025-04-10
Payer: COMMERCIAL

## 2025-04-10 ENCOUNTER — ANESTHESIA EVENT (OUTPATIENT)
Dept: OPERATING ROOM | Facility: HOSPITAL | Age: 50
End: 2025-04-10
Payer: COMMERCIAL

## 2025-04-10 ENCOUNTER — APPOINTMENT (OUTPATIENT)
Dept: RADIOLOGY | Facility: HOSPITAL | Age: 50
End: 2025-04-10
Payer: COMMERCIAL

## 2025-04-10 LAB
ATRIAL RATE: 61 BPM
P AXIS: 73 DEGREES
P OFFSET: 186 MS
P ONSET: 132 MS
PR INTERVAL: 182 MS
PREGNANCY TEST URINE, POC: NEGATIVE
Q ONSET: 223 MS
QRS COUNT: 10 BEATS
QRS DURATION: 74 MS
QT INTERVAL: 398 MS
QTC CALCULATION(BAZETT): 400 MS
QTC FREDERICIA: 399 MS
R AXIS: 23 DEGREES
T AXIS: 56 DEGREES
T OFFSET: 422 MS
VENTRICULAR RATE: 61 BPM

## 2025-04-10 PROCEDURE — RXMED WILLOW AMBULATORY MEDICATION CHARGE

## 2025-04-10 PROCEDURE — 3700000002 HC GENERAL ANESTHESIA TIME - EACH INCREMENTAL 1 MINUTE: Performed by: ORTHOPAEDIC SURGERY

## 2025-04-10 PROCEDURE — 0SG00A0 FUSION OF LUMBAR VERTEBRAL JOINT WITH INTERBODY FUSION DEVICE, ANTERIOR APPROACH, ANTERIOR COLUMN, OPEN APPROACH: ICD-10-PCS | Performed by: ORTHOPAEDIC SURGERY

## 2025-04-10 PROCEDURE — C1889 IMPLANT/INSERT DEVICE, NOC: HCPCS | Performed by: ORTHOPAEDIC SURGERY

## 2025-04-10 PROCEDURE — 99221 1ST HOSP IP/OBS SF/LOW 40: CPT | Performed by: NURSE PRACTITIONER

## 2025-04-10 PROCEDURE — 3700000001 HC GENERAL ANESTHESIA TIME - INITIAL BASE CHARGE: Performed by: ORTHOPAEDIC SURGERY

## 2025-04-10 PROCEDURE — 1100000001 HC PRIVATE ROOM DAILY

## 2025-04-10 PROCEDURE — 2500000005 HC RX 250 GENERAL PHARMACY W/O HCPCS: Performed by: ORTHOPAEDIC SURGERY

## 2025-04-10 PROCEDURE — 97165 OT EVAL LOW COMPLEX 30 MIN: CPT | Mod: GO

## 2025-04-10 PROCEDURE — 2500000005 HC RX 250 GENERAL PHARMACY W/O HCPCS: Performed by: NURSE ANESTHETIST, CERTIFIED REGISTERED

## 2025-04-10 PROCEDURE — 20930 SP BONE ALGRFT MORSEL ADD-ON: CPT | Performed by: ORTHOPAEDIC SURGERY

## 2025-04-10 PROCEDURE — 2500000004 HC RX 250 GENERAL PHARMACY W/ HCPCS (ALT 636 FOR OP/ED): Performed by: ORTHOPAEDIC SURGERY

## 2025-04-10 PROCEDURE — 0SB20ZZ EXCISION OF LUMBAR VERTEBRAL DISC, OPEN APPROACH: ICD-10-PCS | Performed by: ORTHOPAEDIC SURGERY

## 2025-04-10 PROCEDURE — 7100000002 HC RECOVERY ROOM TIME - EACH INCREMENTAL 1 MINUTE: Performed by: ORTHOPAEDIC SURGERY

## 2025-04-10 PROCEDURE — 22853 INSJ BIOMECHANICAL DEVICE: CPT | Performed by: ORTHOPAEDIC SURGERY

## 2025-04-10 PROCEDURE — 2500000004 HC RX 250 GENERAL PHARMACY W/ HCPCS (ALT 636 FOR OP/ED): Performed by: ANESTHESIOLOGY

## 2025-04-10 PROCEDURE — 7100000001 HC RECOVERY ROOM TIME - INITIAL BASE CHARGE: Performed by: ORTHOPAEDIC SURGERY

## 2025-04-10 PROCEDURE — A4649 SURGICAL SUPPLIES: HCPCS | Performed by: ORTHOPAEDIC SURGERY

## 2025-04-10 PROCEDURE — 2500000004 HC RX 250 GENERAL PHARMACY W/ HCPCS (ALT 636 FOR OP/ED): Performed by: NURSE ANESTHETIST, CERTIFIED REGISTERED

## 2025-04-10 PROCEDURE — 2720000007 HC OR 272 NO HCPCS: Performed by: ORTHOPAEDIC SURGERY

## 2025-04-10 PROCEDURE — 3600000018 HC OR TIME - INITIAL BASE CHARGE - PROCEDURE LEVEL SIX: Performed by: ORTHOPAEDIC SURGERY

## 2025-04-10 PROCEDURE — 2500000001 HC RX 250 WO HCPCS SELF ADMINISTERED DRUGS (ALT 637 FOR MEDICARE OP): Performed by: ORTHOPAEDIC SURGERY

## 2025-04-10 PROCEDURE — 81025 URINE PREGNANCY TEST: CPT | Performed by: ORTHOPAEDIC SURGERY

## 2025-04-10 PROCEDURE — 22845 INSERT SPINE FIXATION DEVICE: CPT | Performed by: ORTHOPAEDIC SURGERY

## 2025-04-10 PROCEDURE — 97161 PT EVAL LOW COMPLEX 20 MIN: CPT | Mod: GP

## 2025-04-10 PROCEDURE — C1821 INTERSPINOUS IMPLANT: HCPCS | Performed by: ORTHOPAEDIC SURGERY

## 2025-04-10 PROCEDURE — 2780000003 HC OR 278 NO HCPCS: Performed by: ORTHOPAEDIC SURGERY

## 2025-04-10 PROCEDURE — 22558 ARTHRD ANT NTRBD MIN DSC LUM: CPT | Performed by: ORTHOPAEDIC SURGERY

## 2025-04-10 PROCEDURE — 3600000017 HC OR TIME - EACH INCREMENTAL 1 MINUTE - PROCEDURE LEVEL SIX: Performed by: ORTHOPAEDIC SURGERY

## 2025-04-10 DEVICE — IMPLANTABLE DEVICE: Type: IMPLANTABLE DEVICE | Site: SPINE LUMBAR | Status: FUNCTIONAL

## 2025-04-10 DEVICE — MODULUS XLW, 10X22X50MM 10°
Type: IMPLANTABLE DEVICE | Site: SPINE LUMBAR | Status: FUNCTIONAL
Brand: MODULUS

## 2025-04-10 DEVICE — BONE GRAFT PUTTY OSSIFUSE FLOWABLE FIBER 5CC: Type: IMPLANTABLE DEVICE | Site: SPINE LUMBAR | Status: FUNCTIONAL

## 2025-04-10 RX ORDER — METHOCARBAMOL 100 MG/ML
INJECTION, SOLUTION INTRAMUSCULAR; INTRAVENOUS AS NEEDED
Status: DISCONTINUED | OUTPATIENT
Start: 2025-04-10 | End: 2025-04-10

## 2025-04-10 RX ORDER — ACETAMINOPHEN 325 MG/1
975 TABLET ORAL ONCE
Status: COMPLETED | OUTPATIENT
Start: 2025-04-10 | End: 2025-04-10

## 2025-04-10 RX ORDER — MIDAZOLAM HYDROCHLORIDE 1 MG/ML
INJECTION, SOLUTION INTRAMUSCULAR; INTRAVENOUS AS NEEDED
Status: DISCONTINUED | OUTPATIENT
Start: 2025-04-10 | End: 2025-04-10

## 2025-04-10 RX ORDER — DEXTROSE 50 % IN WATER (D50W) INTRAVENOUS SYRINGE
25
Status: DISCONTINUED | OUTPATIENT
Start: 2025-04-10 | End: 2025-04-11 | Stop reason: HOSPADM

## 2025-04-10 RX ORDER — ONDANSETRON HYDROCHLORIDE 2 MG/ML
INJECTION, SOLUTION INTRAVENOUS AS NEEDED
Status: DISCONTINUED | OUTPATIENT
Start: 2025-04-10 | End: 2025-04-10

## 2025-04-10 RX ORDER — DEXAMETHASONE SODIUM PHOSPHATE 10 MG/ML
INJECTION INTRAMUSCULAR; INTRAVENOUS AS NEEDED
Status: DISCONTINUED | OUTPATIENT
Start: 2025-04-10 | End: 2025-04-10

## 2025-04-10 RX ORDER — KETOROLAC TROMETHAMINE 30 MG/ML
15 INJECTION, SOLUTION INTRAMUSCULAR; INTRAVENOUS EVERY 6 HOURS SCHEDULED
Status: DISCONTINUED | OUTPATIENT
Start: 2025-04-10 | End: 2025-04-11 | Stop reason: HOSPADM

## 2025-04-10 RX ORDER — MIDAZOLAM HYDROCHLORIDE 1 MG/ML
1 INJECTION, SOLUTION INTRAMUSCULAR; INTRAVENOUS ONCE AS NEEDED
Status: DISCONTINUED | OUTPATIENT
Start: 2025-04-10 | End: 2025-04-10 | Stop reason: HOSPADM

## 2025-04-10 RX ORDER — ACETAMINOPHEN 325 MG/1
975 TABLET ORAL EVERY 8 HOURS
Status: DISCONTINUED | OUTPATIENT
Start: 2025-04-10 | End: 2025-04-11 | Stop reason: HOSPADM

## 2025-04-10 RX ORDER — VENLAFAXINE HYDROCHLORIDE 150 MG/1
150 CAPSULE, EXTENDED RELEASE ORAL DAILY
Status: DISCONTINUED | OUTPATIENT
Start: 2025-04-11 | End: 2025-04-11 | Stop reason: HOSPADM

## 2025-04-10 RX ORDER — PHENYLEPHRINE HCL IN 0.9% NACL 0.4MG/10ML
SYRINGE (ML) INTRAVENOUS AS NEEDED
Status: DISCONTINUED | OUTPATIENT
Start: 2025-04-10 | End: 2025-04-10

## 2025-04-10 RX ORDER — SUCCINYLCHOLINE CHLORIDE 100 MG/5ML
SYRINGE (ML) INTRAVENOUS AS NEEDED
Status: DISCONTINUED | OUTPATIENT
Start: 2025-04-10 | End: 2025-04-10

## 2025-04-10 RX ORDER — ACETAMINOPHEN 500 MG
1000 TABLET ORAL EVERY 8 HOURS
COMMUNITY
Start: 2025-04-10 | End: 2025-04-24

## 2025-04-10 RX ORDER — TRAZODONE HYDROCHLORIDE 100 MG/1
200 TABLET ORAL NIGHTLY
Status: DISCONTINUED | OUTPATIENT
Start: 2025-04-10 | End: 2025-04-11 | Stop reason: HOSPADM

## 2025-04-10 RX ORDER — ONDANSETRON HYDROCHLORIDE 2 MG/ML
4 INJECTION, SOLUTION INTRAVENOUS EVERY 8 HOURS PRN
Status: DISCONTINUED | OUTPATIENT
Start: 2025-04-10 | End: 2025-04-11 | Stop reason: HOSPADM

## 2025-04-10 RX ORDER — ONDANSETRON HYDROCHLORIDE 2 MG/ML
4 INJECTION, SOLUTION INTRAVENOUS ONCE AS NEEDED
Status: DISCONTINUED | OUTPATIENT
Start: 2025-04-10 | End: 2025-04-10 | Stop reason: HOSPADM

## 2025-04-10 RX ORDER — FENTANYL CITRATE 50 UG/ML
50 INJECTION, SOLUTION INTRAMUSCULAR; INTRAVENOUS EVERY 5 MIN PRN
Status: DISCONTINUED | OUTPATIENT
Start: 2025-04-10 | End: 2025-04-10 | Stop reason: HOSPADM

## 2025-04-10 RX ORDER — CEFAZOLIN SODIUM 2 G/100ML
2 INJECTION, SOLUTION INTRAVENOUS EVERY 8 HOURS
Status: COMPLETED | OUTPATIENT
Start: 2025-04-10 | End: 2025-04-10

## 2025-04-10 RX ORDER — POLYETHYLENE GLYCOL 3350 17 G/17G
17 POWDER, FOR SOLUTION ORAL DAILY
Status: DISCONTINUED | OUTPATIENT
Start: 2025-04-10 | End: 2025-04-11 | Stop reason: HOSPADM

## 2025-04-10 RX ORDER — LIDOCAINE HYDROCHLORIDE 20 MG/ML
INJECTION, SOLUTION INFILTRATION; PERINEURAL AS NEEDED
Status: DISCONTINUED | OUTPATIENT
Start: 2025-04-10 | End: 2025-04-10

## 2025-04-10 RX ORDER — SODIUM CHLORIDE, SODIUM LACTATE, POTASSIUM CHLORIDE, CALCIUM CHLORIDE 600; 310; 30; 20 MG/100ML; MG/100ML; MG/100ML; MG/100ML
100 INJECTION, SOLUTION INTRAVENOUS CONTINUOUS
Status: DISCONTINUED | OUTPATIENT
Start: 2025-04-10 | End: 2025-04-10 | Stop reason: HOSPADM

## 2025-04-10 RX ORDER — POLYETHYLENE GLYCOL 3350 17 G/17G
17 POWDER, FOR SOLUTION ORAL 2 TIMES DAILY PRN
COMMUNITY
Start: 2025-04-10 | End: 2025-05-10

## 2025-04-10 RX ORDER — METHOCARBAMOL 500 MG/1
1000 TABLET, FILM COATED ORAL 3 TIMES DAILY
Status: DISCONTINUED | OUTPATIENT
Start: 2025-04-10 | End: 2025-04-11 | Stop reason: HOSPADM

## 2025-04-10 RX ORDER — MEPERIDINE HYDROCHLORIDE 25 MG/ML
12.5 INJECTION INTRAMUSCULAR; INTRAVENOUS; SUBCUTANEOUS EVERY 10 MIN PRN
Status: DISCONTINUED | OUTPATIENT
Start: 2025-04-10 | End: 2025-04-10 | Stop reason: HOSPADM

## 2025-04-10 RX ORDER — METHOCARBAMOL 500 MG/1
TABLET, FILM COATED ORAL
Qty: 60 TABLET | Refills: 2 | Status: SHIPPED | OUTPATIENT
Start: 2025-04-10

## 2025-04-10 RX ORDER — PROCHLORPERAZINE EDISYLATE 5 MG/ML
10 INJECTION INTRAMUSCULAR; INTRAVENOUS EVERY 6 HOURS PRN
Status: DISCONTINUED | OUTPATIENT
Start: 2025-04-10 | End: 2025-04-11 | Stop reason: HOSPADM

## 2025-04-10 RX ORDER — OXYCODONE HYDROCHLORIDE 5 MG/1
5 TABLET ORAL EVERY 4 HOURS PRN
Qty: 40 TABLET | Refills: 0 | Status: SHIPPED | OUTPATIENT
Start: 2025-04-10 | End: 2025-04-22 | Stop reason: SDUPTHER

## 2025-04-10 RX ORDER — GABAPENTIN 600 MG/1
600 TABLET ORAL 3 TIMES DAILY
Status: DISCONTINUED | OUTPATIENT
Start: 2025-04-10 | End: 2025-04-11 | Stop reason: HOSPADM

## 2025-04-10 RX ORDER — PANTOPRAZOLE SODIUM 40 MG/1
40 TABLET, DELAYED RELEASE ORAL DAILY
Status: DISCONTINUED | OUTPATIENT
Start: 2025-04-11 | End: 2025-04-11 | Stop reason: HOSPADM

## 2025-04-10 RX ORDER — NALOXONE HYDROCHLORIDE 0.4 MG/ML
0.2 INJECTION, SOLUTION INTRAMUSCULAR; INTRAVENOUS; SUBCUTANEOUS EVERY 5 MIN PRN
Status: DISCONTINUED | OUTPATIENT
Start: 2025-04-10 | End: 2025-04-11 | Stop reason: HOSPADM

## 2025-04-10 RX ORDER — TRANEXAMIC ACID 10 MG/ML
INJECTION, SOLUTION INTRAVENOUS AS NEEDED
Status: DISCONTINUED | OUTPATIENT
Start: 2025-04-10 | End: 2025-04-10

## 2025-04-10 RX ORDER — KETOROLAC TROMETHAMINE 30 MG/ML
INJECTION, SOLUTION INTRAMUSCULAR; INTRAVENOUS AS NEEDED
Status: DISCONTINUED | OUTPATIENT
Start: 2025-04-10 | End: 2025-04-10

## 2025-04-10 RX ORDER — FENTANYL CITRATE 50 UG/ML
INJECTION, SOLUTION INTRAMUSCULAR; INTRAVENOUS AS NEEDED
Status: DISCONTINUED | OUTPATIENT
Start: 2025-04-10 | End: 2025-04-10

## 2025-04-10 RX ORDER — OXYCODONE HYDROCHLORIDE 5 MG/1
5 TABLET ORAL EVERY 4 HOURS PRN
Status: DISCONTINUED | OUTPATIENT
Start: 2025-04-10 | End: 2025-04-11 | Stop reason: HOSPADM

## 2025-04-10 RX ORDER — SODIUM CHLORIDE, SODIUM LACTATE, POTASSIUM CHLORIDE, CALCIUM CHLORIDE 600; 310; 30; 20 MG/100ML; MG/100ML; MG/100ML; MG/100ML
100 INJECTION, SOLUTION INTRAVENOUS CONTINUOUS
Status: ACTIVE | OUTPATIENT
Start: 2025-04-10 | End: 2025-04-11

## 2025-04-10 RX ORDER — CEFAZOLIN SODIUM 2 G/100ML
2 INJECTION, SOLUTION INTRAVENOUS ONCE
Status: COMPLETED | OUTPATIENT
Start: 2025-04-10 | End: 2025-04-10

## 2025-04-10 RX ORDER — LIDOCAINE HYDROCHLORIDE 10 MG/ML
0.1 INJECTION, SOLUTION INFILTRATION; PERINEURAL ONCE
Status: DISCONTINUED | OUTPATIENT
Start: 2025-04-10 | End: 2025-04-10 | Stop reason: HOSPADM

## 2025-04-10 RX ORDER — SODIUM CHLORIDE, SODIUM LACTATE, POTASSIUM CHLORIDE, CALCIUM CHLORIDE 600; 310; 30; 20 MG/100ML; MG/100ML; MG/100ML; MG/100ML
INJECTION, SOLUTION INTRAVENOUS CONTINUOUS PRN
Status: DISCONTINUED | OUTPATIENT
Start: 2025-04-10 | End: 2025-04-10

## 2025-04-10 RX ORDER — PROCHLORPERAZINE MALEATE 10 MG
10 TABLET ORAL EVERY 6 HOURS PRN
Status: DISCONTINUED | OUTPATIENT
Start: 2025-04-10 | End: 2025-04-11 | Stop reason: HOSPADM

## 2025-04-10 RX ORDER — ONDANSETRON 4 MG/1
4 TABLET, ORALLY DISINTEGRATING ORAL EVERY 8 HOURS PRN
Status: DISCONTINUED | OUTPATIENT
Start: 2025-04-10 | End: 2025-04-11 | Stop reason: HOSPADM

## 2025-04-10 RX ORDER — DEXMEDETOMIDINE HYDROCHLORIDE 100 UG/ML
INJECTION, SOLUTION INTRAVENOUS AS NEEDED
Status: DISCONTINUED | OUTPATIENT
Start: 2025-04-10 | End: 2025-04-10

## 2025-04-10 RX ORDER — HYDROMORPHONE HYDROCHLORIDE 0.2 MG/ML
0.2 INJECTION INTRAMUSCULAR; INTRAVENOUS; SUBCUTANEOUS EVERY 5 MIN PRN
Status: DISCONTINUED | OUTPATIENT
Start: 2025-04-10 | End: 2025-04-10 | Stop reason: HOSPADM

## 2025-04-10 RX ORDER — OXYCODONE HYDROCHLORIDE 5 MG/1
10 TABLET ORAL EVERY 4 HOURS PRN
Status: DISCONTINUED | OUTPATIENT
Start: 2025-04-10 | End: 2025-04-11 | Stop reason: HOSPADM

## 2025-04-10 RX ORDER — PROCHLORPERAZINE 25 MG/1
25 SUPPOSITORY RECTAL EVERY 12 HOURS PRN
Status: DISCONTINUED | OUTPATIENT
Start: 2025-04-10 | End: 2025-04-11 | Stop reason: HOSPADM

## 2025-04-10 RX ORDER — BISACODYL 5 MG
10 TABLET, DELAYED RELEASE (ENTERIC COATED) ORAL DAILY PRN
Status: DISCONTINUED | OUTPATIENT
Start: 2025-04-10 | End: 2025-04-11 | Stop reason: HOSPADM

## 2025-04-10 RX ORDER — GABAPENTIN 300 MG/1
600 CAPSULE ORAL ONCE
Status: DISCONTINUED | OUTPATIENT
Start: 2025-04-10 | End: 2025-04-10 | Stop reason: HOSPADM

## 2025-04-10 RX ORDER — ALBUTEROL SULFATE 0.83 MG/ML
2.5 SOLUTION RESPIRATORY (INHALATION) ONCE
Status: DISCONTINUED | OUTPATIENT
Start: 2025-04-10 | End: 2025-04-10 | Stop reason: HOSPADM

## 2025-04-10 RX ORDER — PROPOFOL 10 MG/ML
INJECTION, EMULSION INTRAVENOUS AS NEEDED
Status: DISCONTINUED | OUTPATIENT
Start: 2025-04-10 | End: 2025-04-10

## 2025-04-10 RX ORDER — DEXTROSE 50 % IN WATER (D50W) INTRAVENOUS SYRINGE
12.5
Status: DISCONTINUED | OUTPATIENT
Start: 2025-04-10 | End: 2025-04-11 | Stop reason: HOSPADM

## 2025-04-10 RX ADMIN — REMIFENTANIL HYDROCHLORIDE 0.05 MCG/KG/MIN: 1 INJECTION, POWDER, LYOPHILIZED, FOR SOLUTION INTRAVENOUS at 08:13

## 2025-04-10 RX ADMIN — FENTANYL CITRATE 50 MCG: 50 INJECTION INTRAMUSCULAR; INTRAVENOUS at 09:27

## 2025-04-10 RX ADMIN — KETOROLAC TROMETHAMINE 15 MG: 30 INJECTION, SOLUTION INTRAMUSCULAR at 23:01

## 2025-04-10 RX ADMIN — POVIDONE-IODINE 1 APPLICATION: 5 SOLUTION TOPICAL at 06:22

## 2025-04-10 RX ADMIN — POLYETHYLENE GLYCOL 3350 17 G: 17 POWDER, FOR SOLUTION ORAL at 11:00

## 2025-04-10 RX ADMIN — GABAPENTIN 600 MG: 600 TABLET, FILM COATED ORAL at 11:00

## 2025-04-10 RX ADMIN — KETOROLAC TROMETHAMINE 15 MG: 30 INJECTION, SOLUTION INTRAMUSCULAR at 18:29

## 2025-04-10 RX ADMIN — DEXAMETHASONE SODIUM PHOSPHATE 8 MG: 4 INJECTION, SOLUTION INTRAMUSCULAR; INTRAVENOUS at 14:04

## 2025-04-10 RX ADMIN — PROPOFOL 180 MG: 10 INJECTION, EMULSION INTRAVENOUS at 07:50

## 2025-04-10 RX ADMIN — MIDAZOLAM 2 MG: 1 INJECTION INTRAMUSCULAR; INTRAVENOUS at 07:34

## 2025-04-10 RX ADMIN — OXYCODONE HYDROCHLORIDE 10 MG: 5 TABLET ORAL at 15:53

## 2025-04-10 RX ADMIN — SODIUM CHLORIDE, POTASSIUM CHLORIDE, SODIUM LACTATE AND CALCIUM CHLORIDE: 600; 310; 30; 20 INJECTION, SOLUTION INTRAVENOUS at 07:35

## 2025-04-10 RX ADMIN — KETOROLAC TROMETHAMINE 30 MG: 30 INJECTION, SOLUTION INTRAMUSCULAR at 08:38

## 2025-04-10 RX ADMIN — ACETAMINOPHEN 975 MG: 325 TABLET, FILM COATED ORAL at 14:04

## 2025-04-10 RX ADMIN — DEXMEDETOMIDINE 6 MCG: 100 INJECTION, SOLUTION INTRAVENOUS at 08:28

## 2025-04-10 RX ADMIN — CEFAZOLIN SODIUM 2 G: 2 INJECTION, SOLUTION INTRAVENOUS at 07:54

## 2025-04-10 RX ADMIN — HYDROMORPHONE HYDROCHLORIDE 0.2 MG: 0.2 INJECTION, SOLUTION INTRAMUSCULAR; INTRAVENOUS; SUBCUTANEOUS at 09:40

## 2025-04-10 RX ADMIN — ONDANSETRON 4 MG: 2 INJECTION, SOLUTION INTRAMUSCULAR; INTRAVENOUS at 08:38

## 2025-04-10 RX ADMIN — KETOROLAC TROMETHAMINE 15 MG: 30 INJECTION, SOLUTION INTRAMUSCULAR at 14:34

## 2025-04-10 RX ADMIN — TRAZODONE HYDROCHLORIDE 200 MG: 100 TABLET ORAL at 20:12

## 2025-04-10 RX ADMIN — DEXAMETHASONE SODIUM PHOSPHATE 8 MG: 4 INJECTION, SOLUTION INTRAMUSCULAR; INTRAVENOUS at 22:56

## 2025-04-10 RX ADMIN — Medication 100 MCG: at 08:42

## 2025-04-10 RX ADMIN — GABAPENTIN 600 MG: 600 TABLET, FILM COATED ORAL at 14:04

## 2025-04-10 RX ADMIN — PROPOFOL 20 MG: 10 INJECTION, EMULSION INTRAVENOUS at 08:13

## 2025-04-10 RX ADMIN — SODIUM CHLORIDE, SODIUM LACTATE, POTASSIUM CHLORIDE, AND CALCIUM CHLORIDE 100 ML/HR: 600; 310; 30; 20 INJECTION, SOLUTION INTRAVENOUS at 10:45

## 2025-04-10 RX ADMIN — LIDOCAINE HYDROCHLORIDE 100 MG: 20 INJECTION, SOLUTION INFILTRATION; PERINEURAL at 07:50

## 2025-04-10 RX ADMIN — Medication 160 MG: at 07:50

## 2025-04-10 RX ADMIN — FENTANYL CITRATE 50 MCG: 50 INJECTION, SOLUTION INTRAMUSCULAR; INTRAVENOUS at 08:08

## 2025-04-10 RX ADMIN — ACETAMINOPHEN 975 MG: 325 TABLET, FILM COATED ORAL at 22:56

## 2025-04-10 RX ADMIN — GABAPENTIN 600 MG: 600 TABLET, FILM COATED ORAL at 20:12

## 2025-04-10 RX ADMIN — METHOCARBAMOL 1000 MG: 500 TABLET ORAL at 15:52

## 2025-04-10 RX ADMIN — Medication 100 MCG: at 07:57

## 2025-04-10 RX ADMIN — DEXAMETHASONE SODIUM PHOSPHATE 10 MG: 10 INJECTION INTRAMUSCULAR; INTRAVENOUS at 07:57

## 2025-04-10 RX ADMIN — OXYCODONE HYDROCHLORIDE 10 MG: 5 TABLET ORAL at 11:01

## 2025-04-10 RX ADMIN — FENTANYL CITRATE 50 MCG: 50 INJECTION INTRAMUSCULAR; INTRAVENOUS at 09:18

## 2025-04-10 RX ADMIN — CEFAZOLIN SODIUM 2 G: 2 INJECTION, SOLUTION INTRAVENOUS at 15:53

## 2025-04-10 RX ADMIN — TRANEXAMIC ACID 1000 MG: 10 INJECTION, SOLUTION INTRAVENOUS at 07:54

## 2025-04-10 RX ADMIN — FENTANYL CITRATE 50 MCG: 50 INJECTION, SOLUTION INTRAMUSCULAR; INTRAVENOUS at 07:50

## 2025-04-10 RX ADMIN — OXYCODONE HYDROCHLORIDE 10 MG: 5 TABLET ORAL at 20:12

## 2025-04-10 RX ADMIN — METHOCARBAMOL 1000 MG: 500 TABLET ORAL at 23:31

## 2025-04-10 RX ADMIN — ACETAMINOPHEN 975 MG: 325 TABLET, FILM COATED ORAL at 06:21

## 2025-04-10 RX ADMIN — METHOCARBAMOL 1000 MG: 1000 INJECTION, SOLUTION INTRAMUSCULAR; INTRAVENOUS at 08:37

## 2025-04-10 RX ADMIN — CEFAZOLIN SODIUM 2 G: 2 INJECTION, SOLUTION INTRAVENOUS at 23:01

## 2025-04-10 SDOH — HEALTH STABILITY: PHYSICAL HEALTH: ON AVERAGE, HOW MANY DAYS PER WEEK DO YOU ENGAGE IN MODERATE TO STRENUOUS EXERCISE (LIKE A BRISK WALK)?: 0 DAYS

## 2025-04-10 SDOH — HEALTH STABILITY: MENTAL HEALTH
DO YOU FEEL STRESS - TENSE, RESTLESS, NERVOUS, OR ANXIOUS, OR UNABLE TO SLEEP AT NIGHT BECAUSE YOUR MIND IS TROUBLED ALL THE TIME - THESE DAYS?: NOT AT ALL

## 2025-04-10 SDOH — SOCIAL STABILITY: SOCIAL INSECURITY
WITHIN THE LAST YEAR, HAVE YOU BEEN KICKED, HIT, SLAPPED, OR OTHERWISE PHYSICALLY HURT BY YOUR PARTNER OR EX-PARTNER?: NO

## 2025-04-10 SDOH — ECONOMIC STABILITY: TRANSPORTATION INSECURITY: IN THE PAST 12 MONTHS, HAS LACK OF TRANSPORTATION KEPT YOU FROM MEDICAL APPOINTMENTS OR FROM GETTING MEDICATIONS?: NO

## 2025-04-10 SDOH — SOCIAL STABILITY: SOCIAL INSECURITY: DOES ANYONE TRY TO KEEP YOU FROM HAVING/CONTACTING OTHER FRIENDS OR DOING THINGS OUTSIDE YOUR HOME?: NO

## 2025-04-10 SDOH — SOCIAL STABILITY: SOCIAL INSECURITY: HAVE YOU HAD ANY THOUGHTS OF HARMING ANYONE ELSE?: NO

## 2025-04-10 SDOH — ECONOMIC STABILITY: HOUSING INSECURITY: DO YOU FEEL UNSAFE GOING BACK TO THE PLACE WHERE YOU LIVE?: NO

## 2025-04-10 SDOH — SOCIAL STABILITY: SOCIAL INSECURITY: WITHIN THE LAST YEAR, HAVE YOU BEEN AFRAID OF YOUR PARTNER OR EX-PARTNER?: NO

## 2025-04-10 SDOH — ECONOMIC STABILITY: FOOD INSECURITY: WITHIN THE PAST 12 MONTHS, YOU WORRIED THAT YOUR FOOD WOULD RUN OUT BEFORE YOU GOT THE MONEY TO BUY MORE.: NEVER TRUE

## 2025-04-10 SDOH — SOCIAL STABILITY: SOCIAL INSECURITY: WITHIN THE LAST YEAR, HAVE YOU BEEN HUMILIATED OR EMOTIONALLY ABUSED IN OTHER WAYS BY YOUR PARTNER OR EX-PARTNER?: NO

## 2025-04-10 SDOH — ECONOMIC STABILITY: FOOD INSECURITY: WITHIN THE PAST 12 MONTHS, THE FOOD YOU BOUGHT JUST DIDN'T LAST AND YOU DIDN'T HAVE MONEY TO GET MORE.: NEVER TRUE

## 2025-04-10 SDOH — ECONOMIC STABILITY: HOUSING INSECURITY: IN THE PAST 12 MONTHS, HOW MANY TIMES HAVE YOU MOVED WHERE YOU WERE LIVING?: 0

## 2025-04-10 SDOH — HEALTH STABILITY: PHYSICAL HEALTH
HOW OFTEN DO YOU NEED TO HAVE SOMEONE HELP YOU WHEN YOU READ INSTRUCTIONS, PAMPHLETS, OR OTHER WRITTEN MATERIAL FROM YOUR DOCTOR OR PHARMACY?: NEVER

## 2025-04-10 SDOH — SOCIAL STABILITY: SOCIAL INSECURITY
WITHIN THE LAST YEAR, HAVE YOU BEEN RAPED OR FORCED TO HAVE ANY KIND OF SEXUAL ACTIVITY BY YOUR PARTNER OR EX-PARTNER?: NO

## 2025-04-10 SDOH — ECONOMIC STABILITY: INCOME INSECURITY: IN THE PAST 12 MONTHS HAS THE ELECTRIC, GAS, OIL, OR WATER COMPANY THREATENED TO SHUT OFF SERVICES IN YOUR HOME?: NO

## 2025-04-10 SDOH — SOCIAL STABILITY: SOCIAL INSECURITY: HAVE YOU HAD THOUGHTS OF HARMING ANYONE ELSE?: NO

## 2025-04-10 SDOH — SOCIAL STABILITY: SOCIAL NETWORK: HOW OFTEN DO YOU ATTEND MEETINGS OF THE CLUBS OR ORGANIZATIONS YOU BELONG TO?: NEVER

## 2025-04-10 SDOH — SOCIAL STABILITY: SOCIAL NETWORK
IN A TYPICAL WEEK, HOW MANY TIMES DO YOU TALK ON THE PHONE WITH FAMILY, FRIENDS, OR NEIGHBORS?: MORE THAN THREE TIMES A WEEK

## 2025-04-10 SDOH — SOCIAL STABILITY: SOCIAL NETWORK: HOW OFTEN DO YOU ATTEND CHURCH OR RELIGIOUS SERVICES?: NEVER

## 2025-04-10 SDOH — SOCIAL STABILITY: SOCIAL INSECURITY: DO YOU FEEL ANYONE HAS EXPLOITED OR TAKEN ADVANTAGE OF YOU FINANCIALLY OR OF YOUR PERSONAL PROPERTY?: NO

## 2025-04-10 SDOH — ECONOMIC STABILITY: HOUSING INSECURITY: AT ANY TIME IN THE PAST 12 MONTHS, WERE YOU HOMELESS OR LIVING IN A SHELTER (INCLUDING NOW)?: NO

## 2025-04-10 SDOH — HEALTH STABILITY: PHYSICAL HEALTH: ON AVERAGE, HOW MANY MINUTES DO YOU ENGAGE IN EXERCISE AT THIS LEVEL?: 0 MIN

## 2025-04-10 SDOH — SOCIAL STABILITY: SOCIAL NETWORK
DO YOU BELONG TO ANY CLUBS OR ORGANIZATIONS SUCH AS CHURCH GROUPS, UNIONS, FRATERNAL OR ATHLETIC GROUPS, OR SCHOOL GROUPS?: NO

## 2025-04-10 SDOH — ECONOMIC STABILITY: HOUSING INSECURITY: IN THE LAST 12 MONTHS, WAS THERE A TIME WHEN YOU WERE NOT ABLE TO PAY THE MORTGAGE OR RENT ON TIME?: NO

## 2025-04-10 SDOH — SOCIAL STABILITY: SOCIAL INSECURITY: HAS ANYONE EVER THREATENED TO HURT YOUR FAMILY OR YOUR PETS?: NO

## 2025-04-10 SDOH — SOCIAL STABILITY: SOCIAL INSECURITY: ABUSE: ADULT

## 2025-04-10 SDOH — ECONOMIC STABILITY: FOOD INSECURITY: HOW HARD IS IT FOR YOU TO PAY FOR THE VERY BASICS LIKE FOOD, HOUSING, MEDICAL CARE, AND HEATING?: NOT HARD AT ALL

## 2025-04-10 SDOH — SOCIAL STABILITY: SOCIAL INSECURITY: WERE YOU ABLE TO COMPLETE ALL THE BEHAVIORAL HEALTH SCREENINGS?: YES

## 2025-04-10 SDOH — SOCIAL STABILITY: SOCIAL INSECURITY: ARE YOU MARRIED, WIDOWED, DIVORCED, SEPARATED, NEVER MARRIED, OR LIVING WITH A PARTNER?: MARRIED

## 2025-04-10 SDOH — SOCIAL STABILITY: SOCIAL NETWORK: HOW OFTEN DO YOU GET TOGETHER WITH FRIENDS OR RELATIVES?: MORE THAN THREE TIMES A WEEK

## 2025-04-10 SDOH — SOCIAL STABILITY: SOCIAL INSECURITY: ARE THERE ANY APPARENT SIGNS OF INJURIES/BEHAVIORS THAT COULD BE RELATED TO ABUSE/NEGLECT?: NO

## 2025-04-10 SDOH — SOCIAL STABILITY: SOCIAL INSECURITY: ARE YOU OR HAVE YOU BEEN THREATENED OR ABUSED PHYSICALLY, EMOTIONALLY, OR SEXUALLY BY ANYONE?: NO

## 2025-04-10 SDOH — SOCIAL STABILITY: SOCIAL INSECURITY: DO YOU FEEL UNSAFE GOING BACK TO THE PLACE WHERE YOU ARE LIVING?: NO

## 2025-04-10 ASSESSMENT — PAIN - FUNCTIONAL ASSESSMENT
PAIN_FUNCTIONAL_ASSESSMENT: 0-10
PAIN_FUNCTIONAL_ASSESSMENT: FLACC (FACE, LEGS, ACTIVITY, CRY, CONSOLABILITY)
PAIN_FUNCTIONAL_ASSESSMENT: 0-10

## 2025-04-10 ASSESSMENT — ENCOUNTER SYMPTOMS
CARDIOVASCULAR NEGATIVE: 1
CONSTITUTIONAL NEGATIVE: 1
ENDOCRINE NEGATIVE: 1
RESPIRATORY NEGATIVE: 1
PSYCHIATRIC NEGATIVE: 1
NEUROLOGICAL NEGATIVE: 1
EYES NEGATIVE: 1
GASTROINTESTINAL NEGATIVE: 1

## 2025-04-10 ASSESSMENT — ACTIVITIES OF DAILY LIVING (ADL)
HEARING - LEFT EAR: FUNCTIONAL
GROOMING: INDEPENDENT
LACK_OF_TRANSPORTATION: NO
HEARING - RIGHT EAR: FUNCTIONAL
FEEDING YOURSELF: INDEPENDENT
PATIENT'S MEMORY ADEQUATE TO SAFELY COMPLETE DAILY ACTIVITIES?: YES
JUDGMENT_ADEQUATE_SAFELY_COMPLETE_DAILY_ACTIVITIES: YES
ADL_ASSISTANCE: INDEPENDENT
ADL_ASSISTANCE: INDEPENDENT
TOILETING: INDEPENDENT
WALKS IN HOME: NEEDS ASSISTANCE
BATHING: NEEDS ASSISTANCE
ADEQUATE_TO_COMPLETE_ADL: YES
BATHING_ASSISTANCE: MINIMAL
DRESSING YOURSELF: NEEDS ASSISTANCE
LACK_OF_TRANSPORTATION: NO

## 2025-04-10 ASSESSMENT — PAIN SCALES - GENERAL
PAINLEVEL_OUTOF10: 7
PAINLEVEL_OUTOF10: 7
PAINLEVEL_OUTOF10: 3
PAINLEVEL_OUTOF10: 5 - MODERATE PAIN
PAINLEVEL_OUTOF10: 0 - NO PAIN
PAINLEVEL_OUTOF10: 4
PAINLEVEL_OUTOF10: 7
PAINLEVEL_OUTOF10: 7
PAINLEVEL_OUTOF10: 6
PAINLEVEL_OUTOF10: 7
PAINLEVEL_OUTOF10: 6
PAINLEVEL_OUTOF10: 7
PAINLEVEL_OUTOF10: 5 - MODERATE PAIN
PAINLEVEL_OUTOF10: 7
PAINLEVEL_OUTOF10: 6
PAINLEVEL_OUTOF10: 8

## 2025-04-10 ASSESSMENT — COGNITIVE AND FUNCTIONAL STATUS - GENERAL
PATIENT BASELINE BEDBOUND: NO
MOVING FROM LYING ON BACK TO SITTING ON SIDE OF FLAT BED WITH BEDRAILS: A LITTLE
HELP NEEDED FOR BATHING: A LITTLE
MOBILITY SCORE: 17
DRESSING REGULAR UPPER BODY CLOTHING: A LITTLE
DRESSING REGULAR LOWER BODY CLOTHING: A LITTLE
STANDING UP FROM CHAIR USING ARMS: A LITTLE
MOBILITY SCORE: 21
WALKING IN HOSPITAL ROOM: A LITTLE
PERSONAL GROOMING: A LITTLE
CLIMB 3 TO 5 STEPS WITH RAILING: A LITTLE
MOVING FROM LYING ON BACK TO SITTING ON SIDE OF FLAT BED WITH BEDRAILS: A LITTLE
DRESSING REGULAR LOWER BODY CLOTHING: A LITTLE
DRESSING REGULAR LOWER BODY CLOTHING: A LITTLE
MOBILITY SCORE: 18
DAILY ACTIVITIY SCORE: 20
STANDING UP FROM CHAIR USING ARMS: A LITTLE
CLIMB 3 TO 5 STEPS WITH RAILING: A LITTLE
WALKING IN HOSPITAL ROOM: A LITTLE
STANDING UP FROM CHAIR USING ARMS: A LITTLE
MOVING TO AND FROM BED TO CHAIR: A LITTLE
HELP NEEDED FOR BATHING: A LITTLE
DAILY ACTIVITIY SCORE: 19
TOILETING: A LITTLE
DAILY ACTIVITIY SCORE: 20
TURNING FROM BACK TO SIDE WHILE IN FLAT BAD: A LITTLE
MOVING TO AND FROM BED TO CHAIR: A LITTLE
CLIMB 3 TO 5 STEPS WITH RAILING: A LOT
TOILETING: A LITTLE
TURNING FROM BACK TO SIDE WHILE IN FLAT BAD: A LITTLE
WALKING IN HOSPITAL ROOM: A LITTLE
DRESSING REGULAR UPPER BODY CLOTHING: A LITTLE
HELP NEEDED FOR BATHING: A LITTLE
TOILETING: A LITTLE
DRESSING REGULAR UPPER BODY CLOTHING: A LITTLE

## 2025-04-10 ASSESSMENT — COLUMBIA-SUICIDE SEVERITY RATING SCALE - C-SSRS
6. HAVE YOU EVER DONE ANYTHING, STARTED TO DO ANYTHING, OR PREPARED TO DO ANYTHING TO END YOUR LIFE?: NO
1. IN THE PAST MONTH, HAVE YOU WISHED YOU WERE DEAD OR WISHED YOU COULD GO TO SLEEP AND NOT WAKE UP?: NO
2. HAVE YOU ACTUALLY HAD ANY THOUGHTS OF KILLING YOURSELF?: NO
1. IN THE PAST MONTH, HAVE YOU WISHED YOU WERE DEAD OR WISHED YOU COULD GO TO SLEEP AND NOT WAKE UP?: NO
2. HAVE YOU ACTUALLY HAD ANY THOUGHTS OF KILLING YOURSELF?: NO
6. HAVE YOU EVER DONE ANYTHING, STARTED TO DO ANYTHING, OR PREPARED TO DO ANYTHING TO END YOUR LIFE?: NO

## 2025-04-10 ASSESSMENT — PAIN DESCRIPTION - ORIENTATION
ORIENTATION: LEFT;LOWER
ORIENTATION: LOWER
ORIENTATION: LEFT;LOWER

## 2025-04-10 ASSESSMENT — LIFESTYLE VARIABLES
SKIP TO QUESTIONS 9-10: 1
HOW OFTEN DO YOU HAVE A DRINK CONTAINING ALCOHOL: NEVER
AUDIT-C TOTAL SCORE: 0
PRESCIPTION_ABUSE_PAST_12_MONTHS: NO
AUDIT-C TOTAL SCORE: 0
SUBSTANCE_ABUSE_PAST_12_MONTHS: YES
HOW OFTEN DO YOU HAVE 6 OR MORE DRINKS ON ONE OCCASION: NEVER
HOW MANY STANDARD DRINKS CONTAINING ALCOHOL DO YOU HAVE ON A TYPICAL DAY: PATIENT DOES NOT DRINK

## 2025-04-10 ASSESSMENT — PAIN DESCRIPTION - DESCRIPTORS: DESCRIPTORS: ACHING

## 2025-04-10 ASSESSMENT — PAIN DESCRIPTION - LOCATION
LOCATION: BACK

## 2025-04-10 ASSESSMENT — PAIN SCALES - PAIN ASSESSMENT IN ADVANCED DEMENTIA (PAINAD)
TOTALSCORE: MEDICATION (SEE MAR)
TOTALSCORE: MEDICATION (SEE MAR)

## 2025-04-10 ASSESSMENT — PATIENT HEALTH QUESTIONNAIRE - PHQ9
2. FEELING DOWN, DEPRESSED OR HOPELESS: NOT AT ALL
SUM OF ALL RESPONSES TO PHQ9 QUESTIONS 1 & 2: 0
1. LITTLE INTEREST OR PLEASURE IN DOING THINGS: NOT AT ALL

## 2025-04-10 NOTE — ANESTHESIA PROCEDURE NOTES
Airway  Date/Time: 4/10/2025 7:52 AM  Urgency: elective    Airway not difficult    Staffing  Performed: CRNA   Authorized by: Av Portillo DO    Performed by: DEA Corea-CRNA  Patient location during procedure: OR    Indications and Patient Condition  Indications for airway management: anesthesia  Spontaneous ventilation: present  Sedation level: deep  Preoxygenated: yes  Patient position: sniffing  Mask difficulty assessment: 0 - not attempted    Final Airway Details  Final airway type: endotracheal airway      Successful airway: ETT  Cuffed: yes   Successful intubation technique: direct laryngoscopy  Facilitating devices/methods: intubating stylet  Endotracheal tube insertion site: oral  Blade: Gonzalez  Blade size: #2  ETT size (mm): 7.0  Cormack-Lehane Classification: grade I - full view of glottis  Placement verified by: chest auscultation and capnometry   Measured from: lips  ETT to lips (cm): 21  Number of attempts at approach: 1

## 2025-04-10 NOTE — ANESTHESIA PREPROCEDURE EVALUATION
Patient: Suly Vance    Procedure Information       Date/Time: 04/10/25 0730    Procedure: Lumbar 3-4 Lateral Lumbar Fusion (Spine Lumbar) - C-ARM, nuvasive    [facility]: [facility]    Surgeons: Jaron Enriquez MD            Relevant Problems   Cardiac   (+) HTN (hypertension)   (+) Hyperlipidemia      Neuro   (+) Depression with anxiety   (+) Lumbar radiculopathy      GI   (+) Dysphagia      Musculoskeletal   (+) Chronic low back pain   (+) Degeneration of lumbar intervertebral disc   (+) Spinal stenosis of lumbar region at multiple levels       Clinical information reviewed:   Tobacco  Allergies  Meds  Problems  Med Hx  Surg Hx  OB Status    Fam Hx  Soc Hx        NPO Detail:  NPO/Void Status  Carbohydrate Drink Given Prior to Surgery? : N  Date of Last Liquid: 04/10/25  Time of Last Liquid: 0430  Date of Last Solid: 04/09/25  Time of Last Solid: 1800  Last Intake Type: Clear fluids  Time of Last Void: 0530         Physical Exam    Airway  Mallampati: II  TM distance: >3 FB     Cardiovascular    Dental    Pulmonary    Abdominal        Anesthesia Plan    History of general anesthesia?: yes  History of complications of general anesthesia?: no    ASA 2     general     intravenous induction   Anesthetic plan and risks discussed with patient.

## 2025-04-10 NOTE — PROGRESS NOTES
04/10/25 1329   Discharge Planning   Living Arrangements Spouse/significant other;Children   Support Systems Spouse/significant other;Children   Assistance Needed Patient reports she is independent of ADLs and IADLs.   Type of Residence Private residence   Do you have animals or pets at home? Yes   Type of Animals or Pets 2 dogs, 3 cats   Who is requesting discharge planning? Provider   Home or Post Acute Services None   Expected Discharge Disposition Home   Does the patient need discharge transport arranged? No   Financial Resource Strain   How hard is it for you to pay for the very basics like food, housing, medical care, and heating? Not hard   Housing Stability   In the last 12 months, was there a time when you were not able to pay the mortgage or rent on time? N   In the past 12 months, how many times have you moved where you were living? 0   At any time in the past 12 months, were you homeless or living in a shelter (including now)? N   Transportation Needs   In the past 12 months, has lack of transportation kept you from medical appointments or from getting medications? no   In the past 12 months, has lack of transportation kept you from meetings, work, or from getting things needed for daily living? No   Stroke Family Assessment   Stroke Family Assessment Needed No   Intensity of Service   Intensity of Service 0-30 min     MSW met with patient at bedside. She reports she is independent at home. Patient denies any home going needs or concerns at this time reporting she is getting up and moving around well.    Patient plans to return home upon discharge.

## 2025-04-10 NOTE — PROGRESS NOTES
Occupational Therapy    Evaluation    Patient Name: Suly Vance  MRN: [id number]  Department: [address]  Room: [address] [place], [place] [address]  Today's Date: 4/10/2025  Time Calculation  Start Time: 1335  Stop Time: 1350  Time Calculation (min): 15 min    Assessment  IP OT Assessment  OT Assessment: OT orders received, Chart reviewed, OT evaluation completed. Patient is a 49 YOF, s/p lumbar 3-4 lateral fusion presenting with decreased strength, balance and endurance post op. Patient is currently requiring CGA-Reny for ADLs and mobility tasks. Patient to benefit from skilled OT to maximize return to PLOF of IND with ADLs, transfers and functional mobility.  Prognosis: Good  Barriers to Discharge Home: Caregiver assistance  Caregiver Assistance: Caregiver assistance needed per identified barriers - however, level of patient's required assistance exceeds assistance available at home  Evaluation/Treatment Tolerance: Patient limited by pain  Medical Staff Made Aware: Yes  End of Session Communication: Bedside nurse  End of Session Patient Position: Bed, 3 rail up, Alarm on  Plan:  Treatment Interventions: ADL retraining, Functional transfer training, UE strengthening/ROM, Endurance training, Patient/family training, Neuromuscular reeducation  OT Frequency: 3 times per week  OT Discharge Recommendations: Low intensity level of continued care  Equipment Recommended upon Discharge: Wheeled walker  OT Recommended Transfer Status: Assist of 1  OT - OK to Discharge: Yes    Subjective   Current Problem:  1. Neurogenic claudication due to lumbar spinal stenosis  acetaminophen (Tylenol Extra Strength) 500 mg tablet    polyethylene glycol (Miralax) 17 gram/dose powder    oxyCODONE (Roxicodone) 5 mg immediate release tablet    methocarbamol (Robaxin) 500 mg tablet      2. Pre-op testing        3. Lumbar radiculopathy  acetaminophen (Tylenol Extra Strength) 500 mg tablet    polyethylene glycol (Miralax) 17 gram/dose powder    oxyCODONE (Roxicodone) 5 mg  immediate release tablet    methocarbamol (Robaxin) 500 mg tablet        General:  General  Reason for Referral: impaired activities of daily living d/t neurogenic claudication due to lumbar spinal stenosis s/p lumbar 3-4 lateral fusion.  Referred By: Jaron Enriquez MD  Past Medical History Relevant to Rehab: chronic LBP, HTN, vitamin D deficiency, depression with anxiety.  Family/Caregiver Present: No  Co-Treatment: PT  Co-Treatment Reason: to maximize patient safety and outcomes.  Prior to Session Communication: Bedside nurse  Patient Position Received: Up in chair, Alarm on  Preferred Learning Style: verbal, visual  General Comment: RN cleared nursing for therapy, patient seen sitting in chair in room with nursing aid present, agreeable for OT eval  Precautions:  Hearing/Visual Limitations: hearing and vision WNL- wears glasses  Medical Precautions: Fall precautions  Post-Surgical Precautions: Spinal precautions  Precautions Comment: +IV           Pain:  Pain Assessment  Pain Assessment: 0-10  0-10 (Numeric) Pain Score: 7  Pain Type: Acute pain, Chronic pain  Pain Location: Back  Pain Orientation: Lower (and R hip)  Pain Interventions: Repositioned  Response to Interventions: Decrease in pain (5-6/10)    Objective   Cognition:  Overall Cognitive Status: Within Functional Limits  Insight: Mild  Impulsive: Mildly           Home Living:  Type of Home: House  Lives With: Spouse, Adult children  Home Adaptive Equipment: Walker rolling or standard, Cane (has a cane and a rollator)  Home Layout: Multi-level, Able to live on main level with bedroom/bathroom  Home Access: Stairs to enter without rails  Entrance Stairs-Rails: None  Entrance Stairs-Number of Steps: 2  Bathroom Shower/Tub: Tub/shower unit  Bathroom Toilet: Standard  Bathroom Equipment: None   Prior Function:  Level of [place]: Independent with ADLs and functional transfers, Independent with homemaking with ambulation  ADL Assistance:  Independent  Homemaking Assistance: Independent  Ambulatory Assistance: Independent  Vocational:  (working full time vs.part time.)       ADL:  Eating Assistance: Independent  Eating Deficit: None  Grooming Assistance: Stand by  Grooming Deficit: Supervision/safety  Bathing Assistance: Minimal  Bathing Deficit:  (anticipated secondary to spinal precautions and thoroughness)  UE Dressing Assistance: Stand by  UE Dressing Deficit: Supervision/safety  LE Dressing Assistance: Stand by  LE Dressing Deficit: Supervision/safety (pt able to don/doff socks via figure 4 technique)  Toileting Assistance with Device: Stand by  Toileting Deficit: Supervison/safety (CGA)  Activity Tolerance:     Bed Mobility/Transfers: Bed Mobility  Bed Mobility: Yes  Bed Mobility 1  Bed Mobility 1: Sitting to supine  Level of Assistance 1: Close supervision  Bed Mobility Comments 1: eob>supine while maintaining spinal precautions.    Transfers  Transfer: Yes  Transfer 1  Transfer From 1: Chair with arms to  Transfer to 1: Stand  Technique 1: Sit to stand  Transfer Level of Assistance 1: Contact guard  Transfers 2  Transfer From 2: Bed to, Stand to  Transfer to 2: Stand, Bed  Technique 2: Stand to sit, Sit to stand  Transfer Level of Assistance 2: Contact guard      Functional Mobility:  Functional Mobility  Functional Mobility Performed: Yes  Functional Mobility 1  Surface 1: Level tile  Device 1: IV Pole  Assistance 1: Contact guard  Comments 1: Completed functional mobility in room with CGA with no LOB.  Modalities:       Sensation:  Sensation Comment: denies parasthesia BUEs  Strength:  Strength Comments: BUE strength WFL       Coordination:  Movements are Fluid and Coordinated: Yes  Coordination Comment: BUE coordination appears WFL   Hand Function:  Hand Function  Gross Grasp: Functional  Coordination: Functional  Extremities: RUE   RUE : Within Functional Limits and LUE   LUE: Within Functional Limits    Outcome Measures: [facility] Daily  Activity  Putting on and taking off regular lower body clothing: A little  Bathing (including washing, rinsing, drying): A little  Putting on and taking off regular upper body clothing: A little  Toileting, which includes using toilet, bedpan or urinal: A little  Taking care of personal grooming such as brushing teeth: A little  Eating Meals: None  Daily Activity - Total Score: 19      Education Documentation  ADL Training, taught by Rubi June OT at 4/10/2025  2:30 PM.  Learner: Patient  Readiness: Eager  Method: Explanation  Response: Verbalizes Understanding  Comment: Educated on how to maintain spinal precautions while completing ADLs    Education Comments  Educated pt on call light use, fall precautions, and OT POC      Goals:   Encounter Problems       Encounter Problems (Active)       OT Goals       ADLs       Start:  04/10/25    Expected End:  04/25/25       Patient will completed ADLs with MATILDE utilizing AE PRN, while maintaining spinal precautions.         Functional mobility       Start:  04/10/25    Expected End:  04/25/25       Patient will complete functional mobility and functional transfers with MATILDE utilizing least restrictive device with while maintaining spinal precautions.         Activity tolerance       Start:  04/10/25    Expected End:  04/25/25       Patient will demonstrate the ability to participate in functional activity at least >/= 20 minutes in order to increase patient's safety and independence with daily tasks.

## 2025-04-10 NOTE — OP NOTE
Lumbar 3-4 Lateral Lumbar Fusion Operative Note     Date: 4/10/2025  OR Location: [facility]    Name: Suly Vance, : 1975, Age: 49 y.o., MRN: [id number], Sex: female    Diagnosis  Pre-op Diagnosis      * Neurogenic claudication due to lumbar spinal stenosis [M48.062]     * Lumbar radiculopathy [M54.16] Post-op Diagnosis     * Neurogenic claudication due to lumbar spinal stenosis [M48.062]     * Lumbar radiculopathy [M54.16]     Procedures  Lumbar 3-4 Lateral Lumbar Fusion   - [place] ARTHRD ANT INTERBODY MIN DSC LUMBAR    N[place] INSJ BIOMCHN DEV INTERVERTEBRAL DSC SPC W/ARTHRD []  [place] ANTERIOR INSTRUMENTATION 2-3 VERTEBRAL SEGMENTS []  [place] ALLOGRAFT FOR SPINE SURGERY ONLY MORSELIZED []  Surgeons      * Jaron Enriquez - Primary    Resident/Fellow/Other Assistant:  Surgeons and Role:  * No surgeons found with a matching role *    Staff:   Circulator: Lázaro Campa Person: Erick    Anesthesia Staff: Anesthesiologist: Av Portillo DO  CRNA: DEA Corea-CRNA    Procedure Summary  Anesthesia: General  ASA: II  Estimated Blood Loss: 25mL  Intra-op Medications:   Administrations occurring from 730 to 0945 on 04/10/25:   Medication Name Total Dose   dexAMETHasone (Decadron) 10 mg/mL 10 mg   dexmedeTOMIDine (Precedex) 100 mcg/mL 2 mL single dose vial 6 mcg   fentaNYL (Sublimaze) injection 50 mcg/mL 100 mcg   ketorolac (Toradol) 30 mg 30 mg   LR infusion Cannot be calculated   lidocaine (Xylocaine) injection 2 % 100 mg   methocarbamol (Robaxin) 100 mg/mL 1,000 mg   midazolam (Versed) injection 1 mg/mL 2 mg   ondansetron 2 mg/mL 4 mg   phenylephrine 40 mcg/mL syringe 10 mL 200 mcg   propofol (Diprivan) injection 10 mg/mL 200 mg   remifentanil (Ultiva) 1,000 mcg in sodium chloride 0.9% 50 mL (20 mcg/mL) infusion 0.06 mg   succinylcholine 100 mg/5 mL syringe 160 mg   tranexamic acid 1,000 mg/100 mL NS (premix) 1,000 mg   ceFAZolin (Ancef) 2 g in dextrose (iso)  mL 2 g              Anesthesia  Record               Intraprocedure I/O Totals          Intake    Remifentanil Drip 0.00 mL    The total shown is the total volume documented since Anesthesia Start was filed.    Tranexamic Acid 0.00 mL    The total shown is the total volume documented since Anesthesia Start was filed.    ceFAZolin (Ancef) 2 g in dextrose (iso)  mL 100.00 mL    Total Intake 100 mL          Specimen: No specimens collected              Drains and/or Catheters: * None in log *    Tourniquet Times:         Implants:  Implants       Type Name Action Serial No.      Graft BONE GRAFT PUTTY OSSIFUSE FLOWABLE FIBER 5CC - [id number] - [id number] Implanted [id number]     Spinal Hardware SPACER, MODULUS XLW, [id number], 10 DEG - [id number] Implanted      Plate 14MM PLATE NUVASIVE Implanted       40MM SCREW NUVASIVE Implanted       45MM NUVASIVE SCREW Implanted               Findings: Disc space collapse with retrolisthesis at L3-4, excellent restoration of coronal and sagittal alignment following interbody fusion.    Indications: Suly Vance is an 49 y.o. female who is having surgery for Neurogenic claudication due to lumbar spinal stenosis [M48.062]  Lumbar radiculopathy [M54.16].  Patient presented with lumbar radiculopathy and neurogenic claudication.  She has remote history of L4-5 laminectomy and fusion after which she did very well.  She failed conservative treatment.  X-rays and MRI demonstrated disc space collapse with retrolisthesis at L3-4, moderate to severe central and lateral recess stenosis.    The patient was seen in the preoperative area. The risks, benefits, complications, treatment options, non-operative alternatives, expected recovery and outcomes were discussed with the patient. The possibilities of reaction to medication, pulmonary aspiration, injury to surrounding structures, bleeding, recurrent infection, the need for additional procedures, failure to diagnose a condition, and creating a complication requiring  transfusion or operation were discussed with the patient. The patient concurred with the proposed plan, giving informed consent.  The site of surgery was properly noted/marked if necessary per policy. The patient has been actively warmed in preoperative area. Preoperative antibiotics have been ordered and given within 1 hours of incision. Venous thrombosis prophylaxis have been ordered including bilateral sequential compression devices    Procedure Details:   Patient was brought back to the operating room and general anesthesia was induced.  Leads for neural monitoring using directionally stimulated EMGs were placed in the lower extremities.  Patient was positioned in the lateral decubitus position with the right side up.  The bed was broken slightly.  Grounding electrodes were applied and a twitch test was performed.  Fluoroscopy was brought in and the bed was rotated to obtain perfect AP and lateral images.  The disc space was marked on the patient's skin using fluoroscopy.  The lateral spine was then prepped and draped in the usual sterile fashion.  A time-out was performed and preoperative antibiotics were given    Small incision was made just posterior to the planned incision for retroperitoneal access.  The abdominal wall was identified and dissected bluntly in line with its fibers.  Retroperitoneal contents including psoas muscle, inner table of the ilium, and undersurface of the 12th rib were palpated.  All retroperitoneal contents were bluntly swept off the lateral spine.  The incision was then made directly over the disc space.  Again the abdominal wall was dissected bluntly in line with its fibers.  The initial dilator was walked down to the lateral psoas muscle using my finger through the counter incision.  Localization was performed arthroscopy, the dilator was then passed through the psoas muscle under EMG stimulation.  The dilator was affixed to the lateral disc using a guidewire.  Sequential dilation  was performed, all EMG thresholds localized the lumbar plexus posterior to the dilators and a safe distance away.  The Maxcess retractor was then applied over the dilators and affixed to the table mounted arm.  The dilators removed, free running EMG probe was passed through the operative field and no neurologic structures were identified.  The discs patient was then passed on the posterior blade increase the retractor to the disc space.  Retractor positioning was verified under lateral fluoroscopy, the retractor was then opened primarily in the anterior to posterior direction.    An annulotomy was performed with a 15 blade. A Blanca elevator was passed across the disc space to release the contralateral annulus. A thorough diskectomy was performed using a series of pituitary rongeurs and angled curettes.  Endplates were prepared using ring curettes.  Trial sizers were placed and a size 10 x 22 x 50 mm cage was filled with Osteocel bone graft and then passed across the disc space under fluoroscopic visualization.  Position was verified in AP and lateral planes, the  handle was then removed.  Hemostasis was obtained with FloSeal and bipolar electrocautery.    Lateral decade plate was then placed in a standard fashion.  Positioning was verified with fluoroscopy.  EMG probe was placed into the screw holes and no neurologic structures were identified.  The awl was passed across the adjacent endplates under fluoroscopic guidance.  6.5 millimeter screws were then placed and the final locking mechanism on the screws were tightened.  The plate was finally tightened using the torque wrench.    Final AP and lateral images were obtained verifying correct positioning and correct operative levels.    All wounds were irrigated.  They were closed in layers.  Dermabond Prineo and dry sterile dressings were then applied.  The patient was transferred off the OR table and awakened without difficulty.  There is no complication  during the case.  I was present and scrubbed for the entire operation.    Complications:  None; patient tolerated the procedure well.    Disposition: PACU - hemodynamically stable.  Condition: stable                 Additional Details:     Attending Attestation: I was present for the entire procedure.    Jaron Enriquez  Phone Number: [phone]

## 2025-04-10 NOTE — DISCHARGE INSTRUCTIONS
Activity  Progressively walk 2 times a day.  Weight bearing as tolerated.  No pushing, pulling, or lifting objects greater than 10 pounds.  No excessive bending, twisting. No heavy house or yard work.  You may shower when there is no drainage from the incision site for 48 hours.  You may not return to school/work until your follow up appointment.  You may not drive until your follow up appointment or while taking narcotic medication.    Wound Type: Surgical Incision  -Change the dressing daily and continue until the incision is no longer draining.   -Once the incision  has been dry for 48 hours, you may leave the dressing off and the site open to air.  -Cover the wound with an abdominal pad and secure it with paper tape around the edges.  -If there is a Prineo/Exofin dressing (mesh strip) over your incision, do not remove it with your     dressing changes. The dressing will slowly lift away from the skin over time.   -No Lotions or Creams on or around the incision site.  -No tub soaks  -You may use heat or ice to your incision sites and or back as needed for comfort.    Call the office if:  You are breathing faster than normal.  Fever of 100.4 F or higher.  Chills  Urinating less than 4 times per day.  Acting very sleepy and difficult to awaken.  Vomiting and not able to eat or drink for 12 hours  3 or more loose, watery bowel movements in 24 hours (Diarrhea)  Concerns over the appearance of your incision.    Return immediately to the ER:  Persistent bilateral leg pain and or numbness >24 hours.  Perineal numbness/sensory loss  Urinary retention >12 hours  Loss of bowel/ bladder control    
[Anxiety] : anxiety

## 2025-04-10 NOTE — ASSESSMENT & PLAN NOTE
S/P L3-4 lateral lumbar fusion 4/10  Pain management per primary service   IS   PT/OT   DVT prophylaxis per primary service

## 2025-04-10 NOTE — CARE PLAN
Problem: Fall/Injury  Goal: Not fall by end of shift  Outcome: Progressing  Goal: Be free from injury by end of the shift  Outcome: Progressing  Goal: Verbalize understanding of personal risk factors for fall in the hospital  Outcome: Progressing  Goal: Verbalize understanding of risk factor reduction measures to prevent injury from fall in the home  Outcome: Progressing  Goal: Use assistive devices by end of the shift  Outcome: Progressing  Goal: Pace activities to prevent fatigue by end of the shift  Outcome: Progressing     Problem: Pain  Goal: Takes deep breaths with improved pain control throughout the shift  Outcome: Progressing  Goal: Turns in bed with improved pain control throughout the shift  Outcome: Progressing  Goal: Walks with improved pain control throughout the shift  Outcome: Progressing  Goal: Performs ADL's with improved pain control throughout shift  Outcome: Progressing  Goal: Participates in PT with improved pain control throughout the shift  Outcome: Progressing  Goal: Free from opioid side effects throughout the shift  Outcome: Progressing  Goal: Free from acute confusion related to pain meds throughout the shift  Outcome: Progressing     Problem: Pain - Adult  Goal: Verbalizes/displays adequate comfort level or baseline comfort level  Outcome: Progressing     Problem: Safety - Adult  Goal: Free from fall injury  Outcome: Progressing     Problem: Discharge Planning  Goal: Discharge to home or other facility with appropriate resources  Outcome: Progressing     Problem: Discharge Planning  Goal: Discharge to home or other facility with appropriate resources  Outcome: Progressing     Problem: Chronic Conditions and Co-morbidities  Goal: Patient's chronic conditions and co-morbidity symptoms are monitored and maintained or improved  Outcome: Progressing     Problem: Nutrition  Goal: Nutrient intake appropriate for maintaining nutritional needs  Outcome: Progressing   The patient's goals for the  shift include pain management    The clinical goals for the shift include manage pain, mobility    Over the shift, the patient did not make progress toward the following goals. Barriers to progression include na. Recommendations to address these barriers include na.

## 2025-04-10 NOTE — ANESTHESIA POSTPROCEDURE EVALUATION
Patient: Suly Vance    Procedure Summary       Date: 04/10/25 Room / Location: [facility]  / [facility]    Anesthesia Start: 0737 Anesthesia Stop: 0902    Procedure: Lumbar 3-4 Lateral Lumbar Fusion (Spine Lumbar) Diagnosis:       Neurogenic claudication due to lumbar spinal stenosis      Lumbar radiculopathy      (Neurogenic claudication due to lumbar spinal stenosis [M48.062])      (Lumbar radiculopathy [M54.16])    Surgeons: Jaron Enriquez MD Responsible Provider: Av Portillo DO    Anesthesia Type: general ASA Status: 2            Anesthesia Type: general    Vitals Value Taken Time   /70 04/10/25 0859   Temp 36.4 °C (97.5 °F) 04/10/25 0859   Pulse 72 04/10/25 0859   Resp 22 04/10/25 0859   SpO2 100 % 04/10/25 0859       Anesthesia Post Evaluation    Patient location during evaluation: bedside  Patient participation: complete - patient participated  Level of consciousness: awake  Pain management: adequate  Airway patency: patent  Cardiovascular status: acceptable  Respiratory status: acceptable  Hydration status: acceptable  Postoperative Nausea and Vomiting: none    There were no known notable events for this encounter.

## 2025-04-10 NOTE — CONSULTS
Consults    Reason For Consult  Anxiety/depression, HTN     History Of Present Illness  Suly Vance is a 49 y.o. female presenting with back pain. S/P L3-4 lateral fusion. Currently resting in bed. Pain controlled with current regimen. Denies chest pain, shortness of breath, abdominal pain, fevers, chills, nausea.      Past Medical History  She has a past medical history of Anxiety, Arthritis, Chronic pain disorder, Fractures, GERD (gastroesophageal reflux disease), Headache, Hyperlipidemia, Hypertension, Joint pain, Lumbar disc disease, Neuromuscular disorder (Multi), Pneumonia (), Seizure (Multi) (), and Spinal stenosis.    Surgical History  She has a past surgical history that includes  section, low transverse (); Spinal fusion; Lumbar fusion; Tonsillectomy; Lumbar spine surgery (); and Other surgical history ().     Social History  She reports that she has been smoking cigarettes. She started smoking about 35 years ago. She has a 17.6 pack-year smoking history. She has been exposed to tobacco smoke. She has never used smokeless tobacco. She reports current drug use. Frequency: 7.00 times per week. Drug: Marijuana. She reports that she does not drink alcohol.    Family History  Family History   Problem Relation Name Age of Onset    Cancer Mother [place] vasu     Stroke Mother Emily vasu     Hypertension Mother Emily vasu     Cervical cancer Mother [place] vasu     Mental illness Mother [place] vasu     Arthritis Mother Emily vasu     Depression Mother [place] vasu     Hypertension Father Jasvir [place]     Arthritis Father Jasvir [place]     Diabetes Father Jasvir [place]     Cancer Sister      Cervical cancer Sister      Heart disease Maternal Grandmother      Cancer Paternal Grandmother Kathryn Harrigill     Breast cancer Paternal Grandmother Kathryn Harrigill         metastatic    Parkinsonism Paternal Grandfather          Allergies  Pregabalin, Duloxetine, Other,  Pseudoephedrine, Topiramate, Zofran [ondansetron hcl], Nicotine, and Nsaids (non-steroidal anti-inflammatory drug)    Review of Systems   Constitutional: Negative.    HENT: Negative.     Eyes: Negative.    Respiratory: Negative.     Cardiovascular: Negative.    Gastrointestinal: Negative.    Endocrine: Negative.    Genitourinary: Negative.    Musculoskeletal:         Back pain   Left leg pain    Skin: Negative.    Neurological: Negative.    Psychiatric/Behavioral: Negative.          Physical Exam  Constitutional:       Appearance: Normal appearance.   HENT:      Head: Normocephalic and atraumatic.      Mouth/Throat:      Mouth: Mucous membranes are moist.      Pharynx: Oropharynx is clear.   Eyes:      Extraocular Movements: Extraocular movements intact.      Conjunctiva/sclera: Conjunctivae normal.      Pupils: Pupils are equal, round, and reactive to light.   Cardiovascular:      Rate and Rhythm: Normal rate and regular rhythm.      Pulses: Normal pulses.      Heart sounds: Normal heart sounds.   Pulmonary:      Effort: Pulmonary effort is normal.      Breath sounds: Normal breath sounds.   Abdominal:      General: Bowel sounds are normal.      Palpations: Abdomen is soft.      Tenderness: There is no abdominal tenderness.   Musculoskeletal:         General: Normal range of motion.      Cervical back: Normal range of motion and neck supple.   Skin:     General: Skin is warm and dry.      Capillary Refill: Capillary refill takes less than 2 seconds.   Neurological:      General: No focal deficit present.      Mental Status: She is alert and oriented to person, place, and time.   Psychiatric:         Mood and Affect: Mood normal.         Behavior: Behavior normal.          Last Recorded Vitals  /65 (BP Location: Right arm, Patient Position: Lying)   Pulse 70   Temp 36.6 °C (97.9 °F) (Temporal)   Resp 16   Wt 47.2 kg (104 lb)   SpO2 98%     Relevant Results  Results for orders placed or performed during  the hospital encounter of 04/10/25 (from the past 24 hours)   POCT pregnancy, urine   Result Value Ref Range    Preg Test, Ur Negative Negative          Assessment/Plan     Neurogenic claudication due to lumbar spinal stenosis  S/P L3-4 lateral lumbar fusion 4/10  Pain management per primary service   IS   PT/OT   DVT prophylaxis per primary service     Lumbar radiculopathy  See above       Depression with anxiety  Continue Effexor at home dose   Continue Trazodone at home dose     HTN (hypertension)  No current outpatient treatment   Vital signs as ordered   Check AM labs     Tobacco use disorder  Declines nicotine replacement at this time     DVT prophylaxis   SCDs     Tammy Li, APRN-CNP

## 2025-04-10 NOTE — PROGRESS NOTES
Physical Therapy    Physical Therapy Evaluation    Patient Name: Suly Vance  MRN: [id number]  Department: [address]  Room: [address] [place], [place] [address]  Today's Date: 4/10/2025   Time Calculation  Start Time: 1336  Stop Time: 1352  Time Calculation (min): 16 min    Assessment/Plan   PT Assessment  PT Assessment Results: Decreased strength, Decreased endurance, Impaired balance, Decreased mobility, Decreased coordination, Decreased safety awareness, Impaired judgement, Orthopedic restrictions, Pain, Impaired sensation  Rehab Prognosis: Good  Barriers to Discharge Home: Physical needs  Physical Needs: Intermittent mobility assistance needed  Evaluation/Treatment Tolerance: Patient limited by pain  Medical Staff Made Aware: Yes  End of Session Communication: Bedside nurse  End of Session Patient Position: Bed, 3 rail up, Alarm on      Assessment Comment: 48 y/o female who is s/p lumbar surgery with Dr. Enriquez. Pt reportedly indep at baseline. Is currently limited in mobility due to post-op pain, parasthesias, generalized weakness and decreased activity tolerance. Would benefit from cont PT services to maximize safe, indep mobility.      IP OR SWING BED PT PLAN  Inpatient or Swing Bed: Inpatient  PT Plan  Treatment/Interventions: Bed mobility, Transfer training, Gait training, Balance training, Neuromuscular re-education, Strengthening, Endurance training, Therapeutic exercise, Therapeutic activity, Positioning, Postural re-education  PT Plan: Ongoing PT  PT Frequency: Daily  PT Discharge Recommendations: Low intensity level of continued care  Equipment Recommended upon Discharge: Wheeled walker  PT Recommended Transfer Status: Assist x1  PT - OK to Discharge: Yes    Subjective   General Visit Information:  General  Reason for Referral: impaired mobility; recent surgery, L3-4 lateral lumbar fusion  Referred By: Jaron Enriquez MD  Past Medical History Relevant to Rehab: chronic LBP with radiculopathy, HTN, vitamin D deficiency,  depression with anxiety.  Family/Caregiver Present: No  Co-Treatment: OT  Co-Treatment Reason: safe mobility POD zero  Prior to Session Communication: Bedside nurse  Patient Position Received: Up in chair, Alarm on  Preferred Learning Style: verbal, visual  General Comment: 48 y/o female s/p lumbar sugery. Cleared for mobility per nursing. Pt sitting up in chair upon arrival. PIV  Home Living:  Home Living  Type of Home: House  Lives With: Spouse, Adult children  Home Adaptive Equipment: Walker rolling or standard, Cane (has a cane and a rollator)  Home Layout: Multi-level, Able to live on main level with bedroom/bathroom  Home Access: Stairs to enter without rails  Entrance Stairs-Number of Steps: 2 MARISEL without handrail  Bathroom Shower/Tub: Tub/shower unit  Bathroom Toilet: Standard  Bathroom Equipment: None  Prior Level of Function:  Prior Function Per Pt/Caregiver Report  Level of [place]: Independent with ADLs and functional transfers, Independent with homemaking with ambulation  ADL Assistance: Independent  Homemaking Assistance: Independent  Ambulatory Assistance: Independent  Vocational:  (working full time vs.part time)  Prior Function Comments: Recent intermittent use of cane  Precautions:  Precautions  Medical Precautions: Fall precautions  Post-Surgical Precautions: Spinal precautions             Objective   Pain:  Pain Assessment  Pain Assessment: 0-10  0-10 (Numeric) Pain Score: 7  Pain Type: Acute pain, Surgical pain  Pain Location: Back  Pain Orientation: Lower  Pain Interventions: Repositioned  Response to Interventions: Content/relaxed  Cognition:  Cognition  Overall Cognitive Status: Within Functional Limits  Insight: Mild  Impulsive: Mildly    General Assessments:     Activity Tolerance  Endurance: Decreased tolerance for upright activites  Activity Tolerance Comments: limited by pain    Sensation  Sensation Comment: L LE numbness; pre-op    Strength  Strength Comments: B LEs > 3/5   observed. LLE limited by pain     Coordination  Movements are Fluid and Coordinated: Yes    Postural Control  Posture Comment: guarded posture    Static Sitting Balance  Static Sitting-Level of Assistance: Modified independent  Static Sitting-Comment/Number of Minutes: sitting on EOB  Dynamic Sitting Balance  Dynamic Sitting-Comments: assisted with changing pt gown, no LOB    Static Standing Balance  Static Standing-Level of Assistance: Contact guard  Static Standing-Comment/Number of Minutes: no AD  Dynamic Standing Balance  Dynamic Standing-Comments: single UE support of IV pole, supervision  Functional Assessments:  Bed Mobility 1  Bed Mobility 1: Sitting to supine  Level of Assistance 1: Close supervision  Bed Mobility Comments 1: reverse log roll technique; verbal education provided. Use of UEs    Transfer 1  Technique 1: Sit to stand, Stand to sit  Transfer Level of Assistance 1: Contact guard  Trials/Comments 1: x2 trials. First from chair with arms, second from elevated EOB    Ambulation/Gait Training  Ambulation/Gait Training Performed: Yes  Ambulation/Gait Training 1  Surface 1: Level tile  Device 1: IV Pole  Assistance 1: Contact guard  Quality of Gait 1:  (decreased amy, minimal foot clearance, slight R foot drag, antalgic gait with decreased stance time LLE)  Comments/Distance (ft) 1: about 20 ft  Extremity/Trunk Assessments:  RLE   RLE : Exceptions to WFL  Strength RLE  RLE Overall Strength: Greater than or equal to 3/5 as evidenced by functional mobility  LLE   LLE : Exceptions to WFL  Strength LLE  LLE Overall Strength: Greater than or equal to 3/5 as evidenced by functional mobility  Outcome Measures:  [facility] Basic Mobility  Turning from your back to your side while in a flat bed without using bedrails: A little  Moving from lying on your back to sitting on the side of a flat bed without using bedrails: A little  Moving to and from bed to chair (including a wheelchair): A little  Standing up from  a chair using your arms (e.g. wheelchair or bedside chair): A little  To walk in hospital room: A little  Climbing 3-5 steps with railing: A little  Basic Mobility - Total Score: 18    Encounter Problems       Encounter Problems (Active)       Balance          Mobility       LTG - Patient will be able to go up and down a curb/step with the appropriate device (Progressing)       Start:  04/10/25    Expected End:  04/17/25            LTG - Patient will navigate 4-6 steps with rails/device (Progressing)       Start:  04/10/25    Expected End:  04/17/25            bed mobility (Progressing)       Start:  04/10/25    Expected End:  04/17/25       Pt will perform sup to/from sit transfer with supervision via log roll         ambulation (Progressing)       Start:  04/10/25    Expected End:  04/17/25       Pt will amb > 150  ft with wheeled walker vs LRAD and mod independence             PT Transfers       sit to stand (Progressing)       Start:  04/10/25    Expected End:  04/17/25       Pt will perform sit to stand transfer with LRAD and mod independence.             Pain - Adult          Safety       precautions (Progressing)       Start:  04/10/25    Expected End:  04/17/25       Pt will maintain spinal precautions with all mobility.                 Education Documentation  Precautions, taught by Karis Ulrich PT at 4/10/2025  3:00 PM.  Learner: Patient  Readiness: Acceptance  Method: Explanation  Response: Needs Reinforcement  Comment: spinal precautions, falls risk, PT POC    Body Mechanics, taught by Karis Ulrich PT at 4/10/2025  3:00 PM.  Learner: Patient  Readiness: Acceptance  Method: Explanation  Response: Needs Reinforcement  Comment: spinal precautions, falls risk, PT POC    Mobility Training, taught by Karis Ulrich PT at 4/10/2025  3:00 PM.  Learner: Patient  Readiness: Acceptance  Method: Explanation  Response: Needs Reinforcement  Comment: spinal precautions, falls risk, PT POC    Education Comments  No comments  found.

## 2025-04-10 NOTE — CONSULTS
Inpatient consult to Medicine  Consult performed by: DEA Brown-CNP  Consult ordered by: Jaron Enriquez MD          See note dated 4/10 for consult

## 2025-04-11 ENCOUNTER — HOME HEALTH ADMISSION (OUTPATIENT)
Dept: HOME HEALTH SERVICES | Facility: HOME HEALTH | Age: 50
End: 2025-04-11
Payer: COMMERCIAL

## 2025-04-11 ENCOUNTER — DOCUMENTATION (OUTPATIENT)
Dept: HOME HEALTH SERVICES | Facility: HOME HEALTH | Age: 50
End: 2025-04-11
Payer: COMMERCIAL

## 2025-04-11 VITALS
SYSTOLIC BLOOD PRESSURE: 104 MMHG | DIASTOLIC BLOOD PRESSURE: 72 MMHG | TEMPERATURE: 98.1 F | OXYGEN SATURATION: 97 % | HEIGHT: 60 IN | RESPIRATION RATE: 16 BRPM | HEART RATE: 71 BPM | BODY MASS INDEX: 20.42 KG/M2 | WEIGHT: 104 LBS

## 2025-04-11 PROBLEM — M54.16 LUMBAR RADICULOPATHY: Status: RESOLVED | Noted: 2025-03-17 | Resolved: 2025-04-11

## 2025-04-11 LAB
ANION GAP SERPL CALCULATED.3IONS-SCNC: 13 MMOL/L (ref 10–20)
BUN SERPL-MCNC: 14 MG/DL (ref 6–23)
CALCIUM SERPL-MCNC: 8.6 MG/DL (ref 8.6–10.3)
CHLORIDE SERPL-SCNC: 106 MMOL/L (ref 98–107)
CO2 SERPL-SCNC: 22 MMOL/L (ref 21–32)
CREAT SERPL-MCNC: 0.69 MG/DL (ref 0.5–1.05)
EGFRCR SERPLBLD CKD-EPI 2021: >90 ML/MIN/1.73M*2
ERYTHROCYTE [DISTWIDTH] IN BLOOD BY AUTOMATED COUNT: 12.1 % (ref 11.5–14.5)
GLUCOSE SERPL-MCNC: 165 MG/DL (ref 74–99)
HCT VFR BLD AUTO: 39.7 % (ref 36–46)
HGB BLD-MCNC: 13.2 G/DL (ref 12–16)
MCH RBC QN AUTO: 30.3 PG (ref 26–34)
MCHC RBC AUTO-ENTMCNC: 33.2 G/DL (ref 32–36)
MCV RBC AUTO: 91 FL (ref 80–100)
NRBC BLD-RTO: 0 /100 WBCS (ref 0–0)
PLATELET # BLD AUTO: 252 X10*3/UL (ref 150–450)
POTASSIUM SERPL-SCNC: 3.9 MMOL/L (ref 3.5–5.3)
RBC # BLD AUTO: 4.35 X10*6/UL (ref 4–5.2)
SODIUM SERPL-SCNC: 137 MMOL/L (ref 136–145)
WBC # BLD AUTO: 20.3 X10*3/UL (ref 4.4–11.3)

## 2025-04-11 PROCEDURE — 36415 COLL VENOUS BLD VENIPUNCTURE: CPT | Performed by: NURSE PRACTITIONER

## 2025-04-11 PROCEDURE — 97116 GAIT TRAINING THERAPY: CPT | Mod: GP,CQ

## 2025-04-11 PROCEDURE — 2500000004 HC RX 250 GENERAL PHARMACY W/ HCPCS (ALT 636 FOR OP/ED): Performed by: ORTHOPAEDIC SURGERY

## 2025-04-11 PROCEDURE — 80048 BASIC METABOLIC PNL TOTAL CA: CPT | Performed by: NURSE PRACTITIONER

## 2025-04-11 PROCEDURE — 2500000001 HC RX 250 WO HCPCS SELF ADMINISTERED DRUGS (ALT 637 FOR MEDICARE OP): Performed by: ORTHOPAEDIC SURGERY

## 2025-04-11 PROCEDURE — 97110 THERAPEUTIC EXERCISES: CPT | Mod: GP,CQ

## 2025-04-11 PROCEDURE — 85027 COMPLETE CBC AUTOMATED: CPT | Performed by: NURSE PRACTITIONER

## 2025-04-11 RX ADMIN — POLYETHYLENE GLYCOL 3350 17 G: 17 POWDER, FOR SOLUTION ORAL at 09:30

## 2025-04-11 RX ADMIN — GABAPENTIN 600 MG: 600 TABLET, FILM COATED ORAL at 09:30

## 2025-04-11 RX ADMIN — VENLAFAXINE HYDROCHLORIDE 150 MG: 150 CAPSULE, EXTENDED RELEASE ORAL at 09:30

## 2025-04-11 RX ADMIN — KETOROLAC TROMETHAMINE 15 MG: 30 INJECTION, SOLUTION INTRAMUSCULAR at 05:43

## 2025-04-11 RX ADMIN — OXYCODONE HYDROCHLORIDE 10 MG: 5 TABLET ORAL at 09:30

## 2025-04-11 RX ADMIN — PANTOPRAZOLE SODIUM 40 MG: 40 TABLET, DELAYED RELEASE ORAL at 09:30

## 2025-04-11 RX ADMIN — METHOCARBAMOL 1000 MG: 500 TABLET ORAL at 09:30

## 2025-04-11 RX ADMIN — ACETAMINOPHEN 975 MG: 325 TABLET, FILM COATED ORAL at 05:43

## 2025-04-11 RX ADMIN — DEXAMETHASONE SODIUM PHOSPHATE 8 MG: 4 INJECTION, SOLUTION INTRAMUSCULAR; INTRAVENOUS at 05:43

## 2025-04-11 ASSESSMENT — COGNITIVE AND FUNCTIONAL STATUS - GENERAL
MOBILITY SCORE: 18
TURNING FROM BACK TO SIDE WHILE IN FLAT BAD: A LITTLE
MOVING TO AND FROM BED TO CHAIR: A LITTLE
MOVING TO AND FROM BED TO CHAIR: A LITTLE
MOVING FROM LYING ON BACK TO SITTING ON SIDE OF FLAT BED WITH BEDRAILS: A LITTLE
STANDING UP FROM CHAIR USING ARMS: A LITTLE
STANDING UP FROM CHAIR USING ARMS: A LITTLE
DRESSING REGULAR LOWER BODY CLOTHING: A LITTLE
WALKING IN HOSPITAL ROOM: A LITTLE
DRESSING REGULAR UPPER BODY CLOTHING: A LITTLE
MOBILITY SCORE: 18
CLIMB 3 TO 5 STEPS WITH RAILING: A LITTLE
WALKING IN HOSPITAL ROOM: A LITTLE
MOVING FROM LYING ON BACK TO SITTING ON SIDE OF FLAT BED WITH BEDRAILS: A LITTLE
PERSONAL GROOMING: A LITTLE
CLIMB 3 TO 5 STEPS WITH RAILING: A LITTLE
EATING MEALS: A LITTLE
TURNING FROM BACK TO SIDE WHILE IN FLAT BAD: A LITTLE
TOILETING: A LITTLE
HELP NEEDED FOR BATHING: A LITTLE
DAILY ACTIVITIY SCORE: 18

## 2025-04-11 ASSESSMENT — PAIN SCALES - GENERAL
PAINLEVEL_OUTOF10: 0 - NO PAIN
PAINLEVEL_OUTOF10: 7
PAINLEVEL_OUTOF10: 5 - MODERATE PAIN

## 2025-04-11 ASSESSMENT — PAIN - FUNCTIONAL ASSESSMENT: PAIN_FUNCTIONAL_ASSESSMENT: 0-10

## 2025-04-11 ASSESSMENT — PAIN DESCRIPTION - ORIENTATION: ORIENTATION: LOWER

## 2025-04-11 ASSESSMENT — PAIN DESCRIPTION - LOCATION: LOCATION: BACK

## 2025-04-11 NOTE — CARE PLAN
The patient's goals for the shift include pain management    The clinical goals for the shift include pain management      Problem: Fall/Injury  Goal: Not fall by end of shift  Outcome: Progressing  Goal: Be free from injury by end of the shift  Outcome: Progressing  Goal: Verbalize understanding of personal risk factors for fall in the hospital  Outcome: Progressing  Goal: Verbalize understanding of risk factor reduction measures to prevent injury from fall in the home  Outcome: Progressing  Goal: Use assistive devices by end of the shift  Outcome: Progressing  Goal: Pace activities to prevent fatigue by end of the shift  Outcome: Progressing     Problem: Pain  Goal: Takes deep breaths with improved pain control throughout the shift  Outcome: Progressing  Goal: Turns in bed with improved pain control throughout the shift  Outcome: Progressing  Goal: Walks with improved pain control throughout the shift  Outcome: Progressing  Goal: Performs ADL's with improved pain control throughout shift  Outcome: Progressing  Goal: Participates in PT with improved pain control throughout the shift  Outcome: Progressing  Goal: Free from opioid side effects throughout the shift  Outcome: Progressing  Goal: Free from acute confusion related to pain meds throughout the shift  Outcome: Progressing     Problem: Pain - Adult  Goal: Verbalizes/displays adequate comfort level or baseline comfort level  Outcome: Progressing

## 2025-04-11 NOTE — CARE PLAN
Problem: Fall/Injury  Goal: Not fall by end of shift  Outcome: Progressing  Goal: Be free from injury by end of the shift  Outcome: Progressing  Goal: Verbalize understanding of personal risk factors for fall in the hospital  Outcome: Progressing  Goal: Verbalize understanding of risk factor reduction measures to prevent injury from fall in the home  Outcome: Progressing  Goal: Use assistive devices by end of the shift  Outcome: Progressing  Goal: Pace activities to prevent fatigue by end of the shift  Outcome: Progressing   The patient's goals for the shift include pain management    The clinical goals for the shift include manage pain, mobility

## 2025-04-11 NOTE — PROGRESS NOTES
04/11/25 1013   Discharge Planning   Expected Discharge Disposition Home H   Does the patient need discharge transport arranged? No     Physician placed order for  Home Care.  Home Care made aware and is processing referral at this time. They will advise once they determine SOC. Nurse updated.     10:39 AM  [facility] advised of SOC between 4/12 and 4/13. Nurse updated.

## 2025-04-11 NOTE — PROGRESS NOTES
Physical Therapy    Physical Therapy Treatment    Patient Name: Suly Vance  MRN: [id number]  Department: [address]  Room: [address] [place], [place] [address]  Today's Date: 4/11/2025  Time Calculation  Start Time: 0852  Stop Time: 0923  Time Calculation (min): 31 min         Assessment/Plan   PT Assessment  PT Assessment Results: Decreased strength, Decreased endurance, Impaired balance, Decreased mobility, Decreased coordination, Decreased safety awareness, Impaired judgement, Orthopedic restrictions, Pain, Impaired sensation  Rehab Prognosis: Good  Barriers to Discharge Home: No anticipated barriers  Evaluation/Treatment Tolerance: Patient tolerated treatment well  Medical Staff Made Aware: Yes  Strengths: Attitude of self, Ability to acquire knowledge  Barriers to Participation: Premorbid level of function  End of Session Communication: Bedside nurse  Assessment Comment: Pt moving well today. Talkative. Pt tolerating greater than house hold ambulation distances. Pt did have x1 LOB requiring assist to recover.  End of Session Patient Position: Up in chair, Alarm off, not on at start of session     PT Plan  Treatment/Interventions: Bed mobility, Transfer training, Gait training, Stair training, Balance training, Strengthening, Endurance training, Therapeutic exercise, Home exercise program, Therapeutic activity  PT Plan: Ongoing PT  PT Frequency: Daily  PT Discharge Recommendations: Low intensity level of continued care  Equipment Recommended upon Discharge: Wheeled walker  PT Recommended Transfer Status: Stand by assist  PT - OK to Discharge: Yes      General Visit Information:   PT  Visit  PT Received On: 04/11/25  Response to Previous Treatment: Patient with no complaints from previous session.  General  Reason for Referral: impaired mobility; recent surgery, L3-4 lateral lumbar fusion  Referred By: Jaron Enriquez MD  Past Medical History Relevant to Rehab: chronic LBP with radiculopathy, HTN, vitamin D deficiency, depression with  anxiety.  Prior to Session Communication: Bedside nurse  Patient Position Received: Up in chair, Alarm off, not on at start of session  Preferred Learning Style: verbal, visual  General Comment: Cleared per nurse to see pt for PT. Pt agreeable. Per pt has been getting up on own around room/to bathroom.    Subjective   Precautions:  Precautions  Medical Precautions: Fall precautions  Post-Surgical Precautions: Spinal precautions  Precautions Comment: Pt able to recall 0/3 spinal precautions.Reviewed spinal precautions with pt.        Objective   Pain:  Pain Assessment  Pain Assessment: 0-10  0-10 (Numeric) Pain Score: 5 - Moderate pain  Pain Type: Surgical pain  Pain Location: Back  Pain Orientation: Lower  Pain Interventions: Ambulation/increased activity (RN aware)  Response to Interventions: Resting quietly    Cognition:  Cognition  Overall Cognitive Status: Within Functional Limits  Insight: Mild  Impulsive: Mildly     Treatments:  Therapeutic Exercise  Therapeutic Exercise Performed: Yes  Therapeutic Exercise Activity 1: Pt performed seated bilat LE ankle pumps, heel raises, glute sets, LAQ, john hip adduction and resisted hip abduction x15 reps each.    Ambulation/Gait Training 1  Surface 1: Level tile  Device 1: No device  Assistance 1: Close supervision, Contact guard  Quality of Gait 1: Decreased step length  Comments/Distance (ft) 1: Pt ambulated without AD ~130' x2 close supervision, seated rest between trials. Pt ambulated at slow pace, mod guarding. Decreased bilat step height and length. Pt did have x1 LOB requiring min assist to recover.    Transfer 1  Transfer From 1: Chair with arms to  Transfer to 1: Stand  Technique 1: Sit to stand  Transfer Level of Assistance 1: Close supervision  Transfers 2  Transfer From 2: Stand to  Transfer to 2: Sit, Toilet  Technique 2: Stand to sit, Sit to stand  Transfer Level of Assistance 2: Distant supervision  Transfers 3  Transfer From 3: Stand to  Transfer to 3:  Sit, Chair with arms  Technique 3: Stand to sit  Transfer Level of Assistance 3: Close supervision    Stairs  Stairs: Yes  Stairs  Rails 1: None (Comment)  Curb Step 1: No  Device 1:  (Hand held assist of 1.)  Assistance 1: Minimum assistance, Minimal verbal cues  Comment/Number of Steps 1: Up/down 4 steps with hand held assist and verbal cues for sequencing non reciprocal pattern.    Outcome Measures:  [facility] Basic Mobility  Turning from your back to your side while in a flat bed without using bedrails: A little  Moving from lying on your back to sitting on the side of a flat bed without using bedrails: A little  Moving to and from bed to chair (including a wheelchair): A little  Standing up from a chair using your arms (e.g. wheelchair or bedside chair): A little  To walk in hospital room: A little  Climbing 3-5 steps with railing: A little  Basic Mobility - Total Score: 18    Education Documentation  Handouts, taught by Yaneli Valiente PTA at 4/11/2025  9:37 AM.  Learner: Patient  Readiness: Acceptance  Method: Explanation  Response: Verbalizes Understanding  Comment: Safety with mobility, post op precautions, stairs, HEP    Precautions, taught by Yaneli Valiente PTA at 4/11/2025  9:37 AM.  Learner: Patient  Readiness: Acceptance  Method: Explanation  Response: Verbalizes Understanding  Comment: Safety with mobility, post op precautions, stairs, HEP    Body Mechanics, taught by Yaneli Valiente PTA at 4/11/2025  9:37 AM.  Learner: Patient  Readiness: Acceptance  Method: Explanation  Response: Verbalizes Understanding  Comment: Safety with mobility, post op precautions, stairs, HEP    Home Exercise Program, taught by Yaneli Valiente PTA at 4/11/2025  9:37 AM.  Learner: Patient  Readiness: Acceptance  Method: Explanation  Response: Verbalizes Understanding  Comment: Safety with mobility, post op precautions, stairs, HEP    Mobility Training, taught by Yaneli Valiente PTA at 4/11/2025  9:37 AM.  Learner: Patient  Readiness:  Acceptance  Method: Explanation  Response: Verbalizes Understanding  Comment: Safety with mobility, post op precautions, stairs, HEP    Education Comments  No comments found.        OP EDUCATION:       Encounter Problems       Encounter Problems (Active)       Balance          Mobility       LTG - Patient will be able to go up and down a curb/step with the appropriate device (Progressing)       Start:  04/10/25    Expected End:  04/17/25            LTG - Patient will navigate 4-6 steps with rails/device (Progressing)       Start:  04/10/25    Expected End:  04/17/25            bed mobility (Progressing)       Start:  04/10/25    Expected End:  04/17/25       Pt will perform sup to/from sit transfer with supervision via log roll         ambulation (Progressing)       Start:  04/10/25    Expected End:  04/17/25       Pt will amb > 150  ft with wheeled walker vs LRAD and mod independence             PT Transfers       sit to stand (Progressing)       Start:  04/10/25    Expected End:  04/17/25       Pt will perform sit to stand transfer with LRAD and mod independence.             Pain - Adult          Safety       precautions (Progressing)       Start:  04/10/25    Expected End:  04/17/25       Pt will maintain spinal precautions with all mobility.

## 2025-04-11 NOTE — HH CARE COORDINATION
Home Care received a Referral for Nursing, Physical Therapy, and Occupational Therapy. We have processed the referral for a Start of Care on 04/12-04/13.     If you have any questions or concerns, please feel free to contact us at [phone]. Follow the prompts, enter your five digit zip code, and you will be directed to your care team on [facility] 1.

## 2025-04-14 ENCOUNTER — HOME CARE VISIT (OUTPATIENT)
Dept: HOME HEALTH SERVICES | Facility: HOME HEALTH | Age: 50
End: 2025-04-14
Payer: COMMERCIAL

## 2025-04-14 ENCOUNTER — PATIENT OUTREACH (OUTPATIENT)
Dept: PRIMARY CARE | Facility: CLINIC | Age: 50
End: 2025-04-14
Payer: COMMERCIAL

## 2025-04-14 VITALS
HEART RATE: 76 BPM | OXYGEN SATURATION: 97 % | DIASTOLIC BLOOD PRESSURE: 80 MMHG | RESPIRATION RATE: 18 BRPM | TEMPERATURE: 97.5 F | SYSTOLIC BLOOD PRESSURE: 134 MMHG

## 2025-04-14 PROCEDURE — 0023 HH SOC

## 2025-04-14 PROCEDURE — G0299 HHS/HOSPICE OF RN EA 15 MIN: HCPCS

## 2025-04-14 ASSESSMENT — ENCOUNTER SYMPTOMS
APPETITE LEVEL: GOOD
PAIN: 1
HIGHEST PAIN SEVERITY IN PAST 24 HOURS: 5/10
PAIN LOCATION: BACK

## 2025-04-14 ASSESSMENT — ACTIVITIES OF DAILY LIVING (ADL)
OASIS_M1830: 03
ENTERING_EXITING_HOME: STAND BY ASSIST
CURRENT_FUNCTION: STAND BY ASSIST
AMBULATION ASSISTANCE: STAND BY ASSIST

## 2025-04-14 ASSESSMENT — LIFESTYLE VARIABLES: SMOKING_STATUS: 1

## 2025-04-14 NOTE — PROGRESS NOTES
Discharge Facility: [facility]     Discharge Diagnosis:    Nerve pain due to spinal stenosis    Admission Date: 4/10/2025   Discharge Date:  4/11/2025     PCP Appointment Date: Tasked office     Specialist Appointment Date:     [facility] active     MAY 1 Orthopedic Post-Op Visit with [facility] Encounter and Summary Linked: Yes    Admission (Discharged) with Jaron Enriquez MD (04/10/2025)     See discharge assessment below for further details     Wrap Up  Wrap Up Additional Comments: Patient states has support in community, aware of med changes, [facility] active in home. Patient awrae of DC instructions along with calling providers for any questions concerns or change in condition. Message to PCP office regarding F/U . (4/14/2025 12:36 PM)    Engagement  Call Start Time: 1236 (4/14/2025 12:36 PM)    Medications  Medications reviewed with patient/caregiver?: Yes (4/14/2025 12:36 PM)  Is the patient having any side effects they believe may be caused by any medication additions or changes?: No (4/14/2025 12:36 PM)  Does the patient have all medications ordered at discharge?: Yes (4/14/2025 12:36 PM)  Care Management Interventions: No intervention needed (4/14/2025 12:36 PM)  Prescription Comments: START taking:  acetaminophen (Tylenol Extra Strength)   methocarbamol (Robaxin)   oxyCODONE (Roxicodone)   polyethylene glycol (Miralax) (4/14/2025 12:36 PM)  Is the patient taking all medications as directed (includes completed medication regime)?: -- (pt states has meds) (4/14/2025 12:36 PM)  Care Management Interventions: Provided patient education (4/14/2025 12:36 PM)  Medication Comments: STOP taking: chlorhexidine 0.12 % solution (Peridex) naloxone 4 mg/0.1 mL nasal spray (Narcan) (4/14/2025 12:36 PM)    Appointments  Does the patient have a primary care provider?: Yes (4/14/2025 12:36 PM)  Care Management Interventions: Educated patient on importance of making appointment (4/14/2025 12:36 PM)  Care  Management Interventions: Advised patient to keep appointment (4/14/2025 12:36 PM)    Self Management  What is the home health agency?: [facility] active (4/14/2025 12:36 PM)  What Durable Medical Equipment (DME) was ordered?: NA (4/14/2025 12:36 PM)    Patient Teaching  Does the patient have access to their discharge instructions?: Yes (4/14/2025 12:36 PM)  Care Management Interventions: Reviewed instructions with patient (4/14/2025 12:36 PM)  What is the patient's perception of their health status since discharge?: Improving (4/14/2025 12:36 PM)  Is the patient/caregiver able to teach back the hierarchy of who to call/visit for symptoms/problems? PCP, Specialist, Home Health nurse, Urgent Care, ED, 911: Yes (4/14/2025 12:36 PM)

## 2025-04-15 ENCOUNTER — TELEPHONE (OUTPATIENT)
Dept: INPATIENT UNIT | Facility: HOSPITAL | Age: 50
End: 2025-04-15
Payer: COMMERCIAL

## 2025-04-15 ENCOUNTER — HOME CARE VISIT (OUTPATIENT)
Dept: HOME HEALTH SERVICES | Facility: HOME HEALTH | Age: 50
End: 2025-04-15
Payer: COMMERCIAL

## 2025-04-15 VITALS — HEART RATE: 89 BPM | TEMPERATURE: 97.2 F | OXYGEN SATURATION: 93 % | RESPIRATION RATE: 18 BRPM

## 2025-04-15 PROCEDURE — G0152 HHCP-SERV OF OT,EA 15 MIN: HCPCS

## 2025-04-15 ASSESSMENT — ACTIVITIES OF DAILY LIVING (ADL)
GROOMING_WITHIN_DEFINED_LIMITS: 1
DRESSING_LB_CURRENT_FUNCTION: INDEPENDENT
FEEDING_WITHIN_DEFINED_LIMITS: 1
BATHING_CURRENT_FUNCTION: INDEPENDENT
TOILETING: INDEPENDENT
TOILETING: 1
BATHING ASSESSED: 1

## 2025-04-15 ASSESSMENT — PAIN SCALES - PAIN ASSESSMENT IN ADVANCED DEMENTIA (PAINAD)
TOTALSCORE: 0
CONSOLABILITY: 0
BREATHING: 0
FACIALEXPRESSION: 0 - SMILING OR INEXPRESSIVE.
BODYLANGUAGE: 0
NEGVOCALIZATION: 0 - NONE.
NEGVOCALIZATION: 0
FACIALEXPRESSION: 0
CONSOLABILITY: 0 - NO NEED TO CONSOLE.
BODYLANGUAGE: 0 - RELAXED.

## 2025-04-15 ASSESSMENT — ENCOUNTER SYMPTOMS
HIGHEST PAIN SEVERITY IN PAST 24 HOURS: 10/10
PAIN: 1
PERSON REPORTING PAIN: PATIENT
SUBJECTIVE PAIN PROGRESSION: GRADUALLY IMPROVING
LOWEST PAIN SEVERITY IN PAST 24 HOURS: 4/10
PAIN SEVERITY GOAL: 0/10

## 2025-04-16 ENCOUNTER — HOME CARE VISIT (OUTPATIENT)
Dept: HOME HEALTH SERVICES | Facility: HOME HEALTH | Age: 50
End: 2025-04-16
Payer: COMMERCIAL

## 2025-04-16 VITALS
SYSTOLIC BLOOD PRESSURE: 154 MMHG | RESPIRATION RATE: 20 BRPM | OXYGEN SATURATION: 99 % | HEART RATE: 68 BPM | TEMPERATURE: 97.9 F | DIASTOLIC BLOOD PRESSURE: 107 MMHG

## 2025-04-16 PROCEDURE — G0151 HHCP-SERV OF PT,EA 15 MIN: HCPCS

## 2025-04-16 SDOH — HEALTH STABILITY: PHYSICAL HEALTH: PHYSICAL EXERCISE: 10

## 2025-04-16 SDOH — HEALTH STABILITY: PHYSICAL HEALTH: EXERCISE TYPE: WRITTEN

## 2025-04-16 ASSESSMENT — ENCOUNTER SYMPTOMS
PAIN LOCATION: BACK
PAIN LOCATION - EXACERBATING FACTORS: MVMT
PAIN LOCATION - PAIN FREQUENCY: INTERMITTENT
PAIN LOCATION - RELIEVING FACTORS: REST
LOWEST PAIN SEVERITY IN PAST 24 HOURS: 5/10
HIGHEST PAIN SEVERITY IN PAST 24 HOURS: 10/10
PAIN LOCATION - PAIN SEVERITY: 5/10
ARTHRALGIAS: 1
PAIN SEVERITY GOAL: 3/10
MUSCLE WEAKNESS: 1
PAIN LOCATION - PAIN DURATION: VARIES
PAIN LOCATION - PAIN QUALITY: ACHING
PAIN: 1
SUBJECTIVE PAIN PROGRESSION: WAXING AND WANING
PERSON REPORTING PAIN: PATIENT

## 2025-04-16 ASSESSMENT — GAIT ASSESSMENTS
STEP CONTINUITY: 0 - STOPPING OR DISCONTINUITY BETWEEN STEPS
GAIT SCORE: 6
STEP SYMMETRY: 0 - RIGHT AND LEFT STEP LENGTH NOT EQUAL
PATH: 1 - MILD/MODERATE DEVIATION OR USES WALKING AID
INITIATION OF GAIT IMMEDIATELY AFTER GO: 0 - ANY HESITANCY OR MULTIPLE ATTEMPTS TO START
TRUNK SCORE: 1
WALKING STANCE: 0 - HEELS APART
PATH SCORE: 1
TRUNK: 1 - NO SWAY BUT FLEXION OF KNEES OR BACK OR SPREADS ARMS WHILE WALKING
BALANCE AND GAIT SCORE: 16

## 2025-04-16 ASSESSMENT — BALANCE ASSESSMENTS
BALANCE SCORE: 10
ATTEMPTS TO ARISE: 1 - ABLE, REQUIRES MORE THAN ONE ATTEMPT
IMMEDIATE STANDING BALANCE FIRST 5 SECONDS: 1 - STEADY BUT USES WALKER OR OTHER SUPPORT
NUDGED: 1 - STAGGERS, GRABS, CATCHES SELF
STANDING BALANCE: 1 - STEADY BUT WIDE STANCE AND USES CANE OR OTHER SUPPORT
EYES CLOSED AT MAXIMUM POSITION NUDGED: 1 - STEADY
ARISING SCORE: 1
SITTING DOWN: 1 - USES ARMS OR NOT SMOOTH MOTION
SITTING BALANCE: 1 - STEADY, SAFE
NUDGED SCORE: 1
ARISES: 1 - ABLE, USES ARMS TO HELP
TURNING 360 DEGREES STEPS: 1 - CONTINUOUS STEPS

## 2025-04-16 ASSESSMENT — ACTIVITIES OF DAILY LIVING (ADL)
AMBULATION ASSISTANCE ON FLAT SURFACES: 1
AMBULATION ASSISTANCE: 1
AMBULATION_DISTANCE/DURATION_TOLERATED: 50'
AMBULATION ASSISTANCE: STAND BY ASSIST
CURRENT_FUNCTION: STAND BY ASSIST
PHYSICAL TRANSFERS ASSESSED: 1

## 2025-04-17 NOTE — DISCHARGE SUMMARY
Discharge Diagnosis  Neurogenic claudication due to lumbar spinal stenosis    Issues Requiring Follow-Up  Lumbar surgery    Test Results Pending At Discharge  Pending Labs       No current pending labs.            Hospital Course   Unremarkable, no events, admitted for IV abx and pain medication overnight    Physical Examination:  Baseline neuro exam    Home Medications     Medication List      START taking these medications     acetaminophen 500 mg tablet; Commonly known as: Tylenol Extra Strength;   Take 2 tablets (1,000 mg) by mouth every 8 hours for 14 days.   methocarbamol 500 mg tablet; Commonly known as: Robaxin; Take 1-2   tablets by mouth  every 8 hours as needed for spasms   oxyCODONE 5 mg immediate release tablet; Commonly known as: Roxicodone;   Take 1 tablet (5 mg) by mouth every 4 hours if needed for severe pain (7 -   10) for up to 7 days.   polyethylene glycol 17 gram/dose powder; Commonly known as: Miralax; Mix   17 g of powder and drink 2 times a day as needed (constipation).     CONTINUE taking these medications     ADULT MULTIVITAMIN GUMMIES ORAL   chlorzoxazone 500 mg tablet; Commonly known as: Parafon Forte; Take 1   tablet (500 mg) by mouth 4 times a day as needed for muscle spasms.   cholecalciferol 50 mcg (2,000 units) tablet; Commonly known as: Vitamin   D3; Take 1 tablet (2,000 Units) by mouth once daily.   gabapentin 600 mg tablet; Commonly known as: Neurontin; TAKE 1   TABLET(600 MG) BY MOUTH THREE TIMES DAILY   pantoprazole 40 mg EC tablet; Commonly known as: ProtoNix; TAKE 1 TABLET   BY MOUTH EVERY DAY   SUMAtriptan 100 mg tablet; Commonly known as: Imitrex; Take 1 tablet   (100 mg) by mouth 1 time if needed for migraine. AT LEAST 2 HOURS BETWEEN   DOSES AS NEEDED FOR HEADACHE   traZODone 100 mg tablet; Commonly known as: Desyrel; TAKE 2 TABLETS(200   MG) BY MOUTH EVERY DAY AT BEDTIME   venlafaxine  mg 24 hr capsule; Commonly known as: Effexor-XR; Take   1 capsule (150 mg) by  mouth once daily. Take with food.     STOP taking these medications     chlorhexidine 0.12 % solution; Commonly known as: Peridex   naloxone 4 mg/0.1 mL nasal spray; Commonly known as: Narcan       Outpatient Follow-Up  Future Appointments   Date Time Provider Department Center   4/21/2025 To Be Determined Roseann Jimenez RN [facility]   4/22/2025  9:15 AM Michaela Arthur MD [id number] [facility]   4/23/2025 To Be Determined Deborah Hassan, PT [facility]   4/25/2025 To Be Determined Moira Pelletier, [facility]   4/28/2025 To Be Determined Moira Pelletier, [facility]   4/29/2025 To Be Determined Roseann Jimenez RN [facility]   4/30/2025 To Be Determined Moira Pelletier, [facility]   5/1/2025 10:30 AM ELLA [id number] [facility]   5/5/2025 To Be Determined Moira Pelletier, [facility]   5/6/2025 To Be Determined Roseann Jimenez RN [facility]   5/7/2025 To Be Determined Moira Pelletier, [facility]   5/14/2025 To Be Determined Moira Pelletier, [facility]   5/17/2025 To Be Determined Deborah Hassan, [facility]       Jaron Enriquez MD

## 2025-04-18 NOTE — DOCUMENTATION CLARIFICATION NOTE
"    PATIENT:               PREMA FITZGERALD  ACCT #:                  [id number]  MRN:                       [id number]  :                       1975  ADMIT DATE:       4/10/2025 5:56 AM  DISCH DATE:        2025 10:51 AM  RESPONDING PROVIDER #:        [id number]          PROVIDER RESPONSE TEXT:    A thorough diskectomy with Interbody Fusion with cage filled with Osteocel bone graft at level of  Lumbar 3-4    CDI QUERY TEXT:    Clarification        Instruction:    Based on your assessment of the patient and the clinical information, please provide the requested documentation by clicking on the appropriate radio button and enter any additional information if prompted.    Question: In reviewing the operative note for this patient, certain crucial elements for coding are absent for the level of the discectomy and fusion.    The level of the discectomy and fusion was only listed as the procedure header. There was not a level documented in the body of the operative report    When answering this query, please exercise your independent professional judgment. The fact that a question is being asked, does not imply that any particular answer is desired or expected.    The patient's clinical indicators include:  Clinical Information:    This query refers to the procedure performed on 4/10/25  Operative Report 4/10/25 by Dr. Enriquez:    \"A thorough diskectomy was performed using a series of pituitary rongeurs and angled curettes\"    \"Trial sizers were placed and a size 10 x 22 x 50 mm cage was filled with Osteocel bone graft and then passed across the disc space under fluoroscopic visualization.\"  Options provided:  -- A thorough diskectomy with Interbody Fusion with cage filled with Osteocel bone graft at level of  Lumbar 3-4  -- Other - I will add my own diagnosis  -- Refer to Clinical Documentation Reviewer    Query created by: Lourdes Finley on 2025 2:10 PM      Electronically signed by:  VINNY ENRIQEUZ MD " 4/18/2025 10:18 AM

## 2025-04-21 ENCOUNTER — HOME CARE VISIT (OUTPATIENT)
Dept: HOME HEALTH SERVICES | Facility: HOME HEALTH | Age: 50
End: 2025-04-21
Payer: COMMERCIAL

## 2025-04-21 VITALS
TEMPERATURE: 98.8 F | RESPIRATION RATE: 20 BRPM | OXYGEN SATURATION: 96 % | DIASTOLIC BLOOD PRESSURE: 92 MMHG | SYSTOLIC BLOOD PRESSURE: 140 MMHG | HEART RATE: 84 BPM

## 2025-04-21 PROCEDURE — RXMED WILLOW AMBULATORY MEDICATION CHARGE

## 2025-04-21 PROCEDURE — G0299 HHS/HOSPICE OF RN EA 15 MIN: HCPCS

## 2025-04-21 ASSESSMENT — ENCOUNTER SYMPTOMS
STOOL FREQUENCY: DAILY
SORE THROAT: 1
DEPRESSION: 0
DYSPNEA ACTIVITY LEVEL: AFTER AMBULATING 10 - 20 FT
PAIN: 1
SHORTNESS OF BREATH: 1
PAIN LOCATION - EXACERBATING FACTORS: MOVEMENT
SPUTUM CONSISTENCY: THICK
SPUTUM AMOUNT: SCANT
PAIN LOCATION - RELIEVING FACTORS: REST, MEDICATIONS
SPUTUM COLOR: YELLOW
PAIN SEVERITY GOAL: 0/10
MUSCLE WEAKNESS: 1
LAST BOWEL MOVEMENT: 67316
TINGLING: 1
PAIN LOCATION: RIGHT LEG
CHEST TIGHTNESS: 1
SPUTUM PRODUCTION: 1
PAIN LOCATION - PAIN FREQUENCY: CONSTANT
FATIGUE: 1
SUBJECTIVE PAIN PROGRESSION: UNCHANGED
LOWEST PAIN SEVERITY IN PAST 24 HOURS: 4/10
OCCASIONAL FEELINGS OF UNSTEADINESS: 1
PERSON REPORTING PAIN: PATIENT
HIGHEST PAIN SEVERITY IN PAST 24 HOURS: 10/10
CHANGE IN APPETITE: UNCHANGED
PAIN LOCATION - PAIN SEVERITY: 6/10
DRY SKIN: 1
PAIN LOCATION - PAIN DURATION: CONSTANT
LOSS OF SENSATION IN FEET: 0
HEADACHES: 1
APPETITE LEVEL: GOOD
AGITATION: 1
BOWEL PATTERN NORMAL: 1
COUGH: 1

## 2025-04-21 ASSESSMENT — ACTIVITIES OF DAILY LIVING (ADL)
AMBULATION ASSISTANCE: STAND BY ASSIST
AMBULATION ASSISTANCE: 1
MONEY MANAGEMENT (EXPENSES/BILLS): INDEPENDENT

## 2025-04-22 ENCOUNTER — OFFICE VISIT (OUTPATIENT)
Dept: PRIMARY CARE | Facility: CLINIC | Age: 50
End: 2025-04-22
Payer: COMMERCIAL

## 2025-04-22 ENCOUNTER — PHARMACY VISIT (OUTPATIENT)
Dept: PHARMACY | Facility: CLINIC | Age: 50
End: 2025-04-22
Payer: COMMERCIAL

## 2025-04-22 VITALS
HEART RATE: 72 BPM | BODY MASS INDEX: 21.23 KG/M2 | OXYGEN SATURATION: 97 % | SYSTOLIC BLOOD PRESSURE: 108 MMHG | DIASTOLIC BLOOD PRESSURE: 64 MMHG | WEIGHT: 108.7 LBS

## 2025-04-22 DIAGNOSIS — F17.200 TOBACCO USE DISORDER: ICD-10-CM

## 2025-04-22 DIAGNOSIS — G43.009 MIGRAINE WITHOUT AURA AND WITHOUT STATUS MIGRAINOSUS, NOT INTRACTABLE: ICD-10-CM

## 2025-04-22 DIAGNOSIS — F41.8 DEPRESSION WITH ANXIETY: ICD-10-CM

## 2025-04-22 DIAGNOSIS — M48.061 SPINAL STENOSIS OF LUMBAR REGION AT MULTIPLE LEVELS: ICD-10-CM

## 2025-04-22 DIAGNOSIS — M96.1 POST LAMINECTOMY SYNDROME: ICD-10-CM

## 2025-04-22 DIAGNOSIS — M54.16 LUMBAR RADICULOPATHY: ICD-10-CM

## 2025-04-22 DIAGNOSIS — I10 PRIMARY HYPERTENSION: ICD-10-CM

## 2025-04-22 DIAGNOSIS — F51.01 PRIMARY INSOMNIA: ICD-10-CM

## 2025-04-22 DIAGNOSIS — M48.062 NEUROGENIC CLAUDICATION DUE TO LUMBAR SPINAL STENOSIS: Primary | ICD-10-CM

## 2025-04-22 DIAGNOSIS — M48.062 NEUROGENIC CLAUDICATION DUE TO LUMBAR SPINAL STENOSIS: ICD-10-CM

## 2025-04-22 PROCEDURE — 3074F SYST BP LT 130 MM HG: CPT | Performed by: INTERNAL MEDICINE

## 2025-04-22 PROCEDURE — 4004F PT TOBACCO SCREEN RCVD TLK: CPT | Performed by: INTERNAL MEDICINE

## 2025-04-22 PROCEDURE — 3078F DIAST BP <80 MM HG: CPT | Performed by: INTERNAL MEDICINE

## 2025-04-22 PROCEDURE — 99495 TRANSJ CARE MGMT MOD F2F 14D: CPT | Performed by: INTERNAL MEDICINE

## 2025-04-22 RX ORDER — PREDNISONE 10 MG/1
TABLET ORAL
Qty: 17 TABLET | Refills: 0 | Status: SHIPPED | OUTPATIENT
Start: 2025-04-22 | End: 2025-05-02

## 2025-04-22 RX ORDER — OXYCODONE HYDROCHLORIDE 5 MG/1
5 TABLET ORAL EVERY 6 HOURS PRN
Qty: 28 TABLET | Refills: 0 | Status: SHIPPED | OUTPATIENT
Start: 2025-04-22 | End: 2025-04-29

## 2025-04-22 ASSESSMENT — ENCOUNTER SYMPTOMS
ARTHRALGIAS: 1
FEVER: 0
CARDIOVASCULAR NEGATIVE: 1
CONSTIPATION: 0
COUGH: 0
DIARRHEA: 0
SHORTNESS OF BREATH: 0
MYALGIAS: 1
CHILLS: 0
ACTIVITY CHANGE: 1
ABDOMINAL PAIN: 0
NECK PAIN: 1
DYSURIA: 0
NERVOUS/ANXIOUS: 1
BACK PAIN: 1

## 2025-04-22 ASSESSMENT — PAIN SCALES - GENERAL: PAINLEVEL_OUTOF10: 8

## 2025-04-22 NOTE — PROGRESS NOTES
"Patient: Suly Vance  : 1975  PCP: Michaela Arthur MD  MRN: [id number]  Program: Transitional Care Management  Status: Enrolled  Effective Dates: 2025 - present  Responsible Staff: Marlena Diana  Social Drivers to be Addressed: Physical Activity, Social Connections, Tobacco Use         Suly Vance is a 49 y.o. female presenting today for follow-up after being discharged from the hospital 11 days ago. The main problem requiring admission was spinal stenosis. The discharge summary and/or Transitional Care Management documentation was reviewed. Medication reconciliation was performed as indicated via the \"Franco as Reviewed\" timestamp.     Suly Vance was contacted by Transitional Care Management services two days after her discharge. This encounter and supporting documentation was reviewed.    Admitted for lumbar surgery due to neurogenic claudication from lumbar stenosis. 2 weeks post up--R side in more pain, left side worse. Sent home with oxy 5mg and robaxin. Doesn't think she went home with steroids. Still smoking. Having home care with nursing, OT and PT.  Moving despite the pain. OARRS reviewed 2025    Hypertension-off meds and good .   BP Readings from Last 3 Encounters:   25 108/64   25 (!) 140/92   25 (!) 154/107         Depression with anxiety-taking effexor but always with stress and anxiety--Dad now with dx dementia and having anger issues.  Now having panic attacks related to all the stress.    Migraines-worse with stress-usually 1-2/week-imitrex helps     Dysphagia/GERD-fairly stable on protonix     Tobacco use--continues to smoke after hospital -worse with stress-about 1/2 ppd     Insomnia-using trazadone helps but wakes from pain     Vitamin D def on supplements    Here with     Reviewed hospital records including notes, xray, labs and results     Review of Systems   Constitutional:  Positive for activity change. Negative for chills and fever. "   Respiratory:  Negative for cough and shortness of breath.    Cardiovascular: Negative.    Gastrointestinal:  Negative for abdominal pain, constipation and diarrhea.        Bowels moving good   Genitourinary:  Negative for dysuria.   Musculoskeletal:  Positive for arthralgias, back pain, gait problem, myalgias and neck pain.   Skin:  Negative for rash.   Psychiatric/Behavioral:  The patient is nervous/anxious.        /64 (BP Location: Left arm, Patient Position: Sitting)   Pulse 72   Wt 49.3 kg (108 lb 11.2 oz)   SpO2 97%   BMI 21.23 kg/m²     Physical Exam  Constitutional:       General: She is not in acute distress.     Appearance: Normal appearance.      Comments: Sitting in wheelchair   Cardiovascular:      Rate and Rhythm: Normal rate and regular rhythm.      Heart sounds: Normal heart sounds. No murmur heard.     No gallop.   Pulmonary:      Breath sounds: No wheezing, rhonchi or rales.      Comments: Opening snaps with shallow breaths-discussed deep breathing exercises  Musculoskeletal:      Comments: better strength left leg; diminished on R   Skin:     General: Skin is warm and dry.      Findings: No rash.      Comments: Tatoos; incision on R side-clean, cool, no drainage   Neurological:      Mental Status: She is alert and oriented to person, place, and time.      Motor: Weakness: left leg.      Deep Tendon Reflexes: Reflexes abnormal.   Psychiatric:      Comments: hopeful         The complexity of medical decision making for this patient's transitional care is moderate.    Assessment/Plan  will add prednisone taper-? If R leg worse from inflamation  Diagnoses and all orders for this visit:  Neurogenic claudication due to lumbar spinal stenosis  -     predniSONE (Deltasone) 10 mg tablet; Take 3 tablets (30 mg) by mouth once daily for 2 days, THEN 2 tablets (20 mg) once daily for 3 days, THEN 1 tablet (10 mg) once daily for 5 days.  Mixed hyperlipidemia  Primary hypertension  Seasonal allergic  rhinitis due to pollen  Depression with anxiety  Post laminectomy syndrome  Spinal stenosis of lumbar region at multiple levels  Migraine without aura and without status migrainosus, not intractable  Primary insomnia  Tobacco use disorder    Current Medications[1]        [1]   Current Outpatient Medications:     acetaminophen (Tylenol Extra Strength) 500 mg tablet, Take 2 tablets (1,000 mg) by mouth every 8 hours for 14 days., Disp: , Rfl:     chlorzoxazone (Parafon Forte) 500 mg tablet, Take 1 tablet (500 mg) by mouth 4 times a day as needed for muscle spasms., Disp: 120 tablet, Rfl: 11    cholecalciferol (Vitamin D3) 50 MCG (2000 UT) tablet, Take 1 tablet (2,000 Units) by mouth once daily., Disp: 90 tablet, Rfl: 3    gabapentin (Neurontin) 600 mg tablet, TAKE 1 TABLET(600 MG) BY MOUTH THREE TIMES DAILY, Disp: 270 tablet, Rfl: 3    methocarbamol (Robaxin) 500 mg tablet, Take 1-2 tablets by mouth  every 8 hours as needed for spasms, Disp: 60 tablet, Rfl: 2    multivit-minerals/folic acid (ADULT MULTIVITAMIN GUMMIES ORAL), Take 1 tablet by mouth once daily., Disp: , Rfl:     pantoprazole (ProtoNix) 40 mg EC tablet, TAKE 1 TABLET BY MOUTH EVERY DAY, Disp: 30 tablet, Rfl: 11    polyethylene glycol (Miralax) 17 gram/dose powder, Mix 17 g of powder and drink 2 times a day as needed (constipation)., Disp: , Rfl:     SUMAtriptan (Imitrex) 100 mg tablet, Take 1 tablet (100 mg) by mouth 1 time if needed for migraine. AT LEAST 2 HOURS BETWEEN DOSES AS NEEDED FOR HEADACHE, Disp: 10 tablet, Rfl: 11    traZODone (Desyrel) 100 mg tablet, TAKE 2 TABLETS(200 MG) BY MOUTH EVERY DAY AT BEDTIME, Disp: 60 tablet, Rfl: 11    venlafaxine XR (Effexor-XR) 150 mg 24 hr capsule, Take 1 capsule (150 mg) by mouth once daily. Take with food., Disp: 30 capsule, Rfl: 11    predniSONE (Deltasone) 10 mg tablet, Take 3 tablets (30 mg) by mouth once daily for 2 days, THEN 2 tablets (20 mg) once daily for 3 days, THEN 1 tablet (10 mg) once daily for 5  days., Disp: 17 tablet, Rfl: 0

## 2025-04-22 NOTE — TELEPHONE ENCOUNTER
Patient is currently having right legt pain and low back pain that wakes her up at night. She wants to know if this is normal.

## 2025-04-23 ENCOUNTER — PATIENT OUTREACH (OUTPATIENT)
Dept: PRIMARY CARE | Facility: CLINIC | Age: 50
End: 2025-04-23
Payer: COMMERCIAL

## 2025-04-23 ENCOUNTER — HOME CARE VISIT (OUTPATIENT)
Dept: HOME HEALTH SERVICES | Facility: HOME HEALTH | Age: 50
End: 2025-04-23
Payer: COMMERCIAL

## 2025-04-23 NOTE — PROGRESS NOTES
Confirmation of at least 2 patient identifiers.    Completed telephonic follow-up with patient after recent visit with PCP   Spoke to patient during outreach call.    Patient reports feeling: No change from previous encounter     Patient has questions or concerns about medications: No    Have all prescribed medications been filled? Yes    Patient has necessary resources to manage their care? Yes    Patient has questions or concerns? No    Next care management follow-up approximately within one month.  Care  information provided to patient.    Patient has started new Prednisone today from PCP visit     Encouraged patient to call providers for any questions concerns or change in condition

## 2025-04-25 ENCOUNTER — HOME CARE VISIT (OUTPATIENT)
Dept: HOME HEALTH SERVICES | Facility: HOME HEALTH | Age: 50
End: 2025-04-25
Payer: COMMERCIAL

## 2025-04-25 PROCEDURE — G0157 HHC PT ASSISTANT EA 15: HCPCS | Mod: CQ

## 2025-04-25 ASSESSMENT — ENCOUNTER SYMPTOMS
PAIN: R LE
PAIN: 1
HIGHEST PAIN SEVERITY IN PAST 24 HOURS: 10/10
PERSON REPORTING PAIN: PATIENT
LOWEST PAIN SEVERITY IN PAST 24 HOURS: 6/10

## 2025-04-29 ENCOUNTER — HOME CARE VISIT (OUTPATIENT)
Dept: HOME HEALTH SERVICES | Facility: HOME HEALTH | Age: 50
End: 2025-04-29
Payer: COMMERCIAL

## 2025-04-29 PROCEDURE — G0157 HHC PT ASSISTANT EA 15: HCPCS | Mod: CQ

## 2025-04-29 ASSESSMENT — ENCOUNTER SYMPTOMS
PAIN: 1
HIGHEST PAIN SEVERITY IN PAST 24 HOURS: 10/10
LOWEST PAIN SEVERITY IN PAST 24 HOURS: 4/10
PERSON REPORTING PAIN: PATIENT

## 2025-04-30 ENCOUNTER — HOME CARE VISIT (OUTPATIENT)
Dept: HOME HEALTH SERVICES | Facility: HOME HEALTH | Age: 50
End: 2025-04-30
Payer: COMMERCIAL

## 2025-04-30 VITALS
OXYGEN SATURATION: 99 % | HEART RATE: 72 BPM | TEMPERATURE: 98.6 F | DIASTOLIC BLOOD PRESSURE: 90 MMHG | SYSTOLIC BLOOD PRESSURE: 148 MMHG | RESPIRATION RATE: 18 BRPM

## 2025-04-30 DIAGNOSIS — M54.16 LUMBAR RADICULOPATHY: ICD-10-CM

## 2025-04-30 PROCEDURE — G0299 HHS/HOSPICE OF RN EA 15 MIN: HCPCS

## 2025-04-30 ASSESSMENT — ENCOUNTER SYMPTOMS
DEPRESSION: 0
LOSS OF SENSATION IN FEET: 0
PAIN LOCATION - RELIEVING FACTORS: MEDICATIONS
APPETITE LEVEL: GOOD
SHORTNESS OF BREATH: 1
PAIN LOCATION - PAIN DURATION: CONSTANT
PAIN LOCATION - PAIN FREQUENCY: CONSTANT
FATIGUE: 1
STOOL FREQUENCY: DAILY
HEADACHES: 1
FATIGUES EASILY: 1
AGITATION: 1
PERSON REPORTING PAIN: PATIENT
PAIN SEVERITY GOAL: 0/10
OCCASIONAL FEELINGS OF UNSTEADINESS: 1
PAIN: 1
DYSPNEA ACTIVITY LEVEL: AFTER AMBULATING MORE THAN 20 FT
MUSCLE WEAKNESS: 1
COUGH: 1
LOWEST PAIN SEVERITY IN PAST 24 HOURS: 4/10
BOWEL PATTERN NORMAL: 1
SPUTUM COLOR: TAN
PAIN LOCATION - PAIN SEVERITY: 10/10
TINGLING: 1
SPUTUM PRODUCTION: 1
LAST BOWEL MOVEMENT: 67325
LOWER EXTREMITY EDEMA: 1
PAIN LOCATION: RIGHT HIP
HIGHEST PAIN SEVERITY IN PAST 24 HOURS: 10/10
SUBJECTIVE PAIN PROGRESSION: UNCHANGED
PAIN LOCATION - EXACERBATING FACTORS: MOVEMENT
CHANGE IN APPETITE: UNCHANGED

## 2025-04-30 ASSESSMENT — ACTIVITIES OF DAILY LIVING (ADL)
MONEY MANAGEMENT (EXPENSES/BILLS): INDEPENDENT
AMBULATION ASSISTANCE: STAND BY ASSIST
AMBULATION ASSISTANCE: 1

## 2025-05-01 ENCOUNTER — APPOINTMENT (OUTPATIENT)
Dept: ORTHOPEDIC SURGERY | Facility: CLINIC | Age: 50
End: 2025-05-01
Payer: COMMERCIAL

## 2025-05-01 ENCOUNTER — HOSPITAL ENCOUNTER (OUTPATIENT)
Dept: RADIOLOGY | Facility: HOSPITAL | Age: 50
Discharge: HOME | End: 2025-05-01
Payer: COMMERCIAL

## 2025-05-01 ENCOUNTER — HOME CARE VISIT (OUTPATIENT)
Dept: HOME HEALTH SERVICES | Facility: HOME HEALTH | Age: 50
End: 2025-05-01
Payer: COMMERCIAL

## 2025-05-01 DIAGNOSIS — M54.16 LUMBAR RADICULOPATHY: ICD-10-CM

## 2025-05-01 DIAGNOSIS — M48.062 NEUROGENIC CLAUDICATION DUE TO LUMBAR SPINAL STENOSIS: ICD-10-CM

## 2025-05-01 PROCEDURE — G0157 HHC PT ASSISTANT EA 15: HCPCS | Mod: CQ

## 2025-05-01 PROCEDURE — 72100 X-RAY EXAM L-S SPINE 2/3 VWS: CPT

## 2025-05-01 PROCEDURE — 99024 POSTOP FOLLOW-UP VISIT: CPT | Performed by: PHYSICIAN ASSISTANT

## 2025-05-01 RX ORDER — AMITRIPTYLINE HYDROCHLORIDE 25 MG/1
25 TABLET, FILM COATED ORAL NIGHTLY
Qty: 30 TABLET | Refills: 1 | Status: SHIPPED | OUTPATIENT
Start: 2025-05-01 | End: 2025-06-30

## 2025-05-01 RX ORDER — OXYCODONE HYDROCHLORIDE 5 MG/1
5 TABLET ORAL EVERY 6 HOURS PRN
Qty: 28 TABLET | Refills: 0 | Status: SHIPPED | OUTPATIENT
Start: 2025-05-01 | End: 2025-05-08

## 2025-05-01 RX ORDER — PREDNISONE 20 MG/1
20 TABLET ORAL DAILY
Qty: 7 TABLET | Refills: 0 | Status: SHIPPED | OUTPATIENT
Start: 2025-05-01

## 2025-05-01 ASSESSMENT — ENCOUNTER SYMPTOMS
PAIN: 1
HIGHEST PAIN SEVERITY IN PAST 24 HOURS: 8/10
PERSON REPORTING PAIN: PATIENT
LOWEST PAIN SEVERITY IN PAST 24 HOURS: 5/10

## 2025-05-01 ASSESSMENT — PAIN - FUNCTIONAL ASSESSMENT: PAIN_FUNCTIONAL_ASSESSMENT: 0-10

## 2025-05-01 ASSESSMENT — PAIN SCALES - GENERAL: PAINLEVEL_OUTOF10: 6

## 2025-05-02 ENCOUNTER — PATIENT OUTREACH (OUTPATIENT)
Dept: PRIMARY CARE | Facility: CLINIC | Age: 50
End: 2025-05-02
Payer: COMMERCIAL

## 2025-05-02 NOTE — PROGRESS NOTES
Suly is 3 weeks postop from a L3-4 XLIF performed by Dr. Enriquez on 4/10.    She is gradually recovering from surgery.  Her left leg radiculopathy has significantly improved.  She has developed new right leg symptoms.  The symptoms are fairly consistent with pain after an XLIF due to manipulation of the psoas muscle.  She has pain in her groin that radiates into her anterior thigh.  She mentions this pain is so severe that she is miserable and having difficulty sleeping.  She has physical therapy coming to the house.  She is ambulating as much she can tolerate.    On exam, slow balance gait.  Strength intact in the lower extremities bilaterally.  Incision healing well, no evidence of infection.  Prineo mesh removed today.    I personally reviewed x-rays of the lumbar spine completed today.  There is no evidence of acute fracture or hardware.  Stable alignment of lateral lumbar fusion.    She can continue to increase her activities as tolerated.  She should start to bend, lift, and twist.  She can increase her weight restriction 5 pounds per week.  A prescription for prednisone 20 mg was sent to her pharmacy for inflammation.  We are also going to try some amitriptyline at bedtime to help with nerve pain and allow her to sleep.  She did request a refill of her oxycodone.  OARRS history was reviewed by me and there is no evidence of abnormal narcotic prescription history.  She will follow-up with me in 3 months with repeat x-rays, lumbar AP and lateral only.    **This note was dictated using speech recognition software and was not corrected for spelling or grammatical errors**

## 2025-05-06 ENCOUNTER — HOME CARE VISIT (OUTPATIENT)
Dept: HOME HEALTH SERVICES | Facility: HOME HEALTH | Age: 50
End: 2025-05-06
Payer: COMMERCIAL

## 2025-05-06 PROCEDURE — G0157 HHC PT ASSISTANT EA 15: HCPCS | Mod: CQ

## 2025-05-06 ASSESSMENT — BALANCE ASSESSMENTS
SITTING BALANCE: 1 - STEADY, SAFE
NUDGED SCORE: 2
ARISING SCORE: 1
STANDING BALANCE: 2 - NARROW STANCE WITHOUT SUPPORT
EYES CLOSED AT MAXIMUM POSITION NUDGED: 1 - STEADY
ARISES: 1 - ABLE, USES ARMS TO HELP
IMMEDIATE STANDING BALANCE FIRST 5 SECONDS: 2 - STEADY WITHOUT WALKER OR OTHER SUPPORT
TURNING 360 DEGREES STEPS: 1 - CONTINUOUS STEPS
BALANCE SCORE: 14
ATTEMPTS TO ARISE: 2 - ABLE TO RISE, ONE ATTEMPT
SITTING DOWN: 1 - USES ARMS OR NOT SMOOTH MOTION
NUDGED: 2 - STEADY

## 2025-05-06 ASSESSMENT — ENCOUNTER SYMPTOMS
LOWEST PAIN SEVERITY IN PAST 24 HOURS: 3/10
PAIN: 1
PERSON REPORTING PAIN: PATIENT
HIGHEST PAIN SEVERITY IN PAST 24 HOURS: 8/10

## 2025-05-06 ASSESSMENT — GAIT ASSESSMENTS
STEP CONTINUITY: 1 - STEPS APPEAR CONTINUOUS
STEP SYMMETRY: 1 - RIGHT AND LEFT STEP LENGTH APPEAR EQUAL
GAIT SCORE: 10
BALANCE AND GAIT SCORE: 24
INITIATION OF GAIT IMMEDIATELY AFTER GO: 0 - ANY HESITANCY OR MULTIPLE ATTEMPTS TO START
PATH: 1 - MILD/MODERATE DEVIATION OR USES WALKING AID
TRUNK SCORE: 2
PATH SCORE: 1
WALKING STANCE: 1 - HEELS ALMOST TOUCHING WHILE WALKING
TRUNK: 2 - NO SWAY, NO FLEXION, NO USE OF ARMS, NO WALKING AID

## 2025-05-07 ENCOUNTER — HOME CARE VISIT (OUTPATIENT)
Dept: HOME HEALTH SERVICES | Facility: HOME HEALTH | Age: 50
End: 2025-05-07
Payer: COMMERCIAL

## 2025-05-07 VITALS
DIASTOLIC BLOOD PRESSURE: 70 MMHG | OXYGEN SATURATION: 96 % | SYSTOLIC BLOOD PRESSURE: 138 MMHG | HEART RATE: 72 BPM | TEMPERATURE: 98.7 F | RESPIRATION RATE: 12 BRPM

## 2025-05-07 PROCEDURE — G0299 HHS/HOSPICE OF RN EA 15 MIN: HCPCS

## 2025-05-07 ASSESSMENT — ENCOUNTER SYMPTOMS
LAST BOWEL MOVEMENT: 67332
BOWEL PATTERN NORMAL: 1
SPUTUM PRODUCTION: 1
PAIN LOCATION - PAIN FREQUENCY: CONSTANT
STOOL FREQUENCY: DAILY
DYSPNEA ACTIVITY LEVEL: AFTER AMBULATING MORE THAN 20 FT
LOWEST PAIN SEVERITY IN PAST 24 HOURS: 4/10
LOSS OF SENSATION IN FEET: 0
PAIN LOCATION - PAIN SEVERITY: 7/10
PAIN LOCATION - EXACERBATING FACTORS: SITTING
COUGH: 1
FORGETFULNESS: 1
DEPRESSION: 0
OCCASIONAL FEELINGS OF UNSTEADINESS: 0
SUBJECTIVE PAIN PROGRESSION: GRADUALLY IMPROVING
SPUTUM COLOR: YELLOW
APPETITE LEVEL: GOOD
MUSCLE WEAKNESS: 1
PAIN LOCATION: RIGHT LEG
SHORTNESS OF BREATH: 1
PAIN: 1
CHANGE IN APPETITE: UNCHANGED
FATIGUE: 1
PAIN LOCATION - PAIN DURATION: CONSTANT
PERSON REPORTING PAIN: PATIENT
PAIN LOCATION - RELIEVING FACTORS: WALKING
PAIN SEVERITY GOAL: 0/10
SPUTUM AMOUNT: SCANT

## 2025-05-07 ASSESSMENT — ACTIVITIES OF DAILY LIVING (ADL)
AMBULATION ASSISTANCE: 1
AMBULATION ASSISTANCE: STAND BY ASSIST

## 2025-05-08 ENCOUNTER — HOME CARE VISIT (OUTPATIENT)
Dept: HOME HEALTH SERVICES | Facility: HOME HEALTH | Age: 50
End: 2025-05-08
Payer: COMMERCIAL

## 2025-05-08 ENCOUNTER — PHARMACY VISIT (OUTPATIENT)
Dept: PHARMACY | Facility: CLINIC | Age: 50
End: 2025-05-08
Payer: COMMERCIAL

## 2025-05-08 VITALS
OXYGEN SATURATION: 97 % | DIASTOLIC BLOOD PRESSURE: 84 MMHG | RESPIRATION RATE: 18 BRPM | SYSTOLIC BLOOD PRESSURE: 120 MMHG | TEMPERATURE: 98.2 F | HEART RATE: 74 BPM

## 2025-05-08 PROCEDURE — G0157 HHC PT ASSISTANT EA 15: HCPCS | Mod: CQ

## 2025-05-08 PROCEDURE — RXMED WILLOW AMBULATORY MEDICATION CHARGE

## 2025-05-08 ASSESSMENT — ENCOUNTER SYMPTOMS
HIGHEST PAIN SEVERITY IN PAST 24 HOURS: 7/10
LOWEST PAIN SEVERITY IN PAST 24 HOURS: 3/10
PAIN: 1
PERSON REPORTING PAIN: PATIENT

## 2025-05-09 DIAGNOSIS — M54.16 LUMBAR RADICULOPATHY: ICD-10-CM

## 2025-05-09 DIAGNOSIS — M48.062 NEUROGENIC CLAUDICATION DUE TO LUMBAR SPINAL STENOSIS: ICD-10-CM

## 2025-05-09 RX ORDER — OXYCODONE HYDROCHLORIDE 5 MG/1
5 TABLET ORAL EVERY 8 HOURS PRN
Qty: 21 TABLET | Refills: 0 | Status: SHIPPED | OUTPATIENT
Start: 2025-05-09 | End: 2025-05-16

## 2025-05-09 RX ORDER — PREDNISONE 20 MG/1
20 TABLET ORAL DAILY
Qty: 7 TABLET | Refills: 0 | Status: SHIPPED | OUTPATIENT
Start: 2025-05-09

## 2025-05-13 ENCOUNTER — HOME CARE VISIT (OUTPATIENT)
Dept: HOME HEALTH SERVICES | Facility: HOME HEALTH | Age: 50
End: 2025-05-13
Payer: COMMERCIAL

## 2025-05-13 VITALS
DIASTOLIC BLOOD PRESSURE: 60 MMHG | HEART RATE: 74 BPM | RESPIRATION RATE: 18 BRPM | SYSTOLIC BLOOD PRESSURE: 110 MMHG | OXYGEN SATURATION: 97 %

## 2025-05-13 PROCEDURE — G0157 HHC PT ASSISTANT EA 15: HCPCS | Mod: CQ

## 2025-05-13 ASSESSMENT — GAIT ASSESSMENTS
GAIT SCORE: 10
STEP SYMMETRY: 1 - RIGHT AND LEFT STEP LENGTH APPEAR EQUAL
STEP CONTINUITY: 1 - STEPS APPEAR CONTINUOUS
TRUNK SCORE: 1
PATH: 1 - MILD/MODERATE DEVIATION OR USES WALKING AID
PATH SCORE: 1
WALKING STANCE: 1 - HEELS ALMOST TOUCHING WHILE WALKING
TRUNK: 1 - NO SWAY BUT FLEXION OF KNEES OR BACK OR SPREADS ARMS WHILE WALKING
BALANCE AND GAIT SCORE: 25
INITIATION OF GAIT IMMEDIATELY AFTER GO: 1 - NO HESITANCY

## 2025-05-13 ASSESSMENT — BALANCE ASSESSMENTS
BALANCE SCORE: 15
EYES CLOSED AT MAXIMUM POSITION NUDGED: 1 - STEADY
ARISES: 1 - ABLE, USES ARMS TO HELP
NUDGED: 2 - STEADY
STANDING BALANCE: 2 - NARROW STANCE WITHOUT SUPPORT
ATTEMPTS TO ARISE: 2 - ABLE TO RISE, ONE ATTEMPT
SITTING DOWN: 2 - SAFE, SMOOTH MOTION
TURNING 360 DEGREES STEPS: 1 - CONTINUOUS STEPS
ARISING SCORE: 1
NUDGED SCORE: 2
SITTING BALANCE: 1 - STEADY, SAFE
IMMEDIATE STANDING BALANCE FIRST 5 SECONDS: 2 - STEADY WITHOUT WALKER OR OTHER SUPPORT

## 2025-05-13 ASSESSMENT — ENCOUNTER SYMPTOMS
PAIN: 1
HIGHEST PAIN SEVERITY IN PAST 24 HOURS: 8/10
LOWEST PAIN SEVERITY IN PAST 24 HOURS: 3/10
PERSON REPORTING PAIN: PATIENT

## 2025-05-16 DIAGNOSIS — M54.16 LUMBAR RADICULOPATHY: Primary | ICD-10-CM

## 2025-05-16 DIAGNOSIS — M54.16 LUMBAR RADICULOPATHY: ICD-10-CM

## 2025-05-16 DIAGNOSIS — M48.062 NEUROGENIC CLAUDICATION DUE TO LUMBAR SPINAL STENOSIS: ICD-10-CM

## 2025-05-16 NOTE — TELEPHONE ENCOUNTER
Patient is having a lot of right side nerve pain starting in the groin area down the inside of right thigh and down her leg. It bothers her so much she can even put on tight clothing. She has finished the steroids and wants to know what else she can do to help for this pain.

## 2025-05-17 ENCOUNTER — HOME CARE VISIT (OUTPATIENT)
Dept: HOME HEALTH SERVICES | Facility: HOME HEALTH | Age: 50
End: 2025-05-17
Payer: COMMERCIAL

## 2025-05-17 ENCOUNTER — APPOINTMENT (OUTPATIENT)
Dept: HOME HEALTH SERVICES | Facility: HOME HEALTH | Age: 50
End: 2025-05-17
Payer: COMMERCIAL

## 2025-05-17 SDOH — HEALTH STABILITY: PHYSICAL HEALTH: EXERCISE TYPE: INDEPENDENT

## 2025-05-17 ASSESSMENT — ACTIVITIES OF DAILY LIVING (ADL)
AMBULATION ASSISTANCE: 1
OASIS_M1830: 00
PHYSICAL TRANSFERS ASSESSED: 1
CURRENT_FUNCTION: INDEPENDENT
AMBULATION ASSISTANCE: INDEPENDENT
HOME_HEALTH_OASIS: 00

## 2025-05-21 RX ORDER — OXYCODONE HYDROCHLORIDE 5 MG/1
5 TABLET ORAL EVERY 12 HOURS PRN
Qty: 14 TABLET | Refills: 0 | Status: SHIPPED | OUTPATIENT
Start: 2025-05-21 | End: 2025-05-28

## 2025-05-22 DIAGNOSIS — M54.16 LUMBAR RADICULOPATHY: ICD-10-CM

## 2025-05-27 ENCOUNTER — APPOINTMENT (OUTPATIENT)
Dept: RADIOLOGY | Facility: CLINIC | Age: 50
End: 2025-05-27
Payer: COMMERCIAL

## 2025-05-27 DIAGNOSIS — M54.16 LUMBAR RADICULOPATHY: ICD-10-CM

## 2025-05-27 PROCEDURE — 72148 MRI LUMBAR SPINE W/O DYE: CPT | Performed by: STUDENT IN AN ORGANIZED HEALTH CARE EDUCATION/TRAINING PROGRAM

## 2025-05-27 PROCEDURE — 72148 MRI LUMBAR SPINE W/O DYE: CPT

## 2025-05-28 ENCOUNTER — HOSPITAL ENCOUNTER (OUTPATIENT)
Dept: RADIOLOGY | Facility: CLINIC | Age: 50
Discharge: HOME | End: 2025-05-28
Payer: COMMERCIAL

## 2025-05-28 ENCOUNTER — OFFICE VISIT (OUTPATIENT)
Dept: ORTHOPEDIC SURGERY | Facility: CLINIC | Age: 50
End: 2025-05-28
Payer: COMMERCIAL

## 2025-05-28 DIAGNOSIS — M54.16 LUMBAR RADICULOPATHY: ICD-10-CM

## 2025-05-28 DIAGNOSIS — M54.16 LUMBAR RADICULOPATHY: Primary | ICD-10-CM

## 2025-05-28 PROCEDURE — 99211 OFF/OP EST MAY X REQ PHY/QHP: CPT | Performed by: PHYSICIAN ASSISTANT

## 2025-05-28 PROCEDURE — 72100 X-RAY EXAM L-S SPINE 2/3 VWS: CPT

## 2025-05-28 PROCEDURE — 72100 X-RAY EXAM L-S SPINE 2/3 VWS: CPT | Performed by: RADIOLOGY

## 2025-05-28 PROCEDURE — 4004F PT TOBACCO SCREEN RCVD TLK: CPT | Performed by: PHYSICIAN ASSISTANT

## 2025-05-28 ASSESSMENT — PAIN SCALES - GENERAL: PAINLEVEL_OUTOF10: 5 - MODERATE PAIN

## 2025-05-28 ASSESSMENT — PAIN - FUNCTIONAL ASSESSMENT: PAIN_FUNCTIONAL_ASSESSMENT: 0-10

## 2025-05-28 NOTE — PROGRESS NOTES
Suly returns clinic today to review her MRI.  She is 6 weeks postop from a L3-4 XLIF performed by Dr. Enriquez on 4/10.    She continues to have pain in her quadriceps bilaterally.  When standing her legs are going numb.  She is worried about returning to work with these symptoms.    Dr. Enriquez and I both reviewed an MRI of the lumbar spine completed yesterday and plain films of the lumbar spine completed today.  There is no evidence of acute fracture or hardware failure.  MRI does not show significant residual central canal stenosis.  Well decompressed at L3-4.    I discussed with her that her symptoms are likely residual nerve inflammation.  Nerves heal very slowly and this can take up to 1 year.  In the meantime I did recommend she follow-up with pain management, she is not interested in returning to her previous provider, she was given a new referral to Dr. Ann.  We would like her to have a repeat epidural injection while things are still healing.  She will follow-up with me as previously scheduled with repeat x-rays, lumbar AP and lateral only.    **This note was dictated using speech recognition software and was not corrected for spelling or grammatical errors**

## 2025-06-03 ENCOUNTER — OFFICE VISIT (OUTPATIENT)
Dept: PRIMARY CARE | Facility: CLINIC | Age: 50
End: 2025-06-03
Payer: COMMERCIAL

## 2025-06-03 VITALS
WEIGHT: 111 LBS | BODY MASS INDEX: 21.79 KG/M2 | HEIGHT: 60 IN | SYSTOLIC BLOOD PRESSURE: 112 MMHG | HEART RATE: 96 BPM | OXYGEN SATURATION: 98 % | DIASTOLIC BLOOD PRESSURE: 78 MMHG

## 2025-06-03 DIAGNOSIS — M48.062 NEUROGENIC CLAUDICATION DUE TO LUMBAR SPINAL STENOSIS: ICD-10-CM

## 2025-06-03 DIAGNOSIS — M54.16 LUMBAR RADICULOPATHY: ICD-10-CM

## 2025-06-03 PROCEDURE — 3074F SYST BP LT 130 MM HG: CPT | Performed by: INTERNAL MEDICINE

## 2025-06-03 PROCEDURE — 3008F BODY MASS INDEX DOCD: CPT | Performed by: INTERNAL MEDICINE

## 2025-06-03 PROCEDURE — 99214 OFFICE O/P EST MOD 30 MIN: CPT | Performed by: INTERNAL MEDICINE

## 2025-06-03 PROCEDURE — 3078F DIAST BP <80 MM HG: CPT | Performed by: INTERNAL MEDICINE

## 2025-06-03 PROCEDURE — 4004F PT TOBACCO SCREEN RCVD TLK: CPT | Performed by: INTERNAL MEDICINE

## 2025-06-03 RX ORDER — NALOXONE HYDROCHLORIDE 4 MG/.1ML
1 SPRAY NASAL AS NEEDED
Qty: 2 EACH | Refills: 0 | Status: SHIPPED | OUTPATIENT
Start: 2025-06-03

## 2025-06-03 RX ORDER — TOPIRAMATE 25 MG/1
25 TABLET, FILM COATED ORAL 2 TIMES DAILY
Qty: 60 TABLET | Refills: 5 | Status: SHIPPED | OUTPATIENT
Start: 2025-06-03 | End: 2025-11-30

## 2025-06-03 RX ORDER — OXYCODONE HYDROCHLORIDE 5 MG/1
5 TABLET ORAL EVERY 6 HOURS PRN
Qty: 60 TABLET | Refills: 0 | Status: SHIPPED | OUTPATIENT
Start: 2025-06-03 | End: 2025-06-23

## 2025-06-03 RX ORDER — BACLOFEN 20 MG/1
20 TABLET ORAL 2 TIMES DAILY
Qty: 60 TABLET | Refills: 5 | Status: SHIPPED | OUTPATIENT
Start: 2025-06-03 | End: 2025-11-30

## 2025-06-03 RX ORDER — TOPIRAMATE 25 MG/1
25 TABLET, FILM COATED ORAL 2 TIMES DAILY
Qty: 180 TABLET | OUTPATIENT
Start: 2025-06-03

## 2025-06-03 ASSESSMENT — ENCOUNTER SYMPTOMS
DEPRESSION: 0
OCCASIONAL FEELINGS OF UNSTEADINESS: 0
LOSS OF SENSATION IN FEET: 0

## 2025-06-03 ASSESSMENT — PAIN SCALES - GENERAL: PAINLEVEL_OUTOF10: 5

## 2025-06-03 NOTE — PROGRESS NOTES
Subjective   Patient ID: Suly Vance is a 50 y.o. female who presents for nerve pain (legs).    Saw surgeon-advised hardware looks good but the nerve damage can last over a year. Having pain down both leg and having muscle spasms that you can see. Having burning pain. Can only walk short distances. Back still hurts. Can't work or drive. Has pain management appt 6/19. Steroids did help, muscle relaxant-robaxin not helping. Baclofen helped a little    Reviewed follow  up xray and MRI of lumbar spine, surgeon note    Reviewed OARRS 6/3/2025. Defer pain agreement since short term til sees pain management             Objective   /78   Pulse 96   Ht 1.524 m (5')   Wt 50.3 kg (111 lb)   SpO2 98%   BMI 21.68 kg/m²         Assessment/Plan   Assessment & Plan  Neurogenic claudication due to lumbar spinal stenosis    Orders:    oxyCODONE (Roxicodone) 5 mg immediate release tablet; Take 1 tablet (5 mg) by mouth every 6 hours if needed for severe pain (7 - 10) for up to 20 days.    topiramate (Topamax) 25 mg tablet; Take 1 tablet (25 mg) by mouth 2 times a day.  since can't work currently will retry topomax  Lumbar radiculopathy    Orders:    oxyCODONE (Roxicodone) 5 mg immediate release tablet; Take 1 tablet (5 mg) by mouth every 6 hours if needed for severe pain (7 - 10) for up to 20 days.    baclofen (Lioresal) 20 mg tablet; Take 1 tablet (20 mg) by mouth 2 times a day.    naloxone (Narcan) 4 mg/0.1 mL nasal spray; Administer 1 spray (4 mg) into affected nostril(s) if needed for opioid reversal. May repeat every 2-3 minutes if needed, alternating nostrils, until medical assistance becomes available.  go back to baclofen-worked better than robaxin

## 2025-06-03 NOTE — ASSESSMENT & PLAN NOTE
Orders:    oxyCODONE (Roxicodone) 5 mg immediate release tablet; Take 1 tablet (5 mg) by mouth every 6 hours if needed for severe pain (7 - 10) for up to 20 days.    topiramate (Topamax) 25 mg tablet; Take 1 tablet (25 mg) by mouth 2 times a day.  since can't work currently will retry topomax

## 2025-06-19 ENCOUNTER — OFFICE VISIT (OUTPATIENT)
Dept: PAIN MEDICINE | Facility: HOSPITAL | Age: 50
End: 2025-06-19
Payer: COMMERCIAL

## 2025-06-19 DIAGNOSIS — M54.16 LUMBAR RADICULOPATHY: ICD-10-CM

## 2025-06-19 PROCEDURE — 99214 OFFICE O/P EST MOD 30 MIN: CPT | Performed by: ANESTHESIOLOGY

## 2025-06-19 PROCEDURE — 99204 OFFICE O/P NEW MOD 45 MIN: CPT | Performed by: ANESTHESIOLOGY

## 2025-06-19 ASSESSMENT — PAIN SCALES - GENERAL: PAINLEVEL_OUTOF10: 7

## 2025-06-19 ASSESSMENT — PAIN - FUNCTIONAL ASSESSMENT: PAIN_FUNCTIONAL_ASSESSMENT: 0-10

## 2025-06-19 NOTE — PROGRESS NOTES
Subjective   Patient ID: Suly Vance is a 50 y.o. female presenting with complaint of lower back pain with radicular symptoms down bilateral lower extremities.      HPI:   Suly Vance is a 50 y.o. female presenting with complaint of lower back pain with radicular symptoms down bilateral lower extremities.  Patient is status post L3-4 XLIF performed by Dr. Enriquez on 4/10/2025.  Patient describes her pain as rising up to 9 out of 10 intensity and sharp in nature.  Patient says her pain is most severe when she is attempting to walk.  Patient describes areas of allodynia along the medial thighs, as well as decreased sensation to light touch on the more anterior aspects of the bilateral thighs.  Patient is reportedly using oxycodone, gabapentin, amitriptyline, topiramate for pain control.  Patient is reportedly still smoking cigarettes daily.    Review of Systems   13-point ROS done and negative except for HPI.     Current Outpatient Medications   Medication Instructions    amitriptyline (ELAVIL) 25 mg, oral, Nightly    baclofen (LIORESAL) 20 mg, oral, 2 times daily    chlorzoxazone (PARAFON FORTE) 500 mg, oral, 4 times daily PRN    cholecalciferol (VITAMIN D3) 2,000 Units, oral, Daily    gabapentin (Neurontin) 600 mg tablet TAKE 1 TABLET(600 MG) BY MOUTH THREE TIMES DAILY    multivit-minerals/folic acid (ADULT MULTIVITAMIN GUMMIES ORAL) 1 tablet, Daily    naloxone (NARCAN) 4 mg, nasal, As needed, May repeat every 2-3 minutes if needed, alternating nostrils, until medical assistance becomes available.    oxyCODONE (ROXICODONE) 5 mg, oral, Every 6 hours PRN    pantoprazole (PROTONIX) 40 mg, oral, Daily    SUMAtriptan (IMITREX) 100 mg, oral, Once as needed, AT LEAST 2 HOURS BETWEEN DOSES AS NEEDED FOR HEADACHE    topiramate (TOPAMAX) 25 mg, oral, 2 times daily    traZODone (Desyrel) 100 mg tablet TAKE 2 TABLETS(200 MG) BY MOUTH EVERY DAY AT BEDTIME    venlafaxine XR (EFFEXOR-XR) 150 mg, oral, Daily, Take with food.        Medical History[1]     Surgical History[2]     Family History[3]     RX Allergies[4]     Objective     There were no vitals filed for this visit.     Physical Exam  General: NAD, well groomed, well nourished  Eyes: Non-icteric sclera, EOMI  Ears, Nose, Mouth, and Throat: External ears and nose appear to be without deformity or rash. No lesions or masses noted. Hearing is grossly intact.   Neck: Trachea midline  Respiratory: Nonlabored breathing   Cardiovascular: No noted significant peripheral edema   Skin: No rashes or open lesions/ulcers identified on skin.    Areas of allodynia along the medial thighs, as well as decreased sensation to light touch on the more anterior aspects of the bilateral thighs.    Straight leg raise: does not reproduce their pain, bilaterally       Psychiatric: Alert, orientation to person, place, and time. Cooperative.    Imaging personally reviewed and independently interpreted    Assessment/Plan   Suly Vance is a 50 y.o. female presenting with complaint of lower back pain with radicular symptoms down bilateral lower extremities.    Plan:  - Patient to be scheduled for bilateral L3 lumbar transforaminal epidural steroid injections under fluoroscopy    Medical Necessity  The patient has failed conservative treatment including different classes of medications and physical therapy.  Patient continues to rate their pain as moderate to severe, affecting quality of sleep, quality of life and  significantly impairing daily activities (ADLs)   We discussed  the risks, benefits and alternatives of the procedure including but not limited to: Lack of efficacy , Transiently worsening pain , Bleeding, Infection , and Nerve Damage and patient is amicable to the plan    Follow up: As needed     The patient was invited to contact us back anytime with any questions or concerns and follow-up with us in the office as needed.     Diagnoses and all orders for this visit:  Lumbar radiculopathy  -      "Referral to Pain Medicine      This note was generated with the aid of dictation software, there may be typos despite my attempts at proofreading.         [1]   Past Medical History:  Diagnosis Date    Anxiety     Arthritis     Chronic pain disorder     Fractures     GERD (gastroesophageal reflux disease)     Headache     Hyperlipidemia     Hypertension     Joint pain     Lumbar disc disease     Neuromuscular disorder (Multi)     Pneumonia     Seizure (Multi) 2002    X3 - immediately post-partum - no recurrence    Spinal stenosis    [2]   Past Surgical History:  Procedure Laterality Date     SECTION, LOW TRANSVERSE      LUMBAR FUSION      LUMBAR SPINE SURGERY      OTHER SURGICAL HISTORY      Removal spine hardware    SPINAL FUSION      TONSILLECTOMY     [3]   Family History  Problem Relation Name Age of Onset    Cancer Mother Emily vasu     Stroke Mother Honeoye vasu     Hypertension Mother Emily vasu     Cervical cancer Mother Honeoye vasu     Mental illness Mother Honeoye vasu     Arthritis Mother Honeoye vasu     Depression Mother Emily vasu     Hypertension Father Jasvir Vegas     Arthritis Father Jasvir Vegas     Diabetes Father Jasvir Vegas     Cancer Sister      Cervical cancer Sister      Heart disease Maternal Grandmother      Cancer Paternal Grandmother Kathryn Harrigill     Breast cancer Paternal Grandmother Kathryn Harrigill         metastatic    Parkinsonism Paternal Grandfather     [4]   Allergies  Allergen Reactions    Pregabalin Other     Unable to void    Duloxetine Other and Tinnitus     \"Moppy\"    Other Other     UV RAYS -HIVES WITH PROLONGED EXPOSURE    Pseudoephedrine Other     Bloody nose    Topiramate Other     Foggy headed    Zofran [Ondansetron Hcl] Headache    Nicotine Rash     patch    Nsaids (Non-Steroidal Anti-Inflammatory Drug) GI Upset and Unknown     " Once pain under better control, would recommend to reinitiate PT.    - There is no high-grade surgical lesion at this time.  Recommend stop smoking.  If she does not get better with this intervention we can potentially consider neuromodulation down the line in the form of stimulator.  Currently it is too early after surgery and lesser invasive interventions have not been exhausted.    Medical Necessity  The patient has failed conservative treatment including different classes of medications and physical therapy.  Patient continues to rate their pain as moderate to severe, affecting quality of sleep, quality of life and  significantly impairing daily activities (ADLs)   We discussed  the risks, benefits and alternatives of the procedure including but not limited to: Lack of efficacy , Transiently worsening pain , Bleeding, Infection , and Nerve Damage and patient is amicable to the plan    Follow up: As needed     The patient was invited to contact us back anytime with any questions or concerns and follow-up with us in the office as needed.     Diagnoses and all orders for this visit:  Lumbar radiculopathy  -     Referral to Pain Medicine      This note was generated with the aid of dictation software, there may be typos despite my attempts at proofreading.           [1]   Past Medical History:  Diagnosis Date    Anxiety     Arthritis     Chronic pain disorder     Fractures     GERD (gastroesophageal reflux disease)     Headache     Hyperlipidemia     Hypertension     Joint pain     Lumbar disc disease     Neuromuscular disorder (Multi)     Pneumonia     Seizure (Multi) 2002    X3 - immediately post-partum - no recurrence    Spinal stenosis    [2]   Past Surgical History:  Procedure Laterality Date     SECTION, LOW TRANSVERSE      LUMBAR FUSION      LUMBAR SPINE SURGERY      OTHER SURGICAL HISTORY  2008    Removal spine hardware    SPINAL FUSION      TONSILLECTOMY     [3]   Family  "History  Problem Relation Name Age of Onset    Cancer Mother Emily leone     Stroke Mother Emily leone     Hypertension Mother Emily leone     Cervical cancer Mother Emily leone     Mental illness Mother Emily vasu     Arthritis Mother Emily leone     Depression Mother Emily leone     Hypertension Father Jasvir Vegas     Arthritis Father Jasvir Vegas     Diabetes Father Jasvir Vegas     Cancer Sister      Cervical cancer Sister      Heart disease Maternal Grandmother      Cancer Paternal Grandmother Kathryn Harrigill     Breast cancer Paternal Grandmother Kathryn Harrigill         metastatic    Parkinsonism Paternal Grandfather     [4]   Allergies  Allergen Reactions    Pregabalin Other     Unable to void    Duloxetine Other and Tinnitus     \"Moppy\"    Other Other     UV RAYS -HIVES WITH PROLONGED EXPOSURE    Pseudoephedrine Other     Bloody nose    Topiramate Other     Foggy headed    Zofran [Ondansetron Hcl] Headache    Nicotine Rash     patch    Nsaids (Non-Steroidal Anti-Inflammatory Drug) GI Upset and Unknown     "

## 2025-06-20 ENCOUNTER — PATIENT OUTREACH (OUTPATIENT)
Dept: PRIMARY CARE | Facility: CLINIC | Age: 50
End: 2025-06-20
Payer: COMMERCIAL

## 2025-07-03 DIAGNOSIS — M54.16 LUMBAR RADICULOPATHY: ICD-10-CM

## 2025-07-03 DIAGNOSIS — M48.062 NEUROGENIC CLAUDICATION DUE TO LUMBAR SPINAL STENOSIS: ICD-10-CM

## 2025-07-03 RX ORDER — AMITRIPTYLINE HYDROCHLORIDE 25 MG/1
25 TABLET, FILM COATED ORAL NIGHTLY
Qty: 30 TABLET | Refills: 1 | Status: SHIPPED | OUTPATIENT
Start: 2025-07-03 | End: 2025-09-01

## 2025-07-03 NOTE — TELEPHONE ENCOUNTER
Rx Refill Request Telephone Encounter    Name:  Suly Vance  :  [id number]  Medication Name:  Oxycodone 5mg Instant release  Dose : 5 mg   Route : oral    Specific Pharmacy location:  [facility]

## 2025-07-11 ENCOUNTER — APPOINTMENT (OUTPATIENT)
Facility: HOSPITAL | Age: 50
End: 2025-07-11
Payer: COMMERCIAL

## 2025-07-22 ENCOUNTER — APPOINTMENT (OUTPATIENT)
Dept: PRIMARY CARE | Facility: CLINIC | Age: 50
End: 2025-07-22
Payer: COMMERCIAL

## 2025-07-25 ENCOUNTER — OFFICE VISIT (OUTPATIENT)
Dept: PRIMARY CARE | Facility: CLINIC | Age: 50
End: 2025-07-25
Payer: COMMERCIAL

## 2025-07-25 VITALS
HEART RATE: 96 BPM | SYSTOLIC BLOOD PRESSURE: 114 MMHG | BODY MASS INDEX: 21.17 KG/M2 | WEIGHT: 108.4 LBS | OXYGEN SATURATION: 94 % | DIASTOLIC BLOOD PRESSURE: 64 MMHG

## 2025-07-25 DIAGNOSIS — I10 PRIMARY HYPERTENSION: Primary | ICD-10-CM

## 2025-07-25 DIAGNOSIS — G89.29 CHRONIC BILATERAL LOW BACK PAIN WITH BILATERAL SCIATICA: ICD-10-CM

## 2025-07-25 DIAGNOSIS — M54.42 CHRONIC BILATERAL LOW BACK PAIN WITH BILATERAL SCIATICA: ICD-10-CM

## 2025-07-25 DIAGNOSIS — M48.062 NEUROGENIC CLAUDICATION DUE TO LUMBAR SPINAL STENOSIS: ICD-10-CM

## 2025-07-25 DIAGNOSIS — M96.1 POST LAMINECTOMY SYNDROME: ICD-10-CM

## 2025-07-25 DIAGNOSIS — E78.2 MIXED HYPERLIPIDEMIA: ICD-10-CM

## 2025-07-25 DIAGNOSIS — F17.200 TOBACCO USE DISORDER: ICD-10-CM

## 2025-07-25 DIAGNOSIS — F41.8 DEPRESSION WITH ANXIETY: ICD-10-CM

## 2025-07-25 DIAGNOSIS — M54.41 CHRONIC BILATERAL LOW BACK PAIN WITH BILATERAL SCIATICA: ICD-10-CM

## 2025-07-25 PROCEDURE — 99214 OFFICE O/P EST MOD 30 MIN: CPT | Performed by: INTERNAL MEDICINE

## 2025-07-25 PROCEDURE — 3074F SYST BP LT 130 MM HG: CPT | Performed by: INTERNAL MEDICINE

## 2025-07-25 PROCEDURE — 3078F DIAST BP <80 MM HG: CPT | Performed by: INTERNAL MEDICINE

## 2025-07-25 RX ORDER — AMITRIPTYLINE HYDROCHLORIDE 50 MG/1
50 TABLET, FILM COATED ORAL NIGHTLY
Qty: 30 TABLET | Refills: 11 | Status: SHIPPED | OUTPATIENT
Start: 2025-07-25 | End: 2026-07-25

## 2025-07-25 ASSESSMENT — ENCOUNTER SYMPTOMS
BACK PAIN: 1
SHORTNESS OF BREATH: 0
CARDIOVASCULAR NEGATIVE: 1
NERVOUS/ANXIOUS: 1
COUGH: 0
DYSURIA: 0
CONSTIPATION: 0
DIARRHEA: 0
DEPRESSION: 0
ACTIVITY CHANGE: 1
MYALGIAS: 1
LOSS OF SENSATION IN FEET: 0
OCCASIONAL FEELINGS OF UNSTEADINESS: 1
NECK PAIN: 1
FEVER: 0
CHILLS: 0
ARTHRALGIAS: 1
ABDOMINAL PAIN: 0

## 2025-07-25 ASSESSMENT — PAIN SCALES - GENERAL: PAINLEVEL_OUTOF10: 5

## 2025-07-25 ASSESSMENT — PATIENT HEALTH QUESTIONNAIRE - PHQ9
SUM OF ALL RESPONSES TO PHQ9 QUESTIONS 1 AND 2: 0
1. LITTLE INTEREST OR PLEASURE IN DOING THINGS: NOT AT ALL
2. FEELING DOWN, DEPRESSED OR HOPELESS: NOT AT ALL

## 2025-07-25 NOTE — PROGRESS NOTES
Subjective   Patient ID: Suly Vance is a 50 y.o. female who presents for 3 month follow up.    Hypertension-off meds and good . BP Readings from Last 3 Encounters:  07/25/25 : 114/64  06/03/25 : 112/78  05/13/25 : 110/60      Hyperlipidemia-on no meds.  Lab Results       Component                Value               Date                       LDLCALC                  111                 11/22/2022               Depression with anxiety-taking effexor but always with stress and anxiety    Chronic pain due to lumbar radiculopathy and post-laminectomy syndrome-no longer seeing pain management. Uses  medical marijuana.  After last surg--spasms better, left leg now with burning pain but right leg no better. Didn't do water therapy and hasn't returned to surgeon. Elavil helps but doesn't think the topomax does    Migraines-worse with stress-usually 1-2/week-imitrex helps    Dysphagia/GERD-fairly stable on protonix    Tobacco use--continues to smoke -worse with stress-about 1/2 ppd    Insomnia-using trazadone helps but wakes from pain    Vitamin D def on supplements           Review of Systems   Constitutional:  Positive for activity change. Negative for chills and fever.   Respiratory:  Negative for cough and shortness of breath.    Cardiovascular: Negative.    Gastrointestinal:  Negative for abdominal pain, constipation and diarrhea.        Bowels moving good   Genitourinary:  Negative for dysuria.   Musculoskeletal:  Positive for arthralgias, back pain, gait problem, myalgias and neck pain.   Skin:  Negative for rash.   Psychiatric/Behavioral:  The patient is nervous/anxious.        Objective   /64 (BP Location: Left arm, Patient Position: Sitting)   Pulse 96   Wt 49.2 kg (108 lb 6.4 oz)   SpO2 94%   BMI 21.17 kg/m²     Physical Exam  Constitutional:       General: She is not in acute distress.     Appearance: Normal appearance.      Comments: Using cane--has good days and bad days     Cardiovascular:       Rate and Rhythm: Normal rate and regular rhythm.      Heart sounds: Normal heart sounds. No murmur heard.     No gallop.   Pulmonary:      Effort: Pulmonary effort is normal.      Breath sounds: Normal breath sounds. No wheezing, rhonchi or rales.     Musculoskeletal:      Comments: better strength left leg; diminished on R     Skin:     General: Skin is warm and dry.      Findings: No rash.      Comments: [place]     Neurological:      Mental Status: She is alert and oriented to person, place, and time.      Motor: Weakness: left leg.      Deep Tendon Reflexes: Reflexes abnormal.     Psychiatric:      Comments: hopeful         Assessment/Plan  advise returning to therapy; wean off topomax and increase elavil  Diagnoses and all orders for this visit:  Primary hypertension  Mixed hyperlipidemia  Depression with anxiety  Chronic bilateral low back pain with bilateral sciatica  Neurogenic claudication due to lumbar spinal stenosis  -     amitriptyline (Elavil) 50 mg tablet; Take 1 tablet (50 mg) by mouth once daily at bedtime.  Post laminectomy syndrome  -     Referral to Physical Therapy; Future  Tobacco use disorder  Other orders  -     Follow Up In Primary Care - Established    Current Medications[1]           [1]   Current Outpatient Medications:     baclofen (Lioresal) 20 mg tablet, Take 1 tablet (20 mg) by mouth 2 times a day., Disp: 60 tablet, Rfl: 5    chlorzoxazone (Parafon Forte) 500 mg tablet, Take 1 tablet (500 mg) by mouth 4 times a day as needed for muscle spasms., Disp: 120 tablet, Rfl: 11    cholecalciferol (Vitamin D3) 50 MCG (2000 UT) tablet, Take 1 tablet (2,000 Units) by mouth once daily., Disp: 90 tablet, Rfl: 3    gabapentin (Neurontin) 600 mg tablet, TAKE 1 TABLET(600 MG) BY MOUTH THREE TIMES DAILY, Disp: 270 tablet, Rfl: 3    multivit-minerals/folic acid (ADULT MULTIVITAMIN GUMMIES ORAL), Take 1 tablet by mouth once daily., Disp: , Rfl:     naloxone (Narcan) 4 mg/0.1 mL nasal spray, Administer 1  spray (4 mg) into affected nostril(s) if needed for opioid reversal. May repeat every 2-3 minutes if needed, alternating nostrils, until medical assistance becomes available., Disp: 2 each, Rfl: 0    pantoprazole (ProtoNix) 40 mg EC tablet, TAKE 1 TABLET BY MOUTH EVERY DAY, Disp: 30 tablet, Rfl: 11    SUMAtriptan (Imitrex) 100 mg tablet, Take 1 tablet (100 mg) by mouth 1 time if needed for migraine. AT LEAST 2 HOURS BETWEEN DOSES AS NEEDED FOR HEADACHE, Disp: 10 tablet, Rfl: 11    topiramate (Topamax) 25 mg tablet, Take 1 tablet (25 mg) by mouth 2 times a day., Disp: 60 tablet, Rfl: 5    traZODone (Desyrel) 100 mg tablet, TAKE 2 TABLETS(200 MG) BY MOUTH EVERY DAY AT BEDTIME, Disp: 60 tablet, Rfl: 11    venlafaxine XR (Effexor-XR) 150 mg 24 hr capsule, Take 1 capsule (150 mg) by mouth once daily. Take with food., Disp: 30 capsule, Rfl: 11    amitriptyline (Elavil) 50 mg tablet, Take 1 tablet (50 mg) by mouth once daily at bedtime., Disp: 30 tablet, Rfl: 11

## 2025-07-25 NOTE — PATIENT INSTRUCTIONS
Decrease topomax to one at night for 2 weeks and if not worse stop completely    Increase amitryptiline to 50mg (may take 2 x 25mg you have til gone)

## 2025-07-28 DIAGNOSIS — M51.369 DEGENERATION OF LUMBAR INTERVERTEBRAL DISC: ICD-10-CM

## 2025-07-28 RX ORDER — CHLORZOXAZONE 500 MG/1
500 TABLET ORAL 4 TIMES DAILY PRN
Qty: 120 TABLET | Refills: 11 | Status: SHIPPED | OUTPATIENT
Start: 2025-07-28

## 2025-08-08 DIAGNOSIS — G43.C0 PERIODIC HEADACHE SYNDROME, NOT INTRACTABLE: ICD-10-CM

## 2025-08-08 RX ORDER — SUMATRIPTAN SUCCINATE 100 MG/1
TABLET ORAL
Qty: 10 TABLET | Refills: 11 | Status: SHIPPED | OUTPATIENT
Start: 2025-08-08

## 2025-08-19 DIAGNOSIS — Z12.31 ENCOUNTER FOR SCREENING MAMMOGRAM FOR BREAST CANCER: ICD-10-CM

## 2025-08-25 DIAGNOSIS — R13.10 DYSPHAGIA, UNSPECIFIED TYPE: ICD-10-CM

## 2025-08-25 RX ORDER — PANTOPRAZOLE SODIUM 40 MG/1
40 TABLET, DELAYED RELEASE ORAL DAILY
Qty: 30 TABLET | Refills: 11 | Status: SHIPPED | OUTPATIENT
Start: 2025-08-25

## (undated) DEVICE — GLOVES, SURGICAL, DERMASURE, SZ 6.5, DARK GREEN

## (undated) DEVICE — DRAPE, INSTRUMENT, W/POUCH, STERI DRAPE, 7 X 11 IN, DISPOSABLE, STERILE

## (undated) DEVICE — GLOVE, SURGICAL, PROTEXIS PI , 7.5, PF, LF

## (undated) DEVICE — SUTURE, VICRYL, 2-0, 27 IN, FS-1, UNDYED

## (undated) DEVICE — CLOSURE SYSTEM, DERMABOND, PRINEO, 22CM, STERILE

## (undated) DEVICE — KIT, XLIF NVM5 DISP

## (undated) DEVICE — ACCESS KIT, MAXCESS 4 SURGICAL

## (undated) DEVICE — MARKER, SKIN, DUAL TIP INK W/9 LABEL AND REMOVABLE TIME OUT SLEEVE

## (undated) DEVICE — GLOVE, SURGICAL, PROTEXIS PI , 6.5, PF, LF

## (undated) DEVICE — APPLICATOR, CHLORAPREP, W/ORANGE TINT, 26ML

## (undated) DEVICE — SLEEVE, VASO PRESS, CALF GARMENT, MEDIUM, GREEN

## (undated) DEVICE — DRESSING, TRANSPARENT, TEGADERM, 8 X 12 IN

## (undated) DEVICE — SUTURE, STRATAFIX PDS PLUS, 1, OS-6, SYMMETRIC 45CM, VIOLET

## (undated) DEVICE — GLOVE, SURGICAL, PROTEXIS PI , 8.0, PF, LF

## (undated) DEVICE — SPONGE, LAP, XRAY DECT, 18IN X 18IN, W/MASTER DMT, STERILE

## (undated) DEVICE — SOLUTION, IRRIGATION, X RX SODIUM CHL 0.9%, 1000ML BTL

## (undated) DEVICE — COVER, C-ARM W/CLIPS, OEC GE

## (undated) DEVICE — SUTURE, STRATAFIX, 3-0 MONOCRYL PLUS, PS-2 SPIRAL UNDYED

## (undated) DEVICE — Device

## (undated) DEVICE — SPONGE, LAP, XRAY DECT, 4IN X 18IN, W/MASTER DMT, STERILE

## (undated) DEVICE — MODULE, NEEDLE EMG M5

## (undated) DEVICE — DRAPE PACK, UNIVERSAL II

## (undated) DEVICE — HEMOSTATIC, MATRIX, SURGIFLO, 8ML

## (undated) DEVICE — ELECTRODE, ELECTROSURGICAL, BLADE, EXTENDED

## (undated) DEVICE — DRAPE, SHEET, UTILITY, NON ABSORBENT, 18 X 26 IN, LF